# Patient Record
Sex: FEMALE | Race: WHITE | Employment: UNEMPLOYED | URBAN - METROPOLITAN AREA
[De-identification: names, ages, dates, MRNs, and addresses within clinical notes are randomized per-mention and may not be internally consistent; named-entity substitution may affect disease eponyms.]

---

## 2017-10-17 ENCOUNTER — OFFICE VISIT (OUTPATIENT)
Dept: FAMILY MEDICINE CLINIC | Age: 32
End: 2017-10-17
Payer: COMMERCIAL

## 2017-10-17 DIAGNOSIS — Z83.2 FAMILY HISTORY OF FACTOR V DEFICIENCY: ICD-10-CM

## 2017-10-17 DIAGNOSIS — F41.9 ANXIETY: ICD-10-CM

## 2017-10-17 DIAGNOSIS — M51.36 DDD (DEGENERATIVE DISC DISEASE), LUMBAR: ICD-10-CM

## 2017-10-17 DIAGNOSIS — R63.5 WEIGHT GAIN: ICD-10-CM

## 2017-10-17 DIAGNOSIS — Z00.00 PREVENTATIVE HEALTH CARE: ICD-10-CM

## 2017-10-17 DIAGNOSIS — Z01.419 ENCOUNTER FOR CERVICAL PAP SMEAR WITH PELVIC EXAM: ICD-10-CM

## 2017-10-17 DIAGNOSIS — M79.7 FIBROMYALGIA: ICD-10-CM

## 2017-10-17 DIAGNOSIS — N95.1 HOT FLUSHES, PERIMENOPAUSAL: Primary | ICD-10-CM

## 2017-10-17 PROCEDURE — 99204 OFFICE O/P NEW MOD 45 MIN: CPT | Performed by: NURSE PRACTITIONER

## 2017-10-17 RX ORDER — GABAPENTIN 100 MG/1
100 CAPSULE ORAL 2 TIMES DAILY
Qty: 60 CAPSULE | Refills: 0 | Status: SHIPPED | OUTPATIENT
Start: 2017-10-17 | End: 2017-10-31

## 2017-10-17 ASSESSMENT — ENCOUNTER SYMPTOMS
BACK PAIN: 1
WHEEZING: 0
BLOOD IN STOOL: 0
RHINORRHEA: 0
ABDOMINAL PAIN: 0
SINUS PRESSURE: 0
EYE DISCHARGE: 0
COUGH: 0
SHORTNESS OF BREATH: 0

## 2017-10-17 NOTE — PROGRESS NOTES
[id number] [place] [id number]  Dept: [phone]  Dept Fax: [phone]    Sherice Shirley is a 27 y.o. female who presents today for her medical conditions/complaints as noted below. Sherice Shirley is c/o of Establish Care ( body feels hot not sweating) and Weight Loss (gained weight)        HPI:     Patient presents with:  Establish Care:  body feels hot  While sleeping, not sweating x 8 mos   Weight Loss: gained weight, over 1.5 y; 40-50 lbs. Stressful yr - brother   Brother +  factor V , would like tested   DDD dx at young age. Has been in PM in past/injections   Fibromyal; has been on lyrica in past. Caused tiredness. Took neurontin   Anxiety; took ativan .quit f/u   'had bad experience with treatments' just quit going. Wants fresh experience. Sees ob/gyn in [facility]. Past Medical History:   Diagnosis Date    Anxiety     Chronic back pain     DDD (degenerative disc disease), lumbar     L4-5 ; history of injections     Depression     Fibromyalgia     Headache       History reviewed. No pertinent surgical history. Family History   Problem Relation Age of Onset    High Blood Pressure Mother     Other Mother     Breast Cancer Maternal Grandmother     Depression Paternal Grandmother     Stroke Brother      age 29    Other Brother      Leiden factor V        Social History   Substance Use Topics    Smoking status: Former Smoker     Packs/day: 0.50     Years: 15.00    Smokeless tobacco: Never Used      Comment: quit 2015    Alcohol use No      Current Outpatient Prescriptions   Medication Sig Dispense Refill    gabapentin (NEURONTIN) 100 MG capsule Take 1 capsule by mouth 2 times daily 60 capsule 0     No current facility-administered medications for this visit. No Known Allergies      Subjective:      Review of Systems   Constitutional: Negative for activity change, chills, diaphoresis, fatigue and fever.    HENT: to person, place, and time. Skin: Skin is warm and dry. Psychiatric: She has a normal mood and affect. Nursing note and vitals reviewed. /82   Pulse 83   Temp 98.1 °F (36.7 °C) (Oral)   Ht 4' 11\" (1.499 m)   Wt 255 lb 14.4 oz (116.1 kg)   LMP 09/24/2017 (Approximate)   SpO2 99%   Breastfeeding? No   BMI 51.69 kg/m²     CBC: No results found for: WBC, RBC, HGB, HCT, MCV, MCH, MCHC, RDW, PLT, MPV  CMP:  No results found for: NA, K, CL, CO2, BUN, CREATININE, GFRAA, AGRATIO, LABGLOM, GLUCOSE, PROT, LABALBU, CALCIUM, BILITOT, ALKPHOS, AST, ALT  No results found for: LABA1C        Assessment:      1. Hot flushes, perimenopausal     2. Fibromyalgia  gabapentin (NEURONTIN) 100 MG capsule    Valerie Strickland MD, [facility] [place]*   3. Anxiety  Amb External Referral To Psychology   4. Weight gain     5. DDD (degenerative disc disease), lumbar  Gladys Gonzalez MD, [facility] [place]*   6. Preventative health care  Comprehensive Metabolic Panel    CBC    Lipid Panel    TSH with Reflex    Vitamin D 25 Hydroxy   7. Encounter for cervical Pap smear with pelvic exam  [id number] [facility], [address] [place] [address]   8. Family history of factor V deficiency  Prothrombin 20210 Mutation Invader    Factor V Leiden       Plan: Will monitor. Discussed ssri use and decrasesing heat wheils sleeping, incl not limited to fan,clothing, bedsheets, etc  2. Will start gabap. Monitor for improvement. 3 ref placed - no meds at his time  4 Labs ordered   5 ref placed. no meds at his time  [place] received counseling on the following healthy behaviors: medication adherence  Reviewed prior labs and health maintenance  Continue current medications, diet and exercise. Discussed use, benefit, and side effects of prescribed medications. Barriers to medication compliance addressed.    Patient given educational materials - see patient instructions  Was a self-tracking handout given in paper form or via MyChart? Yes    Requested Prescriptions     Signed Prescriptions Disp Refills    gabapentin (NEURONTIN) 100 MG capsule 60 capsule 0     Sig: Take 1 capsule by mouth 2 times daily       All patient questions answered. Patient voiced understanding. Quality Measures    Body mass index is 51.69 kg/m². Elevated. Weight control planned discussed Healthy diet and regular exercise. BP: 124/82 Blood pressure is normal. Treatment plan consists of No treatment change needed. No results found for: LDLCALC, LDLCHOLESTEROL, LDLDIRECT (goal LDL reduction with dx if diabetes is 50% LDL reduction)      No flowsheet data found. Interpretation of Total Score Depression Severity: 1-4 = Minimal depression, 5-9 = Mild depression, 10-14 = Moderate depression, 15-19 = Moderately severe depression, 20-27 = Severe depression    Return in about 3 weeks (around 11/7/2017).     Orders Placed This Encounter   Procedures    Prothrombin 20210 Mutation Invader     Standing Status:   Future     Standing Expiration Date:   10/17/2018    Factor V Leiden     Standing Status:   Future     Standing Expiration Date:   10/17/2018    Comprehensive Metabolic Panel     Standing Status:   Future     Standing Expiration Date:   10/17/2018    CBC     Standing Status:   Future     Standing Expiration Date:   10/17/2018    Lipid Panel     Standing Status:   Future     Standing Expiration Date:   10/17/2018     Order Specific Question:   Is Patient Fasting?/# of Hours     Answer:   12    TSH with Reflex     Standing Status:   Future     Standing Expiration Date:   10/18/2018    Vitamin D 25 Hydroxy     Standing Status:   Future     Standing Expiration Date:   10/17/2018   [address], [address], [address]*     Referral Priority:   Routine     Referral Type:   Consult for Advice and Opinion     Referral Reason:   Specialty Services Required     Requested Specialty:   Obstetrics & Gynecology     Number of Visits Requested:   1    Amb External Referral To Psychology     Referral Priority:   Routine     Referral Reason:   Specialty Services Required     Referred to Provider:   Hermann Regalado     Requested Specialty:   Psychology     Number of Visits Requested:   [facility] Sandra Iverson MD, [facility] [place]*     Referral Priority:   Routine     Referral Type:   Consult for Advice and Opinion     Referral Reason:   Specialty Services Required     Referred to Provider:   Maria Del Rosario Suarez MD     Requested Specialty:   Pain Management     Number of Visits Requested:   1     Orders Placed This Encounter   Medications    gabapentin (NEURONTIN) 100 MG capsule     Sig: Take 1 capsule by mouth 2 times daily     Dispense:  60 capsule     Refill:  0       Patient given verbal and/or written educational instructions. Follow up as directed. I have reviewed and agree with the nursing documentation.     Electronically signed by Pablo Steele NP on 10/17/2017 at 11:49 AM

## 2017-10-18 ENCOUNTER — TELEPHONE (OUTPATIENT)
Dept: FAMILY MEDICINE CLINIC | Age: 32
End: 2017-10-18

## 2017-10-18 VITALS
HEART RATE: 83 BPM | DIASTOLIC BLOOD PRESSURE: 82 MMHG | TEMPERATURE: 98.1 F | BODY MASS INDEX: 41.33 KG/M2 | OXYGEN SATURATION: 99 % | SYSTOLIC BLOOD PRESSURE: 124 MMHG | HEIGHT: 59 IN | WEIGHT: 205 LBS

## 2017-10-18 DIAGNOSIS — Z72.51 UNPROTECTED SEX: Primary | ICD-10-CM

## 2017-10-18 NOTE — TELEPHONE ENCOUNTER
Pt is requesting pregnancy test to be added to her blood work order that you ordered. Can you do this. Please advise.  Thank you

## 2017-10-26 ENCOUNTER — HOSPITAL ENCOUNTER (OUTPATIENT)
Age: 32
Setting detail: SPECIMEN
Discharge: HOME | End: 2017-10-26
Payer: COMMERCIAL

## 2017-10-26 DIAGNOSIS — Z00.00 PREVENTATIVE HEALTH CARE: ICD-10-CM

## 2017-10-26 DIAGNOSIS — Z83.2 FAMILY HISTORY OF FACTOR V DEFICIENCY: ICD-10-CM

## 2017-10-26 DIAGNOSIS — Z72.51 UNPROTECTED SEX: ICD-10-CM

## 2017-10-26 LAB
ALBUMIN SERPL-MCNC: 4 G/DL (ref 3.5–5.2)
ALBUMIN/GLOBULIN RATIO: 1.2 (ref 1–2.5)
ALP BLD-CCNC: 92 U/L (ref 35–104)
ALT SERPL-CCNC: 22 U/L (ref 5–33)
ANION GAP SERPL CALCULATED.3IONS-SCNC: 17 MMOL/L (ref 9–17)
AST SERPL-CCNC: 21 U/L
BILIRUB SERPL-MCNC: 0.26 MG/DL (ref 0.3–1.2)
BUN BLDV-MCNC: 9 MG/DL (ref 6–20)
BUN/CREAT BLD: ABNORMAL (ref 9–20)
CALCIUM SERPL-MCNC: 9.3 MG/DL (ref 8.6–10.4)
CHLORIDE BLD-SCNC: 101 MMOL/L (ref 98–107)
CHOLESTEROL/HDL RATIO: 2
CHOLESTEROL: 108 MG/DL
CO2: 20 MMOL/L (ref 20–31)
CREAT SERPL-MCNC: 0.56 MG/DL (ref 0.5–0.9)
GFR AFRICAN AMERICAN: >60 ML/MIN
GFR NON-AFRICAN AMERICAN: >60 ML/MIN
GFR SERPL CREATININE-BSD FRML MDRD: ABNORMAL ML/MIN/{1.73_M2}
GFR SERPL CREATININE-BSD FRML MDRD: ABNORMAL ML/MIN/{1.73_M2}
GLUCOSE BLD-MCNC: 84 MG/DL (ref 70–99)
HCG QUANTITATIVE: 1764 IU/L
HCT VFR BLD CALC: 41.9 % (ref 36–46)
HDLC SERPL-MCNC: 54 MG/DL
HEMOGLOBIN: 14.1 G/DL (ref 12–16)
LDL CHOLESTEROL: 46 MG/DL (ref 0–130)
MCH RBC QN AUTO: 29.4 PG (ref 26–34)
MCHC RBC AUTO-ENTMCNC: 33.7 G/DL (ref 31–37)
MCV RBC AUTO: 87.2 FL (ref 80–100)
PDW BLD-RTO: 14.6 % (ref 12.5–15.4)
PLATELET # BLD: 387 K/UL (ref 140–450)
PMV BLD AUTO: 8.1 FL (ref 6–12)
POTASSIUM SERPL-SCNC: 3.7 MMOL/L (ref 3.7–5.3)
RBC # BLD: 4.8 M/UL (ref 4–5.2)
SODIUM BLD-SCNC: 138 MMOL/L (ref 135–144)
TOTAL PROTEIN: 7.4 G/DL (ref 6.4–8.3)
TRIGL SERPL-MCNC: 42 MG/DL
TSH SERPL DL<=0.05 MIU/L-ACNC: 2.62 MIU/L (ref 0.3–5)
VITAMIN D 25-HYDROXY: 13.8 NG/ML (ref 30–100)
VLDLC SERPL CALC-MCNC: NORMAL MG/DL (ref 1–30)
WBC # BLD: 12.9 K/UL (ref 3.5–11)

## 2017-10-31 ENCOUNTER — OFFICE VISIT (OUTPATIENT)
Dept: FAMILY MEDICINE CLINIC | Age: 32
End: 2017-10-31
Payer: COMMERCIAL

## 2017-10-31 VITALS
TEMPERATURE: 98 F | HEIGHT: 59 IN | WEIGHT: 209.9 LBS | OXYGEN SATURATION: 98 % | HEART RATE: 89 BPM | SYSTOLIC BLOOD PRESSURE: 122 MMHG | DIASTOLIC BLOOD PRESSURE: 80 MMHG | BODY MASS INDEX: 42.32 KG/M2

## 2017-10-31 DIAGNOSIS — M51.36 DDD (DEGENERATIVE DISC DISEASE), LUMBAR: Primary | ICD-10-CM

## 2017-10-31 DIAGNOSIS — M79.7 FIBROMYALGIA: ICD-10-CM

## 2017-10-31 DIAGNOSIS — F41.9 ANXIETY: ICD-10-CM

## 2017-10-31 DIAGNOSIS — E55.9 VITAMIN D DEFICIENCY: ICD-10-CM

## 2017-10-31 PROCEDURE — 99214 OFFICE O/P EST MOD 30 MIN: CPT | Performed by: NURSE PRACTITIONER

## 2017-10-31 ASSESSMENT — PATIENT HEALTH QUESTIONNAIRE - PHQ9
1. LITTLE INTEREST OR PLEASURE IN DOING THINGS: 0
SUM OF ALL RESPONSES TO PHQ9 QUESTIONS 1 & 2: 0
SUM OF ALL RESPONSES TO PHQ QUESTIONS 1-9: 0
2. FEELING DOWN, DEPRESSED OR HOPELESS: 0

## 2017-10-31 ASSESSMENT — ENCOUNTER SYMPTOMS
WHEEZING: 0
BLOOD IN STOOL: 0
ABDOMINAL PAIN: 0
BACK PAIN: 1
SHORTNESS OF BREATH: 0
EYE DISCHARGE: 0
NAUSEA: 0
COUGH: 0
RHINORRHEA: 0
SINUS PRESSURE: 0

## 2017-10-31 NOTE — PROGRESS NOTES
cold intolerance and heat intolerance. Genitourinary: Negative for dysuria, flank pain and hematuria. Musculoskeletal: Positive for arthralgias and back pain. Negative for joint swelling and myalgias. Skin: Negative for pallor and rash. Neurological: Negative for headaches. Psychiatric/Behavioral: Negative for hallucinations and suicidal ideas. The patient is nervous/anxious (most concerns are now  re: new pregnancy ). Objective:     Physical Exam   Constitutional: She is oriented to person, place, and time. She appears well-developed and well-nourished. HENT:   Head: Normocephalic and atraumatic. Eyes: EOM are normal.   Neck: Carotid bruit is not present. Cardiovascular: Normal rate, regular rhythm and normal heart sounds. Pulmonary/Chest: Effort normal and breath sounds normal.   Abdominal: Soft. Bowel sounds are normal.   Neurological: She is alert and oriented to person, place, and time. Skin: Skin is warm and dry. Psychiatric: She has a normal mood and affect. Nursing note and vitals reviewed.     /80   Pulse 89   Temp 98 °F (36.7 °C) (Oral)   Ht 4' 11\" (1.499 m)   Wt 209 lb 14.4 oz (95.2 kg)   LMP 09/24/2017 (Approximate)   SpO2 98%   BMI 42.39 kg/m²     CBC:   Lab Results   Component Value Date    WBC 12.9 10/26/2017    RBC 4.80 10/26/2017    HGB 14.1 10/26/2017    HCT 41.9 10/26/2017    MCV 87.2 10/26/2017    MCH 29.4 10/26/2017    MCHC 33.7 10/26/2017    RDW 14.6 10/26/2017     10/26/2017    MPV 8.1 10/26/2017     CMP:    Lab Results   Component Value Date     10/26/2017    K 3.7 10/26/2017     10/26/2017    CO2 20 10/26/2017    BUN 9 10/26/2017    CREATININE 0.56 10/26/2017    GFRAA >60 10/26/2017    LABGLOM >60 10/26/2017    GLUCOSE 84 10/26/2017    PROT 7.4 10/26/2017    LABALBU 4.0 10/26/2017    CALCIUM 9.3 10/26/2017    BILITOT 0.26 10/26/2017    ALKPHOS 92 10/26/2017    AST 21 10/26/2017    ALT 22 10/26/2017     No results found for: LABA1C        Assessment:      1. DDD (degenerative disc disease), lumbar     2. Fibromyalgia     3. Vitamin D deficiency     4. Anxiety         Plan:     1. DDD (degenerative disc disease), lumbar     2. Fibromyalgia   pt advised to stop gabapentin. Will discuss with future ob provider. First appt 17. 3. Vitamin D deficiency Pt will be started on pre jesus vit, will wait on vit d supplementation. 4. Anxiety  Has ref to penny mendoza soon. hector re: moving, brother, now pregnance                   [employer] received counseling on the following healthy behaviors: medication adherence  Reviewed prior labs and health maintenance  Continue current medications, diet and exercise. Discussed use, benefit, and side effects of prescribed medications. Barriers to medication compliance addressed. Patient given educational materials - see patient instructions  Was a self-tracking handout given in paper form or via [email or web address]? Yes    Requested Prescriptions      No prescriptions requested or ordered in this encounter       All patient questions answered. Patient voiced understanding. Quality Measures    Body mass index is 42.39 kg/m². Elevated. Weight control planned discussed Healthy diet and regular exercise. BP: 122/80 Blood pressure is normal. Treatment plan consists of No treatment change needed. Lab Results   Component Value Date    LDLCHOLESTEROL 46 10/26/2017    (goal LDL reduction with dx if diabetes is 50% LDL reduction)      PHQ Scores 10/31/2017   PHQ2 Score 0   PHQ9 Score 0     Interpretation of Total Score Depression Severity: 1-4 = Minimal depression, 5-9 = Mild depression, 10-14 = Moderate depression, 15-19 = Moderately severe depression, 20-27 = Severe depression    Return in about 3 months (around 2018). No orders of the defined types were placed in this encounter. No orders of the defined types were placed in this encounter.       Patient given verbal and/or written

## 2017-11-01 LAB
FACTOR V LEIDEN MUTATION: NORMAL
PROTHROMBIN G20210A MUTATION: NORMAL

## 2017-11-08 ENCOUNTER — TELEPHONE (OUTPATIENT)
Dept: OBGYN CLINIC | Age: 32
End: 2017-11-08

## 2017-11-08 ENCOUNTER — TELEPHONE (OUTPATIENT)
Dept: FAMILY MEDICINE CLINIC | Age: 32
End: 2017-11-08

## 2017-11-08 NOTE — TELEPHONE ENCOUNTER
Pt stated that she is having but patient has not seen her gyn yet.  She doesn't see her until next week that is why she is calling

## 2017-11-09 NOTE — TELEPHONE ENCOUNTER
I cannot recommend anti diarrheal b/c it will constipate her more, cannot rx motility drug could instigate diarrhea. She should make appt to discuss the time frame of these complaints. She needs excellent h20 intake at this time. Please update patient.

## 2017-11-14 ENCOUNTER — HOSPITAL ENCOUNTER (OUTPATIENT)
Age: 32
Setting detail: SPECIMEN
Discharge: HOME | End: 2017-11-14
Payer: COMMERCIAL

## 2017-11-14 ENCOUNTER — OFFICE VISIT (OUTPATIENT)
Dept: OBGYN CLINIC | Age: 32
End: 2017-11-14
Payer: COMMERCIAL

## 2017-11-14 VITALS
HEIGHT: 59 IN | SYSTOLIC BLOOD PRESSURE: 92 MMHG | WEIGHT: 206.4 LBS | BODY MASS INDEX: 41.61 KG/M2 | DIASTOLIC BLOOD PRESSURE: 70 MMHG

## 2017-11-14 DIAGNOSIS — Z32.01 POSITIVE PREGNANCY TEST: ICD-10-CM

## 2017-11-14 DIAGNOSIS — K59.00 CONSTIPATION, UNSPECIFIED CONSTIPATION TYPE: ICD-10-CM

## 2017-11-14 DIAGNOSIS — Z32.01 POSITIVE PREGNANCY TEST: Primary | ICD-10-CM

## 2017-11-14 LAB
CHLAMYDIA CULTURE: NEGATIVE
CULTURE, GONORRHOEAE: NEGATIVE

## 2017-11-14 PROCEDURE — 99213 OFFICE O/P EST LOW 20 MIN: CPT | Performed by: NURSE PRACTITIONER

## 2017-11-14 NOTE — PATIENT INSTRUCTIONS
Patient Education   Patient Education        Learning About When to Call Your Doctor During Pregnancy (Up to 20 Weeks)  Your Care Instructions  It's common to have concerns about what might be a problem during pregnancy. Although most pregnant women don't have any serious problems, it's important to know when to call your doctor if you have certain symptoms. These are general suggestions. Your doctor may give you some more information about when to call. When to call your doctor (up to 20 weeks)  Call 911 anytime you think you may need emergency care. For example, call if:  · You passed out (lost consciousness). Call your doctor now or seek immediate medical care if:  · You have a fever. · You have vaginal bleeding. · You are dizzy or lightheaded, or you feel like you may faint. · You have symptoms of a urinary tract infection. These may include:  ¨ Pain or burning when you urinate. ¨ A frequent need to urinate without being able to pass much urine. ¨ Pain in the flank, which is just below the rib cage and above the waist on either side of the back. ¨ Blood in your urine. · You have belly pain. · You think you are having contractions. · You have a sudden release of fluid from your vagina. Watch closely for changes in your health, and be sure to contact your doctor if:  · You have vaginal discharge that smells bad. · You have other concerns about your pregnancy. Follow-up care is a key part of your treatment and safety. Be sure to make and go to all appointments, and call your doctor if you are having problems. It's also a good idea to know your test results and keep a list of the medicines you take. Where can you learn more? Go to https://arline.Showbucks. org and sign in to your LoveSurf account. Enter L542 in the Juxinli box to learn more about \"Learning About When to Call Your Doctor During Pregnancy (Up to 20 Weeks). \"     If you do not have an account, please click on the \"Sign Up Now\" link. Current as of: March 16, 2017  Content Version: 11.3  © 2134-9580 [employer]. Care instructions adapted under license by [facility] ([facility]). If you have questions about a medical condition or this instruction, always ask your healthcare professional. [address] any warranty or liability for your use of this information. Weeks 6 to 10 of Your Pregnancy: Care Instructions  Your Care Instructions    Congratulations on your pregnancy. This is an exciting and important time for you. During the first 6 to 10 weeks of your pregnancy, your body goes through many changes. Your baby grows very fast, even though you cannot feel it yet. You may start to notice that you feel different, both in your body and your emotions. Because each woman's pregnancy is unique, there is no right way to feel. You may feel the healthiest you have ever been, or you may feel tired or sick to your stomach (\"morning sickness\"). These early weeks are a time to make healthy choices and to eat the best foods for you and your baby. This care sheet will give you some ideas. This is also a good time to think about birth defects testing. These are tests done during pregnancy to look for possible problems with the baby. First trimester tests for birth defects can be done between 8 and 17 weeks of pregnancy, depending on the test. Talk with your doctor about what kinds of tests are available. Follow-up care is a key part of your treatment and safety. Be sure to make and go to all appointments, and call your doctor if you are having problems. It's also a good idea to know your test results and keep a list of the medicines you take. How can you care for yourself at home? Eat well  · Eat at least 3 meals and 2 healthy snacks every day.  Eat fresh, whole foods, including:  ¨ 7 or more servings of bread, tortillas, cereal, rice, pasta, or oatmeal.  ¨ 3 or more servings of vegetables, especially leafy green vegetables. ¨ 2 or more servings of fruits. ¨ 3 or more servings of milk, yogurt, or cheese. ¨ 2 or more servings of meat, turkey, chicken, fish, eggs, or dried beans. · Drink plenty of fluids, especially water. Avoid sodas and other sweetened drinks. · Choose foods that have important vitamins for your baby, such as calcium, iron, and folate. ¨ Dairy products, tofu, canned fish with bones, almonds, broccoli, dark leafy greens, corn tortillas, and fortified orange juice are good sources of calcium. ¨ Beef, poultry, liver, spinach, lentils, dried beans, fortified cereals, and dried fruits are rich in iron. ¨ Dark leafy greens, broccoli, asparagus, liver, fortified cereals, orange juice, peanuts, and almonds are good sources of folate. · Avoid foods that could harm your baby. ¨ Do not eat raw or undercooked meat, chicken, or fish (such as sushi or raw oysters). ¨ Do not eat raw eggs or foods that contain raw eggs, such as Caesar dressing. ¨ Do not eat soft cheeses and unpasteurized dairy foods, such as Brie, feta, or blue cheese. ¨ Do not eat fish that contains a lot of mercury, such as shark, swordfish, tilefish, or gregory mackerel. Do not eat more than 6 ounces of tuna each week. ¨ Do not eat raw sprouts, especially alfalfa sprouts. ¨ Cut down on caffeine, such as coffee, tea, and cola. Protect yourself and your baby  · Do not touch chau litter or cat feces. They can cause an infection that could harm your baby. · High body temperature can be harmful to your baby. So if you want to use a sauna or hot tub, be sure to talk to your doctor about how to use it safely. [place] with morning sickness  · Sip small amounts of water, juices, or shakes. Try drinking between meals, not with meals. · Eat 5 or 6 small meals a day. Try dry toast or crackers when you first get up, and eat breakfast a little later. · Avoid spicy, greasy, and fatty foods.   · When you feel sick, open your windows or go for a short walk to get fresh air. · Try nausea wristbands. These help some women. · Tell your doctor if you think your prenatal vitamins make you sick. Where can you learn more? Go to https://chmichelle.healthScrewpulp. org and sign in to your ValveXchange account. Enter G112 in the [facility] box to learn more about \"Weeks 6 to 10 of Your Pregnancy: Care Instructions. \"     If you do not have an account, please click on the \"Sign Up Now\" link. Current as of: March 16, 2017  Content Version: 11.3  © 6488-5167 [email or web address]. Care instructions adapted under license by [facility] ([facility]). If you have questions about a medical condition or this instruction, always ask your healthcare professional. [address] any warranty or liability for your use of this information. Patient Education        High-Fiber Diet: Care Instructions  Your Care Instructions  A high-fiber diet may help you relieve constipation and feel less bloated. Your doctor and dietitian will help you make a high-fiber eating plan based on your personal needs. The plan will include the things you like to eat. It will also make sure that you get 30 grams of fiber a day. Before you make changes to the way you eat, be sure to talk with your doctor or dietitian. Follow-up care is a key part of your treatment and safety. Be sure to make and go to all appointments, and call your doctor if you are having problems. It's also a good idea to know your test results and keep a list of the medicines you take. How can you care for yourself at home? · You can increase how much fiber you get if you eat more of certain foods. These foods include:  ¨ Whole-grain breads and cereals. ¨ Fruits, such as pears, apples, and peaches. Eat the skins and peels if you can. ¨ Vegetables, such as broccoli, cabbage, spinach, carrots, asparagus, and squash. ¨ Starchy vegetables.  These include potatoes with skins, kidney beans, and lima beans. · Take a fiber supplement every day if your doctor recommends it. Examples are Benefiber, Citrucel, FiberCon, and Metamucil. Ask your doctor how much to take. · Drink plenty of fluids, enough so that your urine is light yellow or clear like water. If you have kidney, heart, or liver disease and have to limit fluids, talk with your doctor before you increase the amount of fluids you drink. · Get some exercise every day. Exercise helps stool move through the colon. It also helps prevent constipation. · Keep a food diary. Try to notice and write down what foods cause gas, pain, or other symptoms. Then you can avoid these foods. Where can you learn more? Go to https://NvestpeBlottreb.Impact Radius. org and sign in to your delicious account. Enter Q271 in the [employer] box to learn more about \"High-Fiber Diet: Care Instructions. \"     If you do not have an account, please click on the \"Sign Up Now\" link. Current as of: December 15, 2016  Content Version: 11.3  © 8572-3639 [employer]. Care instructions adapted under license by [facility] ([facility]). If you have questions about a medical condition or this instruction, always ask your healthcare professional. [address] any warranty or liability for your use of this information.

## 2017-11-14 NOTE — PROGRESS NOTES
Valeria Baker is a 27 y.o.  at 7w6d with Estimated Date of Delivery: 18 who presents for prenatal care. This is a planned pregnancy : Yes  Patient's last menstrual period was 2017. Certain LMP : yes      Pregnancy symptoms include fatigue, breast tenderness, nausea, \"morning sickness\", positive home pregnancy test and frequent urination  nausea with vomiting for 10 days  Self employed  Pain Score   0/10  Partner's name- Napolean  Relationship with FOB: spouse, living together. Patient does intend to breast feed. Pregnancy history fully reviewed. Mother's ethnicity:   Father's ethnicity:     Has been having constipation issues for about 2 weeks. POB:   OB History    Para Term  AB Living   2 1 1     1   SAB TAB Ectopic Molar Multiple Live Births             1      # Outcome Date GA Lbr Gallito/2nd Weight Sex Delivery Anes PTL Lv   2 Current            1 Term  40w0d   F Vag-Spont   MORRO        Complications from previous pregnancies/deliveries  Denies complications    PGYN: denies STDs; denies abnormal pap smears                      Menses regular no  Contraception none    PMH:  has a past medical history of Anxiety; Chronic back pain; DDD (degenerative disc disease), lumbar; Depression; Fibromyalgia; and Headache. PSH:  has no past surgical history on file. FH:family history includes Breast Cancer in her maternal grandmother; Depression in her paternal grandmother; High Blood Pressure in her mother; Other in her brother and mother; Stroke in her brother. SH: denies X 3, family supportive  reports that she has quit smoking. She has a 7.50 pack-year smoking history. She has never used smokeless tobacco. She reports that she does not drink alcohol or use drugs.   ROS      Physical exam:BP 92/70 (Site: Right Arm, Position: Sitting, Cuff Size: Large Adult)   Ht 4' 11\" (1.499 m)   Wt 206 lb 6.4 oz (93.6 kg)   LMP 2017 defined types were placed in this encounter.

## 2017-11-15 LAB
C TRACH DNA GENITAL QL NAA+PROBE: NEGATIVE
N. GONORRHOEAE DNA: NEGATIVE

## 2017-11-27 LAB
CYTOLOGY REPORT: NORMAL
HPV SAMPLE: NORMAL
HPV SOURCE: NORMAL
HPV, GENOTYPE 16: NOT DETECTED
HPV, GENOTYPE 18: NOT DETECTED
HPV, HIGH RISK OTHER: NOT DETECTED
HPV, INTERPRETATION: NORMAL

## 2017-12-01 ENCOUNTER — INITIAL PRENATAL (OUTPATIENT)
Dept: OBGYN CLINIC | Age: 32
End: 2017-12-01

## 2017-12-01 ENCOUNTER — PROCEDURE VISIT (OUTPATIENT)
Dept: OBGYN CLINIC | Age: 32
End: 2017-12-01

## 2017-12-01 ENCOUNTER — HOSPITAL ENCOUNTER (OUTPATIENT)
Age: 32
Setting detail: SPECIMEN
Discharge: HOME | End: 2017-12-01
Payer: COMMERCIAL

## 2017-12-01 VITALS
DIASTOLIC BLOOD PRESSURE: 74 MMHG | BODY MASS INDEX: 42.37 KG/M2 | SYSTOLIC BLOOD PRESSURE: 98 MMHG | HEART RATE: 82 BPM | WEIGHT: 209.8 LBS

## 2017-12-01 DIAGNOSIS — Z3A.10 10 WEEKS GESTATION OF PREGNANCY: Primary | ICD-10-CM

## 2017-12-01 DIAGNOSIS — Z32.01 POSITIVE PREGNANCY TEST: ICD-10-CM

## 2017-12-01 LAB
AMPHETAMINE SCREEN URINE: NEGATIVE
BARBITURATE SCREEN URINE: NEGATIVE
BENZODIAZEPINE SCREEN, URINE: NEGATIVE
BUPRENORPHINE URINE: NORMAL
CANNABINOID SCREEN URINE: NEGATIVE
COCAINE METABOLITE, URINE: NEGATIVE
CRL: NORMAL CM
MDMA URINE: NORMAL
METHADONE SCREEN, URINE: NEGATIVE
METHAMPHETAMINE, URINE: NORMAL
OPIATES, URINE: NEGATIVE
OXYCODONE SCREEN URINE: NEGATIVE
PHENCYCLIDINE, URINE: NEGATIVE
PROPOXYPHENE, URINE: NORMAL
SAC DIAMETER: NORMAL CM
TEST INFORMATION: NORMAL
TRICYCLIC ANTIDEPRESSANTS, UR: NORMAL

## 2017-12-01 PROCEDURE — 0500F INITIAL PRENATAL CARE VISIT: CPT | Performed by: NURSE PRACTITIONER

## 2017-12-01 NOTE — PATIENT INSTRUCTIONS
thickness of the area at the back of the baby's neck. An increase in the thickness can be an early sign of Down syndrome. ¨ Chorionic villus sampling (CVS). This is a test that looks for certain genetic problems with your baby. The same genes that are in your baby are in the placenta. A small piece of the placenta is taken out and tested. This test is done when you are 10 to 13 weeks pregnant. Ease discomfort  · Slow down and take naps when you feel tired. · If your emotions swing, talk to someone. Crying, anxiety, and concentration problems are common. · If your gums bleed, try a softer toothbrush. If your gums are puffy and bleed a lot, see your dentist.  · If you feel dizzy:  ¨ Get up slowly after sitting or lying down. ¨ Drink plenty of fluids. ¨ Eat small snacks to keep your blood sugar stable. ¨ Put your head between your legs as though you were tying your shoelaces. ¨ Lie down with your legs higher than your head. Use pillows to prop up your feet. · If you have a headache:  ¨ Lie down. ¨ Ask your partner or a good friend for a neck massage. ¨ Try cool cloths over your forehead or across the back of your neck. ¨ Use acetaminophen (Tylenol) for pain relief. Do not use nonsteroidal anti-inflammatory drugs (NSAIDs), such as ibuprofen (Advil, Motrin) or naproxen (Aleve), unless your doctor says it is okay. · If you have a nosebleed, pinch your nose gently, and hold it for a short while. To prevent nosebleeds, try massaging a small dab of petroleum jelly, such as Vaseline, in your nostrils. · If your nose is stuffed up, try saline (saltwater) nose sprays. Do not use decongestant sprays. Care for your breasts  · Wear a bra that gives you good support. · Know that changes in your breasts are normal.  ¨ Your breasts may get larger and more tender. Tenderness usually gets better by 12 weeks. ¨ Your nipples may get darker and larger, and small bumps around your nipples may show more.   ¨ The veins in

## 2017-12-01 NOTE — PROGRESS NOTES
for detecting gene-specific sequences. Target amplification is followed by the  signal generation (Invader)  phase of the assay. The Invader assay employs a genetically  engineered nuclease, known as a Cleavasee enzyme, to recognize and  cleave specific genomic structures formed by the addition of two  oligonucleotide probes [Wild Type (WT) or Mutant (Mut)] to a nucleic  acid target. Fluorescence signal intensity is enhanced through a  second Cleavasee cleavage reaction and fluorescence resonance energy  transfer (FRET), with detection of fluorescent signal using a  fluorescence plate reader. Invader and Cleavase are registered  [facility].  This test is performed pursuant to an  agreement with Jesscia Coombs.          **Electronically Signed Out**  Rufus Holm M.D. Prothrombin 96176 Mutation Invader    Collection Time: 10/26/17 12:00 AM   Result Value Ref Range    Prothrombin Mutation       (NOTE)  [id number]  [facility]  MOLECULAR PATHOLOGY LABORATORY  [address], [address] [place], [address]  Tel [phone] ([address]) [facility] for Alessandro Julien MD,   Ramy Burch MD  Specimen(s) Received:  Peripheral blood, PTI  Clinical Information:  Family History of Factor V Deficiency    RESULTS:  MOLECULAR GENETIC DIAGNOSIS:          Negative for Prothrombin 01228R  Mutation    INTERPRETATION:  The Factor II (Prothrombin) mutation (07105MD)  [c.*97G>A] was not detected in this study. This patient may, however,  still be at risk for venous thrombosis due to another genetic  predisposition including the Factor V Leiden (1691GA) mutation or  either of the 5, 10-methylenetetrahydrofolate reductase (MTHFR)  mutations (677T and E3827W). Additional molecular testing is  available for these mutations in this laboratory.   In addition, other  etiologies not studied in this assay may pre dispose this patient to  venous thrombosis. Patients and their families undergoing molecular diagnostic testing  should understand that a normal result for such tests  does not  preclude the unlikely possibility of genotyping errors occurring as a  result of rare genetic variants which can interfere with the analysis  or an unusual mutation of the gene(s) being studied. This molecular test has been approved for in vitro diagnostic use by  the U.S. FDA. This test is used for clinical purposes. Pursuant to  the requirements of CLIA '88, this laboratory has established and  verified the test's accuracy and precision. It should not be regarded  as investigational or for research. This laboratory is certified  under the [address] (CLIA  '88) as qualified to perform high complexity clinical laboratory  testing. METHODOLOGY:  Prothrombin is a precursor of the enzyme serine protease  thrombin that has procoagulant, anticoagulant, and antif ibrinolytic  activities. A single point mutation has been identified in exon 14 at  position 72593 of the prothrombin gene (78394TZ). This allele has a  population frequency of 1.2% and is associated with a 2.8 fold  increased risk of venous thrombosis in individuals of [place]   ancestry. Patient DNA is assayed for the presence of wild type or mutant gene  sequences in the Factor II (Prothrombin) gene by the Invader Factor II  test that utilizes Realie chemistry for detecting gene-specific  sequences. Target amplification is followed by the signal generation  (Invader) phase of the assay. The Invader assay employs a genetically  engineered nuclease, known as a Cleavasee enzyme, to recognize and  cleave specific genomic structures formed by the addition of two  oligonucleotide probes [Wild Type (WT) or Mutant (Mut)] to a nucleic  acid target.  Fluorescence signal intensity is enhanced through a  second Cleavasee cleavage Quantitative, Pregnancy    Collection Time: 10/26/17 10:34 AM   Result Value Ref Range    hCG Quant 1,764 (H) <5 IU/L   N. GONORRHOEAE CULTURE    Collection Time: 11/14/17 12:00 AM   Result Value Ref Range    Culture, Gonorrhoeae NEGATIVE    Chlamydia Culture    Collection Time: 11/14/17 12:00 AM   Result Value Ref Range    CHLAMYDIA CULTURE NEGATIVE    Human papillomavirus (HPV) DNA probe thin prep high risk    Collection Time: 11/14/17  8:59 AM   Result Value Ref Range    HPV SOURCE CERVICAL MATERIAL     HPV Sample . THIN PREP     HPV, Genotype 16 Not Detected NOTDET    HPV, Genotype 18 Not Detected NOTDET    HPV, High Risk Other Not Detected NOTDET    HPV, Interpretation         GYN Cytology    Collection Time: 11/14/17  9:54 AM   Result Value Ref Range    Cytology Report       (NOTE)  [id number]  [employer]  CONSULTING PATHOLOGISTS [facility]  ANATOMIC PATHOLOGY  [address] [place] [address]. [place], [address]  [phone]  Fax: [phone]  GYNECOLOGIC CYTOLOGY REPORT    Patient Name: Doreen Huber  MR#: [id number]  Specimen #[id number]  Source:  1: Cervical material, (ThinPrep vial, Imaging-assisted review)    Clinical History  Pregnant: positive test  High risk HPV DNA testing is requested if the diagnosis is abnormal    INTERPRETATION    Cervical material, (ThinPrep vial, Imaging-assisted review):  Specimen Adequacy:      Satisfactory for evaluation.      - Endocervical/transformation zone component present. - Partially obscuring cellular detail, due to thick clumps of  cells. Descriptive Diagnosis:      Low-grade squamous intraepithelial lesion (LSIL). Comments:      High Risk HPV testing was ordered.       Cytotechnologist:   Amie Be M.D.  **Electronically Signed Out**         ajb/11/22/2017          Procedure/Addendum   HPV Procedure Report     Date Ordered:     11/22/2017     Status:  Signed Out      Date Complete:     11/22/2017     By: CARRIE Martínez(ASCP)      Date herpes: No  3) Rash/viral illness since LMP:  No  4) History of STD:    No  5) Other: NA  -------------------------------------------------------------------------

## 2017-12-06 ENCOUNTER — HOSPITAL ENCOUNTER (OUTPATIENT)
Age: 32
Setting detail: SPECIMEN
Discharge: HOME | End: 2017-12-06
Payer: COMMERCIAL

## 2017-12-06 DIAGNOSIS — Z32.01 POSITIVE PREGNANCY TEST: ICD-10-CM

## 2017-12-06 LAB
ABSOLUTE EOS #: 0.11 K/UL (ref 0–0.44)
ABSOLUTE IMMATURE GRANULOCYTE: 0.06 K/UL (ref 0–0.3)
ABSOLUTE LYMPH #: 2.18 K/UL (ref 1.1–3.7)
ABSOLUTE MONO #: 0.75 K/UL (ref 0.1–1.2)
BASOPHILS # BLD: 0 % (ref 0–2)
BASOPHILS ABSOLUTE: 0.05 K/UL (ref 0–0.2)
BILIRUBIN URINE: NEGATIVE
COLOR: ABNORMAL
COMMENT UA: ABNORMAL
DIFFERENTIAL TYPE: ABNORMAL
EOSINOPHILS RELATIVE PERCENT: 1 % (ref 1–4)
GLUCOSE ADMINISTRATION: NORMAL
GLUCOSE TOLERANCE SCREEN 50G: 118 MG/DL (ref 70–135)
GLUCOSE URINE: NEGATIVE
HCT VFR BLD CALC: 39.5 % (ref 36.3–47.1)
HEMOGLOBIN: 12.9 G/DL (ref 11.9–15.1)
HEPATITIS B SURFACE ANTIGEN: NONREACTIVE
HIV AG/AB: NONREACTIVE
IMMATURE GRANULOCYTES: 0 %
KETONES, URINE: NEGATIVE
LEUKOCYTE ESTERASE, URINE: NEGATIVE
LYMPHOCYTES # BLD: 14 % (ref 24–43)
MCH RBC QN AUTO: 29.5 PG (ref 25.2–33.5)
MCHC RBC AUTO-ENTMCNC: 32.7 G/DL (ref 28.4–34.8)
MCV RBC AUTO: 90.2 FL (ref 82.6–102.9)
MONOCYTES # BLD: 5 % (ref 3–12)
NITRITE, URINE: NEGATIVE
PDW BLD-RTO: 14.6 % (ref 11.8–14.4)
PH UA: 5.5 (ref 5–8)
PLATELET # BLD: 338 K/UL (ref 138–453)
PLATELET ESTIMATE: ABNORMAL
PMV BLD AUTO: 9.6 FL (ref 8.1–13.5)
PROTEIN UA: NEGATIVE
RBC # BLD: 4.38 M/UL (ref 3.95–5.11)
RBC # BLD: ABNORMAL 10*6/UL
RUBV IGG SER QL: 11.6 IU/ML
SEG NEUTROPHILS: 80 % (ref 36–65)
SEGMENTED NEUTROPHILS ABSOLUTE COUNT: 12.1 K/UL (ref 1.5–8.1)
SPECIFIC GRAVITY UA: 1.02 (ref 1–1.03)
T. PALLIDUM, IGG: NONREACTIVE
TURBIDITY: CLEAR
URINE HGB: NEGATIVE
UROBILINOGEN, URINE: NORMAL
WBC # BLD: 15.3 K/UL (ref 3.5–11.3)
WBC # BLD: ABNORMAL 10*3/UL

## 2017-12-07 LAB
ABO/RH: NORMAL
ANTIBODY SCREEN: NEGATIVE
CULTURE: NORMAL
CULTURE: NORMAL
HISTORY CHECK: NORMAL
Lab: NORMAL
SPECIMEN DESCRIPTION: NORMAL
STATUS: NORMAL

## 2017-12-13 ENCOUNTER — ROUTINE PRENATAL (OUTPATIENT)
Dept: PERINATAL CARE | Age: 32
End: 2017-12-13
Payer: COMMERCIAL

## 2017-12-13 VITALS
DIASTOLIC BLOOD PRESSURE: 75 MMHG | RESPIRATION RATE: 18 BRPM | HEART RATE: 87 BPM | SYSTOLIC BLOOD PRESSURE: 117 MMHG | TEMPERATURE: 99 F | HEIGHT: 59 IN | BODY MASS INDEX: 42.64 KG/M2 | WEIGHT: 211.5 LBS

## 2017-12-13 DIAGNOSIS — Z36.9 FIRST TRIMESTER SCREENING: Primary | ICD-10-CM

## 2017-12-13 DIAGNOSIS — Z3A.12 12 WEEKS GESTATION OF PREGNANCY: ICD-10-CM

## 2017-12-13 DIAGNOSIS — O99.211 OBESITY AFFECTING PREGNANCY IN FIRST TRIMESTER: ICD-10-CM

## 2017-12-13 DIAGNOSIS — O36.80X0 ENCOUNTER TO DETERMINE FETAL VIABILITY OF PREGNANCY, SINGLE OR UNSPECIFIED FETUS: ICD-10-CM

## 2017-12-13 LAB — CYSTIC FIBROSIS: NORMAL

## 2017-12-13 PROCEDURE — 76801 OB US < 14 WKS SINGLE FETUS: CPT | Performed by: OBSTETRICS & GYNECOLOGY

## 2017-12-13 PROCEDURE — 76813 OB US NUCHAL MEAS 1 GEST: CPT | Performed by: OBSTETRICS & GYNECOLOGY

## 2017-12-13 NOTE — PATIENT INSTRUCTIONS
Patient advised to follow up with referring physician.     Advance Directive  Patient has NOT completed an advance directive   Discussed with: Patient

## 2017-12-29 ENCOUNTER — ROUTINE PRENATAL (OUTPATIENT)
Dept: OBGYN CLINIC | Age: 32
End: 2017-12-29
Payer: COMMERCIAL

## 2017-12-29 VITALS — WEIGHT: 212 LBS | SYSTOLIC BLOOD PRESSURE: 124 MMHG | DIASTOLIC BLOOD PRESSURE: 76 MMHG | BODY MASS INDEX: 42.82 KG/M2

## 2017-12-29 DIAGNOSIS — Z36.89 SCREENING, ANTENATAL, FOR FETAL ANATOMIC SURVEY: ICD-10-CM

## 2017-12-29 DIAGNOSIS — Z34.91 NORMAL PREGNANCY, FIRST TRIMESTER: Primary | ICD-10-CM

## 2017-12-29 DIAGNOSIS — Z3A.14 14 WEEKS GESTATION OF PREGNANCY: ICD-10-CM

## 2017-12-29 PROCEDURE — 90688 IIV4 VACCINE SPLT 0.5 ML IM: CPT | Performed by: OBSTETRICS & GYNECOLOGY

## 2017-12-29 PROCEDURE — 90471 IMMUNIZATION ADMIN: CPT | Performed by: OBSTETRICS & GYNECOLOGY

## 2017-12-29 PROCEDURE — 0502F SUBSEQUENT PRENATAL CARE: CPT | Performed by: OBSTETRICS & GYNECOLOGY

## 2017-12-29 NOTE — PROGRESS NOTES
Chief complaint: Here for Prenatal Visit    +FM, -ctxns, -VB, -LOF    /76   Wt 212 lb (96.2 kg)   LMP 09/20/2017   BMI 42.82 kg/m²       Gen-NAD  CVS-RRR  Resp-nonlabored  Abd-soft, nontender, gravid  Ext-no edema      ICD-10-CM ICD-9-CM    1. Normal pregnancy, first trimester Z34.91 V22.2 Alpha Fetoprotein, Maternal   2. 14 weeks gestation of pregnancy Z3A.14 V22.2 Alpha Fetoprotein, Maternal     NT and hcg/Attila-A normal  Flu shot today  Planning trip to [facility] for 's work; advised she not go and use condoms x 6 months after her  goes due to [place] risk. Pt states she is planning to go, advised mosquito precautions and DEET insect repellant. MSAFP ordered, to be obtained between 16-22 weeks. Anatomy USN scheduled at [facility] 2/7/18, pt may want to do here instead.

## 2018-01-04 ENCOUNTER — TELEPHONE (OUTPATIENT)
Dept: OBGYN CLINIC | Age: 33
End: 2018-01-04

## 2018-02-02 ENCOUNTER — HOSPITAL ENCOUNTER (OUTPATIENT)
Age: 33
Setting detail: SPECIMEN
Discharge: HOME | End: 2018-02-02
Payer: COMMERCIAL

## 2018-02-02 DIAGNOSIS — Z34.91 NORMAL PREGNANCY, FIRST TRIMESTER: ICD-10-CM

## 2018-02-02 DIAGNOSIS — Z3A.14 14 WEEKS GESTATION OF PREGNANCY: ICD-10-CM

## 2018-02-05 LAB
AFP INTERPRETATION: NORMAL
AFP MOM: 0.78
AFP SPECIMEN: NORMAL
AFP: 31 NG/ML
DATE OF BIRTH: NORMAL
DATING METHOD: NORMAL
DETERMINED BY: NORMAL
DIABETIC: NORMAL
DUE DATE: NORMAL
ESTIMATED DUE DATE: NORMAL
FAMILY HISTORY NTD: NORMAL
GESTATIONAL AGE: 19.29 WEEKS
HISTORY OF ANEUPLOIDY?: NORMAL
INSULIN REQ DIABETES: NO
LAST MENSTRUAL PERIOD: NORMAL
MATERNAL AGE AT EDD: 34.9 YR
MATERNAL WEIGHT: 212
NUMBER OF FETUSES: NORMAL
PATIENT WEIGHT: 212 LBS
PHYSICIAN: NORMAL
RACE (MATERNAL): NORMAL
RACE: NORMAL
ZZ NTE CLEAN UP: HISTORY: NO

## 2018-02-07 ENCOUNTER — PROCEDURE VISIT (OUTPATIENT)
Dept: OBGYN CLINIC | Age: 33
End: 2018-02-07
Payer: COMMERCIAL

## 2018-02-07 ENCOUNTER — ROUTINE PRENATAL (OUTPATIENT)
Dept: OBGYN CLINIC | Age: 33
End: 2018-02-07

## 2018-02-07 VITALS — WEIGHT: 220 LBS | BODY MASS INDEX: 44.43 KG/M2 | SYSTOLIC BLOOD PRESSURE: 126 MMHG | DIASTOLIC BLOOD PRESSURE: 70 MMHG

## 2018-02-07 DIAGNOSIS — Z36.89 SCREENING, ANTENATAL, FOR FETAL ANATOMIC SURVEY: ICD-10-CM

## 2018-02-07 DIAGNOSIS — Z3A.20 20 WEEKS GESTATION OF PREGNANCY: Primary | ICD-10-CM

## 2018-02-07 LAB
ABDOMINAL CIRCUMFERENCE: NORMAL CM
BIPARIETAL DIAMETER: NORMAL CM
ESTIMATED FETAL WEIGHT: NORMAL GRAMS
FEMORAL DIAMETER: NORMAL CM
FEMORAL LENGTH: NORMAL CM
HC/AC: NORMAL
HEAD CIRCUMFERENCE: NORMAL CM

## 2018-02-07 PROCEDURE — 76805 OB US >/= 14 WKS SNGL FETUS: CPT | Performed by: OBSTETRICS & GYNECOLOGY

## 2018-02-07 PROCEDURE — 0502F SUBSEQUENT PRENATAL CARE: CPT | Performed by: OBSTETRICS & GYNECOLOGY

## 2018-02-08 ENCOUNTER — TELEPHONE (OUTPATIENT)
Dept: OBGYN CLINIC | Age: 33
End: 2018-02-08

## 2018-02-08 ENCOUNTER — OFFICE VISIT (OUTPATIENT)
Dept: FAMILY MEDICINE CLINIC | Age: 33
End: 2018-02-08
Payer: COMMERCIAL

## 2018-02-08 VITALS
SYSTOLIC BLOOD PRESSURE: 105 MMHG | DIASTOLIC BLOOD PRESSURE: 74 MMHG | TEMPERATURE: 99 F | OXYGEN SATURATION: 98 % | HEIGHT: 59 IN | HEART RATE: 114 BPM | WEIGHT: 223 LBS | BODY MASS INDEX: 44.96 KG/M2

## 2018-02-08 DIAGNOSIS — R50.9 FEVER, UNSPECIFIED FEVER CAUSE: ICD-10-CM

## 2018-02-08 DIAGNOSIS — J11.1 INFLUENZA-LIKE ILLNESS: Primary | ICD-10-CM

## 2018-02-08 DIAGNOSIS — R05.9 COUGH: ICD-10-CM

## 2018-02-08 LAB
INFLUENZA A ANTIBODY: NORMAL
INFLUENZA B ANTIBODY: NORMAL

## 2018-02-08 PROCEDURE — 99213 OFFICE O/P EST LOW 20 MIN: CPT | Performed by: NURSE PRACTITIONER

## 2018-02-08 PROCEDURE — 87804 INFLUENZA ASSAY W/OPTIC: CPT | Performed by: NURSE PRACTITIONER

## 2018-02-08 RX ORDER — OSELTAMIVIR PHOSPHATE 75 MG/1
75 CAPSULE ORAL 2 TIMES DAILY
Qty: 10 CAPSULE | Refills: 0 | Status: SHIPPED | OUTPATIENT
Start: 2018-02-08 | End: 2018-02-13

## 2018-02-08 ASSESSMENT — ENCOUNTER SYMPTOMS
VOMITING: 0
DIARRHEA: 0
RHINORRHEA: 1
COUGH: 1
NAUSEA: 0
WHEEZING: 0
SINUS PAIN: 0
SINUS PRESSURE: 0
SORE THROAT: 1
SHORTNESS OF BREATH: 1
CHEST TIGHTNESS: 1
ABDOMINAL PAIN: 0

## 2018-02-08 NOTE — TELEPHONE ENCOUNTER
OB 6 months with c/o head , chest congestion, dry cough temperature of 100. DR Zachery Harkins recommended robitusin but not helping. Advised her to see her PCP or go to urgent care.  Patient verbalized understanding

## 2018-03-07 ENCOUNTER — ROUTINE PRENATAL (OUTPATIENT)
Dept: OBGYN CLINIC | Age: 33
End: 2018-03-07

## 2018-03-07 VITALS — SYSTOLIC BLOOD PRESSURE: 134 MMHG | WEIGHT: 230 LBS | BODY MASS INDEX: 46.45 KG/M2 | DIASTOLIC BLOOD PRESSURE: 80 MMHG

## 2018-03-07 DIAGNOSIS — Z3A.24 24 WEEKS GESTATION OF PREGNANCY: Primary | ICD-10-CM

## 2018-03-07 PROCEDURE — 0502F SUBSEQUENT PRENATAL CARE: CPT | Performed by: OBSTETRICS & GYNECOLOGY

## 2018-04-02 ENCOUNTER — HOSPITAL ENCOUNTER (OUTPATIENT)
Age: 33
Setting detail: SPECIMEN
Discharge: HOME | End: 2018-04-02
Payer: COMMERCIAL

## 2018-04-02 DIAGNOSIS — Z3A.24 24 WEEKS GESTATION OF PREGNANCY: ICD-10-CM

## 2018-04-02 LAB
GLUCOSE ADMINISTRATION: ABNORMAL
GLUCOSE TOLERANCE SCREEN 50G: 136 MG/DL (ref 70–135)
HCT VFR BLD CALC: 35.4 % (ref 36.3–47.1)
HEMOGLOBIN: 11.3 G/DL (ref 11.9–15.1)
MCH RBC QN AUTO: 29.4 PG (ref 25.2–33.5)
MCHC RBC AUTO-ENTMCNC: 31.9 G/DL (ref 28.4–34.8)
MCV RBC AUTO: 92.2 FL (ref 82.6–102.9)
NRBC AUTOMATED: 0 PER 100 WBC
PDW BLD-RTO: 14.2 % (ref 11.8–14.4)
PLATELET # BLD: 384 K/UL (ref 138–453)
PMV BLD AUTO: 9.5 FL (ref 8.1–13.5)
RBC # BLD: 3.84 M/UL (ref 3.95–5.11)
WBC # BLD: 17.1 K/UL (ref 3.5–11.3)

## 2018-04-04 ENCOUNTER — ROUTINE PRENATAL (OUTPATIENT)
Dept: OBGYN CLINIC | Age: 33
End: 2018-04-04
Payer: COMMERCIAL

## 2018-04-04 VITALS
HEART RATE: 88 BPM | WEIGHT: 240 LBS | BODY MASS INDEX: 48.47 KG/M2 | SYSTOLIC BLOOD PRESSURE: 124 MMHG | DIASTOLIC BLOOD PRESSURE: 72 MMHG

## 2018-04-04 DIAGNOSIS — Z83.2 FAMILY HISTORY OF FACTOR V DEFICIENCY: ICD-10-CM

## 2018-04-04 DIAGNOSIS — Z23 NEED FOR VACCINATION: ICD-10-CM

## 2018-04-04 DIAGNOSIS — R73.09 ELEVATED GLUCOSE: Primary | ICD-10-CM

## 2018-04-04 DIAGNOSIS — Z3A.28 28 WEEKS GESTATION OF PREGNANCY: ICD-10-CM

## 2018-04-04 DIAGNOSIS — O09.812 HIGH RISK PREGNANCY DUE TO ASSISTED REPRODUCTIVE TECHNOLOGY IN SECOND TRIMESTER: ICD-10-CM

## 2018-04-04 PROCEDURE — 90715 TDAP VACCINE 7 YRS/> IM: CPT | Performed by: NURSE PRACTITIONER

## 2018-04-04 PROCEDURE — 0502F SUBSEQUENT PRENATAL CARE: CPT | Performed by: NURSE PRACTITIONER

## 2018-04-04 PROCEDURE — 90471 IMMUNIZATION ADMIN: CPT | Performed by: NURSE PRACTITIONER

## 2018-04-05 ENCOUNTER — HOSPITAL ENCOUNTER (OUTPATIENT)
Age: 33
Setting detail: SPECIMEN
Discharge: HOME | End: 2018-04-05
Payer: COMMERCIAL

## 2018-04-05 DIAGNOSIS — O99.810 ABNORMAL GLUCOSE AFFECTING PREGNANCY: Primary | ICD-10-CM

## 2018-04-05 DIAGNOSIS — R73.09 ELEVATED GLUCOSE: ICD-10-CM

## 2018-04-05 PROBLEM — O24.419 GESTATIONAL DIABETES MELLITUS (GDM) IN THIRD TRIMESTER: Status: ACTIVE | Noted: 2018-04-05

## 2018-04-05 LAB
3 HR GLUCOSE: 86 MG/DL (ref 65–139)
AMOUNT GLUCOSE GIVEN: 100 G
GLUCOSE FASTING: 90 MG/DL (ref 65–94)
GLUCOSE TOLERANCE TEST 1 HOUR: 184 MG/DL (ref 65–179)
GLUCOSE TOLERANCE TEST 2 HOUR: 159 MG/DL (ref 65–154)

## 2018-04-06 ENCOUNTER — TELEPHONE (OUTPATIENT)
Dept: PERINATAL CARE | Age: 33
End: 2018-04-06

## 2018-04-17 ENCOUNTER — HOSPITAL ENCOUNTER (OUTPATIENT)
Dept: DIABETES SERVICES | Age: 33
Setting detail: THERAPIES SERIES
Discharge: HOME | End: 2018-04-17
Payer: COMMERCIAL

## 2018-04-17 DIAGNOSIS — O24.410 DIET CONTROLLED GESTATIONAL DIABETES MELLITUS (GDM) IN THIRD TRIMESTER: Primary | ICD-10-CM

## 2018-04-17 PROCEDURE — G0108 DIAB MANAGE TRN  PER INDIV: HCPCS

## 2018-04-18 ENCOUNTER — ROUTINE PRENATAL (OUTPATIENT)
Dept: OBGYN CLINIC | Age: 33
End: 2018-04-18

## 2018-04-18 VITALS
SYSTOLIC BLOOD PRESSURE: 114 MMHG | HEART RATE: 90 BPM | DIASTOLIC BLOOD PRESSURE: 78 MMHG | BODY MASS INDEX: 49 KG/M2 | WEIGHT: 242.6 LBS

## 2018-04-18 DIAGNOSIS — O09.93 SUPERVISION OF HIGH RISK PREGNANCY IN THIRD TRIMESTER: ICD-10-CM

## 2018-04-18 DIAGNOSIS — Z3A.30 30 WEEKS GESTATION OF PREGNANCY: Primary | ICD-10-CM

## 2018-04-18 DIAGNOSIS — Z83.2 FAMILY HISTORY OF FACTOR V DEFICIENCY: ICD-10-CM

## 2018-04-18 DIAGNOSIS — O99.810 ABNORMAL GLUCOSE AFFECTING PREGNANCY: ICD-10-CM

## 2018-04-18 PROCEDURE — 0502F SUBSEQUENT PRENATAL CARE: CPT | Performed by: NURSE PRACTITIONER

## 2018-04-30 ENCOUNTER — ROUTINE PRENATAL (OUTPATIENT)
Dept: PERINATAL CARE | Age: 33
End: 2018-04-30
Payer: COMMERCIAL

## 2018-04-30 ENCOUNTER — HOSPITAL ENCOUNTER (OUTPATIENT)
Dept: DIABETES SERVICES | Age: 33
Setting detail: THERAPIES SERIES
Discharge: HOME | End: 2018-04-30
Payer: COMMERCIAL

## 2018-04-30 VITALS
HEART RATE: 104 BPM | TEMPERATURE: 98.2 F | SYSTOLIC BLOOD PRESSURE: 124 MMHG | HEIGHT: 59 IN | WEIGHT: 242 LBS | DIASTOLIC BLOOD PRESSURE: 74 MMHG | RESPIRATION RATE: 20 BRPM | BODY MASS INDEX: 48.79 KG/M2

## 2018-04-30 DIAGNOSIS — O24.414 INSULIN CONTROLLED GESTATIONAL DIABETES MELLITUS (GDM) IN THIRD TRIMESTER: Primary | ICD-10-CM

## 2018-04-30 DIAGNOSIS — Z3A.31 31 WEEKS GESTATION OF PREGNANCY: ICD-10-CM

## 2018-04-30 DIAGNOSIS — O24.410 DIET CONTROLLED GESTATIONAL DIABETES MELLITUS (GDM) IN THIRD TRIMESTER: Primary | ICD-10-CM

## 2018-04-30 DIAGNOSIS — O99.213 OBESITY AFFECTING PREGNANCY IN THIRD TRIMESTER: ICD-10-CM

## 2018-04-30 PROCEDURE — 76819 FETAL BIOPHYS PROFIL W/O NST: CPT | Performed by: OBSTETRICS & GYNECOLOGY

## 2018-04-30 PROCEDURE — G0108 DIAB MANAGE TRN  PER INDIV: HCPCS

## 2018-04-30 PROCEDURE — 76811 OB US DETAILED SNGL FETUS: CPT | Performed by: OBSTETRICS & GYNECOLOGY

## 2018-04-30 PROCEDURE — 99242 OFF/OP CONSLTJ NEW/EST SF 20: CPT | Performed by: OBSTETRICS & GYNECOLOGY

## 2018-04-30 RX ORDER — BLOOD-GLUCOSE METER
KIT MISCELLANEOUS
Refills: 2 | Status: ON HOLD | COMMUNITY
Start: 2018-04-06 | End: 2018-06-24 | Stop reason: HOSPADM

## 2018-04-30 RX ORDER — LANCETS 28 GAUGE
EACH MISCELLANEOUS
Refills: 2 | Status: ON HOLD | COMMUNITY
Start: 2018-04-06 | End: 2018-06-24 | Stop reason: HOSPADM

## 2018-04-30 RX ORDER — BLOOD-GLUCOSE METER
KIT MISCELLANEOUS
Refills: 0 | Status: ON HOLD | COMMUNITY
Start: 2018-04-06 | End: 2018-06-24 | Stop reason: HOSPADM

## 2018-05-02 PROBLEM — O99.211 OBESITY AFFECTING PREGNANCY IN FIRST TRIMESTER: Status: RESOLVED | Noted: 2017-12-13 | Resolved: 2018-05-02

## 2018-05-03 ENCOUNTER — APPOINTMENT (OUTPATIENT)
Dept: GENERAL RADIOLOGY | Age: 33
End: 2018-05-03
Payer: COMMERCIAL

## 2018-05-03 ENCOUNTER — HOSPITAL ENCOUNTER (EMERGENCY)
Age: 33
Discharge: HOME | End: 2018-05-03
Attending: EMERGENCY MEDICINE
Payer: COMMERCIAL

## 2018-05-03 ENCOUNTER — ROUTINE PRENATAL (OUTPATIENT)
Dept: OBGYN CLINIC | Age: 33
End: 2018-05-03
Payer: COMMERCIAL

## 2018-05-03 ENCOUNTER — HOSPITAL ENCOUNTER (OUTPATIENT)
Age: 33
Setting detail: OBSERVATION
Discharge: HOME | End: 2018-05-04
Attending: OBSTETRICS & GYNECOLOGY | Admitting: OBSTETRICS & GYNECOLOGY
Payer: COMMERCIAL

## 2018-05-03 VITALS
DIASTOLIC BLOOD PRESSURE: 78 MMHG | OXYGEN SATURATION: 97 % | SYSTOLIC BLOOD PRESSURE: 136 MMHG | HEART RATE: 74 BPM | TEMPERATURE: 97.7 F | RESPIRATION RATE: 16 BRPM

## 2018-05-03 VITALS
SYSTOLIC BLOOD PRESSURE: 126 MMHG | HEART RATE: 86 BPM | DIASTOLIC BLOOD PRESSURE: 60 MMHG | WEIGHT: 242 LBS | BODY MASS INDEX: 48.88 KG/M2

## 2018-05-03 DIAGNOSIS — O24.410 DIET CONTROLLED GESTATIONAL DIABETES MELLITUS (GDM) IN THIRD TRIMESTER: Primary | ICD-10-CM
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
## 2018-05-03 DIAGNOSIS — Z3A.32 32 WEEKS GESTATION OF PREGNANCY: ICD-10-CM
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
## 2018-05-03 PROBLEM — O09.93 HRP (HIGH RISK PREGNANCY), THIRD TRIMESTER: Status: ACTIVE | Noted: 2018-05-03

## 2018-05-03 LAB
% FETAL BLEED: NORMAL %
ABO/RH: NORMAL
ABSOLUTE EOS #: 0.08 K/UL (ref 0–0.44)
ABSOLUTE IMMATURE GRANULOCYTE: 0.08 K/UL (ref 0–0.3)
ABSOLUTE LYMPH #: 1.69 K/UL (ref 1.1–3.7)
ABSOLUTE MONO #: 0.97 K/UL (ref 0.1–1.2)
ANTIBODY SCREEN: NEGATIVE
ARM BAND NUMBER: NORMAL
BASOPHILS # BLD: 0 % (ref 0–2)
BASOPHILS ABSOLUTE: 0.04 K/UL (ref 0–0.2)
DIFFERENTIAL TYPE: ABNORMAL
EOSINOPHILS RELATIVE PERCENT: 1 % (ref 1–4)
EXPIRATION DATE: NORMAL
FETAL BLEED VOLUME: NORMAL ML
GLUCOSE BLD-MCNC: 147 MG/DL (ref 65–105)
HCT VFR BLD CALC: 35.8 % (ref 36.3–47.1)
HEMOGLOBIN: 11.8 G/DL (ref 11.9–15.1)
IMMATURE GRANULOCYTES: 1 %
LYMPHOCYTES # BLD: 11 % (ref 24–43)
MCH RBC QN AUTO: 29.9 PG (ref 25.2–33.5)
MCHC RBC AUTO-ENTMCNC: 33 G/DL (ref 28.4–34.8)
MCV RBC AUTO: 90.6 FL (ref 82.6–102.9)
MONOCYTES # BLD: 7 % (ref 3–12)
NRBC AUTOMATED: 0 PER 100 WBC
PDW BLD-RTO: 14.6 % (ref 11.8–14.4)
PLATELET # BLD: 352 K/UL (ref 138–453)
PLATELET ESTIMATE: ABNORMAL
PMV BLD AUTO: 9.7 FL (ref 8.1–13.5)
RBC # BLD: 3.95 M/UL (ref 3.95–5.11)
RBC # BLD: ABNORMAL 10*6/UL
RHOGAM DOSES REQUIRED: NORMAL
SEG NEUTROPHILS: 80 % (ref 36–65)
SEGMENTED NEUTROPHILS ABSOLUTE COUNT: 12.14 K/UL (ref 1.5–8.1)
T. PALLIDUM, IGG: NONREACTIVE
WBC # BLD: 15 K/UL (ref 3.5–11.3)
WBC # BLD: ABNORMAL 10*3/UL

## 2018-05-03 PROCEDURE — 59025 FETAL NON-STRESS TEST: CPT | Performed by: NURSE PRACTITIONER

## 2018-05-03 PROCEDURE — 86901 BLOOD TYPING SEROLOGIC RH(D): CPT

## 2018-05-03 PROCEDURE — 99283 EMERGENCY DEPT VISIT LOW MDM: CPT

## 2018-05-03 PROCEDURE — 82947 ASSAY GLUCOSE BLOOD QUANT: CPT

## 2018-05-03 PROCEDURE — 0502F SUBSEQUENT PRENATAL CARE: CPT | Performed by: NURSE PRACTITIONER

## 2018-05-03 PROCEDURE — G0378 HOSPITAL OBSERVATION PER HR: HCPCS

## 2018-05-03 PROCEDURE — 71046 X-RAY EXAM CHEST 2 VIEWS: CPT

## 2018-05-03 PROCEDURE — 36415 COLL VENOUS BLD VENIPUNCTURE: CPT

## 2018-05-03 PROCEDURE — 6370000000 HC RX 637 (ALT 250 FOR IP): Performed by: EMERGENCY MEDICINE

## 2018-05-03 PROCEDURE — 6370000000 HC RX 637 (ALT 250 FOR IP): Performed by: STUDENT IN AN ORGANIZED HEALTH CARE EDUCATION/TRAINING PROGRAM

## 2018-05-03 PROCEDURE — 86850 RBC ANTIBODY SCREEN: CPT

## 2018-05-03 PROCEDURE — 86780 TREPONEMA PALLIDUM: CPT

## 2018-05-03 PROCEDURE — 85460 HEMOGLOBIN FETAL: CPT

## 2018-05-03 PROCEDURE — 85025 COMPLETE CBC W/AUTO DIFF WBC: CPT

## 2018-05-03 PROCEDURE — 86900 BLOOD TYPING SEROLOGIC ABO: CPT

## 2018-05-03 PROCEDURE — 6370000000 HC RX 637 (ALT 250 FOR IP): Performed by: OBSTETRICS & GYNECOLOGY

## 2018-05-03 RX ORDER — PRENATAL WITH FERROUS FUM AND FOLIC ACID 3080; 920; 120; 400; 22; 1.84; 3; 20; 10; 1; 12; 200; 27; 25; 2 [IU]/1; [IU]/1; MG/1; [IU]/1; MG/1; MG/1; MG/1; MG/1; MG/1; MG/1; UG/1; MG/1; MG/1; MG/1; MG/1
1 TABLET ORAL DAILY
Status: DISCONTINUED | OUTPATIENT
Start: 2018-05-04 | End: 2018-05-04 | Stop reason: HOSPADM

## 2018-05-03 RX ORDER — GLUCAGON 1 MG/ML
1 KIT INJECTION PRN
Status: DISCONTINUED | OUTPATIENT
Start: 2018-05-03 | End: 2018-05-04 | Stop reason: HOSPADM

## 2018-05-03 RX ORDER — ACETAMINOPHEN 500 MG
1000 TABLET ORAL EVERY 6 HOURS PRN
Status: DISCONTINUED | OUTPATIENT
Start: 2018-05-03 | End: 2018-05-04 | Stop reason: HOSPADM

## 2018-05-03 RX ORDER — ACETAMINOPHEN 500 MG
1000 TABLET ORAL ONCE
Status: COMPLETED | OUTPATIENT
Start: 2018-05-03 | End: 2018-05-03

## 2018-05-03 RX ORDER — DEXTROSE MONOHYDRATE 25 G/50ML
12.5 INJECTION, SOLUTION INTRAVENOUS PRN
Status: DISCONTINUED | OUTPATIENT
Start: 2018-05-03 | End: 2018-05-04 | Stop reason: HOSPADM

## 2018-05-03 RX ORDER — HUMAN INSULIN 100 [IU]/ML
INJECTION, SUSPENSION SUBCUTANEOUS
Refills: 0 | Status: ON HOLD | COMMUNITY
Start: 2018-05-01 | End: 2018-06-24 | Stop reason: HOSPADM

## 2018-05-03 RX ORDER — DIPHENHYDRAMINE HCL 25 MG
50 TABLET ORAL NIGHTLY PRN
Status: DISCONTINUED | OUTPATIENT
Start: 2018-05-03 | End: 2018-05-04 | Stop reason: HOSPADM

## 2018-05-03 RX ORDER — DEXTROSE MONOHYDRATE 50 MG/ML
100 INJECTION, SOLUTION INTRAVENOUS PRN
Status: DISCONTINUED | OUTPATIENT
Start: 2018-05-03 | End: 2018-05-04 | Stop reason: HOSPADM

## 2018-05-03 RX ORDER — NICOTINE POLACRILEX 4 MG
15 LOZENGE BUCCAL PRN
Status: DISCONTINUED | OUTPATIENT
Start: 2018-05-03 | End: 2018-05-04 | Stop reason: HOSPADM

## 2018-05-03 RX ADMIN — DIPHENHYDRAMINE HCL 50 MG: 25 TABLET ORAL at 23:28

## 2018-05-03 RX ADMIN — ACETAMINOPHEN 1000 MG: 500 TABLET ORAL at 11:29

## 2018-05-03 RX ADMIN — INSULIN HUMAN 10 UNITS: 100 INJECTION, SUSPENSION SUBCUTANEOUS at 21:10

## 2018-05-03 RX ADMIN — ACETAMINOPHEN 1000 MG: 500 TABLET ORAL at 21:10

## 2018-05-03 RX ADMIN — ACETAMINOPHEN 1000 MG: 500 TABLET ORAL at 15:19

## 2018-05-03 ASSESSMENT — PAIN SCALES - GENERAL
PAINLEVEL_OUTOF10: 3
PAINLEVEL_OUTOF10: 5
PAINLEVEL_OUTOF10: 6
PAINLEVEL_OUTOF10: 6

## 2018-05-03 ASSESSMENT — ENCOUNTER SYMPTOMS
SHORTNESS OF BREATH: 0
ABDOMINAL PAIN: 1
RHINORRHEA: 0

## 2018-05-04 VITALS
RESPIRATION RATE: 16 BRPM | WEIGHT: 242 LBS | TEMPERATURE: 98.4 F | SYSTOLIC BLOOD PRESSURE: 110 MMHG | DIASTOLIC BLOOD PRESSURE: 65 MMHG | HEART RATE: 100 BPM | HEIGHT: 59 IN | BODY MASS INDEX: 48.79 KG/M2

## 2018-05-04 LAB — GLUCOSE BLD-MCNC: 92 MG/DL (ref 65–105)

## 2018-05-04 PROCEDURE — 99215 OFFICE O/P EST HI 40 MIN: CPT

## 2018-05-04 PROCEDURE — 6370000000 HC RX 637 (ALT 250 FOR IP): Performed by: STUDENT IN AN ORGANIZED HEALTH CARE EDUCATION/TRAINING PROGRAM

## 2018-05-04 PROCEDURE — G0378 HOSPITAL OBSERVATION PER HR: HCPCS

## 2018-05-04 PROCEDURE — 82947 ASSAY GLUCOSE BLOOD QUANT: CPT

## 2018-05-04 RX ADMIN — ACETAMINOPHEN 1000 MG: 500 TABLET ORAL at 05:25

## 2018-05-04 ASSESSMENT — PAIN SCALES - GENERAL: PAINLEVEL_OUTOF10: 6

## 2018-05-08 ENCOUNTER — PROCEDURE VISIT (OUTPATIENT)
Dept: OBGYN CLINIC | Age: 33
End: 2018-05-08
Payer: COMMERCIAL

## 2018-05-08 VITALS — BODY MASS INDEX: 48.8 KG/M2 | WEIGHT: 241.6 LBS

## 2018-05-08 DIAGNOSIS — O24.414 INSULIN CONTROLLED GESTATIONAL DIABETES MELLITUS (GDM) IN THIRD TRIMESTER: ICD-10-CM

## 2018-05-08 DIAGNOSIS — Z3A.32 32 WEEKS GESTATION OF PREGNANCY: Primary | ICD-10-CM

## 2018-05-08 PROCEDURE — 76818 FETAL BIOPHYS PROFILE W/NST: CPT | Performed by: OBSTETRICS & GYNECOLOGY

## 2018-05-11 ENCOUNTER — ROUTINE PRENATAL (OUTPATIENT)
Dept: OBGYN CLINIC | Age: 33
End: 2018-05-11
Payer: COMMERCIAL

## 2018-05-11 VITALS — WEIGHT: 245.6 LBS | SYSTOLIC BLOOD PRESSURE: 118 MMHG | DIASTOLIC BLOOD PRESSURE: 70 MMHG | BODY MASS INDEX: 49.61 KG/M2

## 2018-05-11 DIAGNOSIS — O24.414 INSULIN CONTROLLED GESTATIONAL DIABETES MELLITUS (GDM) IN THIRD TRIMESTER: ICD-10-CM

## 2018-05-11 DIAGNOSIS — O09.93 HRP (HIGH RISK PREGNANCY), THIRD TRIMESTER: Primary | ICD-10-CM

## 2018-05-11 DIAGNOSIS — Z3A.33 33 WEEKS GESTATION OF PREGNANCY: ICD-10-CM

## 2018-05-11 PROCEDURE — 59025 FETAL NON-STRESS TEST: CPT | Performed by: NURSE PRACTITIONER

## 2018-05-15 ENCOUNTER — PROCEDURE VISIT (OUTPATIENT)
Dept: OBGYN CLINIC | Age: 33
End: 2018-05-15
Payer: COMMERCIAL

## 2018-05-15 ENCOUNTER — ROUTINE PRENATAL (OUTPATIENT)
Dept: OBGYN CLINIC | Age: 33
End: 2018-05-15
Payer: COMMERCIAL

## 2018-05-15 VITALS — SYSTOLIC BLOOD PRESSURE: 114 MMHG | DIASTOLIC BLOOD PRESSURE: 76 MMHG | BODY MASS INDEX: 49.69 KG/M2 | WEIGHT: 246 LBS

## 2018-05-15 DIAGNOSIS — O24.414 INSULIN CONTROLLED GESTATIONAL DIABETES MELLITUS (GDM) IN THIRD TRIMESTER: ICD-10-CM

## 2018-05-15 DIAGNOSIS — Z3A.33 33 WEEKS GESTATION OF PREGNANCY: Primary | ICD-10-CM

## 2018-05-15 DIAGNOSIS — Z3A.33 33 WEEKS GESTATION OF PREGNANCY: ICD-10-CM

## 2018-05-15 DIAGNOSIS — O09.93 HRP (HIGH RISK PREGNANCY), THIRD TRIMESTER: Primary | ICD-10-CM

## 2018-05-15 PROCEDURE — 59025 FETAL NON-STRESS TEST: CPT | Performed by: NURSE PRACTITIONER

## 2018-05-15 PROCEDURE — 76818 FETAL BIOPHYS PROFILE W/NST: CPT | Performed by: OBSTETRICS & GYNECOLOGY

## 2018-05-18 ENCOUNTER — ROUTINE PRENATAL (OUTPATIENT)
Dept: OBGYN CLINIC | Age: 33
End: 2018-05-18
Payer: COMMERCIAL

## 2018-05-18 VITALS
DIASTOLIC BLOOD PRESSURE: 66 MMHG | BODY MASS INDEX: 49.36 KG/M2 | SYSTOLIC BLOOD PRESSURE: 110 MMHG | WEIGHT: 244.4 LBS | HEART RATE: 82 BPM

## 2018-05-18 DIAGNOSIS — O09.93 SUPERVISION OF HIGH RISK PREGNANCY IN THIRD TRIMESTER: ICD-10-CM

## 2018-05-18 DIAGNOSIS — Z3A.34 34 WEEKS GESTATION OF PREGNANCY: Primary | ICD-10-CM

## 2018-05-18 DIAGNOSIS — O09.93 HRP (HIGH RISK PREGNANCY), THIRD TRIMESTER: ICD-10-CM

## 2018-05-18 DIAGNOSIS — O24.414 INSULIN CONTROLLED GESTATIONAL DIABETES MELLITUS (GDM) IN THIRD TRIMESTER: ICD-10-CM

## 2018-05-18 PROCEDURE — 59025 FETAL NON-STRESS TEST: CPT | Performed by: NURSE PRACTITIONER

## 2018-05-18 PROCEDURE — 0502F SUBSEQUENT PRENATAL CARE: CPT | Performed by: NURSE PRACTITIONER

## 2018-05-22 ENCOUNTER — ROUTINE PRENATAL (OUTPATIENT)
Dept: PERINATAL CARE | Age: 33
End: 2018-05-22
Payer: COMMERCIAL

## 2018-05-22 VITALS
BODY MASS INDEX: 49.59 KG/M2 | SYSTOLIC BLOOD PRESSURE: 119 MMHG | WEIGHT: 246 LBS | HEIGHT: 59 IN | DIASTOLIC BLOOD PRESSURE: 81 MMHG | HEART RATE: 121 BPM | TEMPERATURE: 97.5 F | RESPIRATION RATE: 20 BRPM

## 2018-05-22 DIAGNOSIS — Z36.4 ANTENATAL SCREENING FOR FETAL GROWTH RETARDATION USING ULTRASONICS: ICD-10-CM

## 2018-05-22 DIAGNOSIS — O24.414 INSULIN CONTROLLED GESTATIONAL DIABETES MELLITUS (GDM) IN THIRD TRIMESTER: Primary | ICD-10-CM

## 2018-05-22 DIAGNOSIS — Z3A.34 34 WEEKS GESTATION OF PREGNANCY: ICD-10-CM

## 2018-05-22 DIAGNOSIS — O99.213 OBESITY AFFECTING PREGNANCY IN THIRD TRIMESTER: ICD-10-CM

## 2018-05-22 PROCEDURE — 76816 OB US FOLLOW-UP PER FETUS: CPT | Performed by: OBSTETRICS & GYNECOLOGY

## 2018-05-22 PROCEDURE — 99211 OFF/OP EST MAY X REQ PHY/QHP: CPT | Performed by: OBSTETRICS & GYNECOLOGY

## 2018-05-22 PROCEDURE — 76819 FETAL BIOPHYS PROFIL W/O NST: CPT | Performed by: OBSTETRICS & GYNECOLOGY

## 2018-05-25 ENCOUNTER — ROUTINE PRENATAL (OUTPATIENT)
Dept: OBGYN CLINIC | Age: 33
End: 2018-05-25
Payer: COMMERCIAL

## 2018-05-25 DIAGNOSIS — M79.7 FIBROMYALGIA: ICD-10-CM

## 2018-05-25 DIAGNOSIS — Z3A.35 35 WEEKS GESTATION OF PREGNANCY: ICD-10-CM

## 2018-05-25 DIAGNOSIS — O24.414 INSULIN CONTROLLED GESTATIONAL DIABETES MELLITUS (GDM) IN THIRD TRIMESTER: ICD-10-CM

## 2018-05-25 DIAGNOSIS — O09.93 HRP (HIGH RISK PREGNANCY), THIRD TRIMESTER: Primary | ICD-10-CM

## 2018-05-25 PROCEDURE — 59025 FETAL NON-STRESS TEST: CPT | Performed by: OBSTETRICS & GYNECOLOGY

## 2018-05-25 PROCEDURE — 0502F SUBSEQUENT PRENATAL CARE: CPT | Performed by: OBSTETRICS & GYNECOLOGY

## 2018-05-29 ENCOUNTER — PROCEDURE VISIT (OUTPATIENT)
Dept: OBGYN CLINIC | Age: 33
End: 2018-05-29
Payer: COMMERCIAL

## 2018-05-29 ENCOUNTER — ROUTINE PRENATAL (OUTPATIENT)
Dept: OBGYN CLINIC | Age: 33
End: 2018-05-29
Payer: COMMERCIAL

## 2018-05-29 DIAGNOSIS — Z3A.35 35 WEEKS GESTATION OF PREGNANCY: Primary | ICD-10-CM

## 2018-05-29 DIAGNOSIS — O99.213 OBESITY AFFECTING PREGNANCY IN THIRD TRIMESTER: ICD-10-CM

## 2018-05-29 DIAGNOSIS — O24.414 INSULIN CONTROLLED GESTATIONAL DIABETES MELLITUS (GDM) IN THIRD TRIMESTER: ICD-10-CM

## 2018-05-29 PROCEDURE — 76818 FETAL BIOPHYS PROFILE W/NST: CPT | Performed by: OBSTETRICS & GYNECOLOGY

## 2018-05-29 PROCEDURE — 59025 FETAL NON-STRESS TEST: CPT | Performed by: NURSE PRACTITIONER

## 2018-06-01 ENCOUNTER — HOSPITAL ENCOUNTER (OUTPATIENT)
Age: 33
Setting detail: SPECIMEN
Discharge: HOME | End: 2018-06-01
Payer: COMMERCIAL

## 2018-06-01 ENCOUNTER — ROUTINE PRENATAL (OUTPATIENT)
Dept: OBGYN CLINIC | Age: 33
End: 2018-06-01
Payer: COMMERCIAL

## 2018-06-01 VITALS — BODY MASS INDEX: 49.44 KG/M2 | WEIGHT: 244.8 LBS | SYSTOLIC BLOOD PRESSURE: 102 MMHG | DIASTOLIC BLOOD PRESSURE: 60 MMHG

## 2018-06-01 DIAGNOSIS — Z3A.36 36 WEEKS GESTATION OF PREGNANCY: Primary | ICD-10-CM

## 2018-06-01 DIAGNOSIS — O24.414 INSULIN CONTROLLED GESTATIONAL DIABETES MELLITUS (GDM) IN THIRD TRIMESTER: ICD-10-CM

## 2018-06-01 DIAGNOSIS — D68.2 FACTOR V DEFICIENCY (HCC): ICD-10-CM

## 2018-06-01 DIAGNOSIS — O99.213 OBESITY AFFECTING PREGNANCY IN THIRD TRIMESTER: ICD-10-CM

## 2018-06-01 DIAGNOSIS — Z3A.36 36 WEEKS GESTATION OF PREGNANCY: ICD-10-CM

## 2018-06-01 DIAGNOSIS — O09.93 HIGH-RISK PREGNANCY IN THIRD TRIMESTER: ICD-10-CM

## 2018-06-01 LAB — GROUP B STREP CULTURE: POSITIVE

## 2018-06-01 PROCEDURE — 59025 FETAL NON-STRESS TEST: CPT | Performed by: NURSE PRACTITIONER

## 2018-06-01 PROCEDURE — 0502F SUBSEQUENT PRENATAL CARE: CPT | Performed by: NURSE PRACTITIONER

## 2018-06-02 LAB
CULTURE: ABNORMAL
CULTURE: ABNORMAL
Lab: ABNORMAL
SPECIMEN DESCRIPTION: ABNORMAL
STATUS: ABNORMAL

## 2018-06-05 ENCOUNTER — PROCEDURE VISIT (OUTPATIENT)
Dept: OBGYN CLINIC | Age: 33
End: 2018-06-05
Payer: COMMERCIAL

## 2018-06-05 ENCOUNTER — ROUTINE PRENATAL (OUTPATIENT)
Dept: OBGYN CLINIC | Age: 33
End: 2018-06-05
Payer: COMMERCIAL

## 2018-06-05 VITALS — DIASTOLIC BLOOD PRESSURE: 74 MMHG | BODY MASS INDEX: 51.42 KG/M2 | SYSTOLIC BLOOD PRESSURE: 108 MMHG | WEIGHT: 254.6 LBS

## 2018-06-05 DIAGNOSIS — Z3A.36 36 WEEKS GESTATION OF PREGNANCY: Primary | ICD-10-CM

## 2018-06-05 DIAGNOSIS — O99.213 OBESITY AFFECTING PREGNANCY IN THIRD TRIMESTER: ICD-10-CM

## 2018-06-05 DIAGNOSIS — O24.414 INSULIN CONTROLLED GESTATIONAL DIABETES MELLITUS (GDM) IN THIRD TRIMESTER: ICD-10-CM

## 2018-06-05 DIAGNOSIS — O09.93 HRP (HIGH RISK PREGNANCY), THIRD TRIMESTER: ICD-10-CM

## 2018-06-05 PROCEDURE — 76818 FETAL BIOPHYS PROFILE W/NST: CPT | Performed by: OBSTETRICS & GYNECOLOGY

## 2018-06-05 PROCEDURE — 59025 FETAL NON-STRESS TEST: CPT | Performed by: OBSTETRICS & GYNECOLOGY

## 2018-06-06 ENCOUNTER — TELEPHONE (OUTPATIENT)
Dept: OBGYN CLINIC | Age: 33
End: 2018-06-06

## 2018-06-08 ENCOUNTER — ROUTINE PRENATAL (OUTPATIENT)
Dept: OBGYN CLINIC | Age: 33
End: 2018-06-08
Payer: COMMERCIAL

## 2018-06-08 VITALS — DIASTOLIC BLOOD PRESSURE: 80 MMHG | BODY MASS INDEX: 51.5 KG/M2 | WEIGHT: 255 LBS | SYSTOLIC BLOOD PRESSURE: 110 MMHG

## 2018-06-08 DIAGNOSIS — O99.213 OBESITY AFFECTING PREGNANCY IN THIRD TRIMESTER: ICD-10-CM

## 2018-06-08 DIAGNOSIS — O09.93 HRP (HIGH RISK PREGNANCY), THIRD TRIMESTER: Primary | ICD-10-CM

## 2018-06-08 DIAGNOSIS — Z3A.37 37 WEEKS GESTATION OF PREGNANCY: ICD-10-CM

## 2018-06-08 DIAGNOSIS — O24.414 INSULIN CONTROLLED GESTATIONAL DIABETES MELLITUS (GDM) IN THIRD TRIMESTER: ICD-10-CM

## 2018-06-08 PROCEDURE — 0502F SUBSEQUENT PRENATAL CARE: CPT | Performed by: OBSTETRICS & GYNECOLOGY

## 2018-06-08 PROCEDURE — 59025 FETAL NON-STRESS TEST: CPT | Performed by: OBSTETRICS & GYNECOLOGY

## 2018-06-12 ENCOUNTER — PROCEDURE VISIT (OUTPATIENT)
Dept: OBGYN CLINIC | Age: 33
End: 2018-06-12
Payer: COMMERCIAL

## 2018-06-12 ENCOUNTER — ROUTINE PRENATAL (OUTPATIENT)
Dept: OBGYN CLINIC | Age: 33
End: 2018-06-12
Payer: COMMERCIAL

## 2018-06-12 VITALS — DIASTOLIC BLOOD PRESSURE: 66 MMHG | SYSTOLIC BLOOD PRESSURE: 114 MMHG | BODY MASS INDEX: 50.7 KG/M2 | WEIGHT: 251 LBS

## 2018-06-12 DIAGNOSIS — Z3A.37 37 WEEKS GESTATION OF PREGNANCY: Primary | ICD-10-CM

## 2018-06-12 DIAGNOSIS — O24.414 INSULIN CONTROLLED GESTATIONAL DIABETES MELLITUS (GDM) IN THIRD TRIMESTER: ICD-10-CM

## 2018-06-12 DIAGNOSIS — O09.93 HRP (HIGH RISK PREGNANCY), THIRD TRIMESTER: ICD-10-CM

## 2018-06-12 PROCEDURE — 59025 FETAL NON-STRESS TEST: CPT | Performed by: NURSE PRACTITIONER

## 2018-06-12 PROCEDURE — 76818 FETAL BIOPHYS PROFILE W/NST: CPT | Performed by: OBSTETRICS & GYNECOLOGY

## 2018-06-15 ENCOUNTER — ROUTINE PRENATAL (OUTPATIENT)
Dept: OBGYN CLINIC | Age: 33
End: 2018-06-15
Payer: COMMERCIAL

## 2018-06-15 VITALS — SYSTOLIC BLOOD PRESSURE: 122 MMHG | DIASTOLIC BLOOD PRESSURE: 72 MMHG | BODY MASS INDEX: 50.49 KG/M2 | WEIGHT: 250 LBS

## 2018-06-15 DIAGNOSIS — O09.93 HRP (HIGH RISK PREGNANCY), THIRD TRIMESTER: Primary | ICD-10-CM

## 2018-06-15 DIAGNOSIS — Z3A.38 38 WEEKS GESTATION OF PREGNANCY: ICD-10-CM

## 2018-06-15 DIAGNOSIS — O24.419 GESTATIONAL DIABETES MELLITUS (GDM) IN THIRD TRIMESTER, GESTATIONAL DIABETES METHOD OF CONTROL UNSPECIFIED: ICD-10-CM

## 2018-06-15 DIAGNOSIS — O99.213 OBESITY AFFECTING PREGNANCY IN THIRD TRIMESTER: ICD-10-CM

## 2018-06-15 PROCEDURE — 59025 FETAL NON-STRESS TEST: CPT | Performed by: NURSE PRACTITIONER

## 2018-06-15 PROCEDURE — 0502F SUBSEQUENT PRENATAL CARE: CPT | Performed by: NURSE PRACTITIONER

## 2018-06-21 ENCOUNTER — APPOINTMENT (OUTPATIENT)
Dept: LABOR AND DELIVERY | Age: 33
End: 2018-06-21
Payer: COMMERCIAL

## 2018-06-21 ENCOUNTER — HOSPITAL ENCOUNTER (INPATIENT)
Age: 33
LOS: 3 days | Discharge: HOME | End: 2018-06-24
Attending: OBSTETRICS & GYNECOLOGY | Admitting: OBSTETRICS & GYNECOLOGY
Payer: COMMERCIAL

## 2018-06-21 DIAGNOSIS — Z98.891 S/P CESAREAN SECTION: Primary | ICD-10-CM

## 2018-06-21 LAB
ABO/RH: NORMAL
ABSOLUTE EOS #: 0.07 K/UL (ref 0–0.44)
ABSOLUTE IMMATURE GRANULOCYTE: 0.08 K/UL (ref 0–0.3)
ABSOLUTE LYMPH #: 2.12 K/UL (ref 1.1–3.7)
ABSOLUTE MONO #: 1 K/UL (ref 0.1–1.2)
AMPHETAMINE SCREEN URINE: NEGATIVE
ANTIBODY SCREEN: NEGATIVE
ARM BAND NUMBER: NORMAL
BARBITURATE SCREEN URINE: NEGATIVE
BASOPHILS # BLD: 0 % (ref 0–2)
BASOPHILS ABSOLUTE: <0.03 K/UL (ref 0–0.2)
BENZODIAZEPINE SCREEN, URINE: NEGATIVE
BILIRUBIN URINE: NEGATIVE
BUPRENORPHINE URINE: NORMAL
CANNABINOID SCREEN URINE: NEGATIVE
COCAINE METABOLITE, URINE: NEGATIVE
COLOR: YELLOW
COMMENT UA: NORMAL
DIFFERENTIAL TYPE: ABNORMAL
EOSINOPHILS RELATIVE PERCENT: 1 % (ref 1–4)
EXPIRATION DATE: NORMAL
GLUCOSE BLD-MCNC: 77 MG/DL
GLUCOSE URINE: NEGATIVE
HCT VFR BLD CALC: 37.1 % (ref 36.3–47.1)
HEMOGLOBIN: 12.4 G/DL (ref 11.9–15.1)
IMMATURE GRANULOCYTES: 1 %
KETONES, URINE: NEGATIVE
LEUKOCYTE ESTERASE, URINE: NEGATIVE
LYMPHOCYTES # BLD: 14 % (ref 24–43)
MCH RBC QN AUTO: 29.7 PG (ref 25.2–33.5)
MCHC RBC AUTO-ENTMCNC: 33.4 G/DL (ref 28.4–34.8)
MCV RBC AUTO: 88.8 FL (ref 82.6–102.9)
MDMA URINE: NORMAL
METHADONE SCREEN, URINE: NEGATIVE
METHAMPHETAMINE, URINE: NORMAL
MONOCYTES # BLD: 7 % (ref 3–12)
NITRITE, URINE: NEGATIVE
NRBC AUTOMATED: 0 PER 100 WBC
OPIATES, URINE: NEGATIVE
OXYCODONE SCREEN URINE: NEGATIVE
PDW BLD-RTO: 14.6 % (ref 11.8–14.4)
PH UA: 7 (ref 5–8)
PHENCYCLIDINE, URINE: NEGATIVE
PLATELET # BLD: 326 K/UL (ref 138–453)
PLATELET ESTIMATE: ABNORMAL
PMV BLD AUTO: 10.4 FL (ref 8.1–13.5)
PROPOXYPHENE, URINE: NORMAL
PROTEIN UA: NEGATIVE
RBC # BLD: 4.18 M/UL (ref 3.95–5.11)
RBC # BLD: ABNORMAL 10*6/UL
SEG NEUTROPHILS: 77 % (ref 36–65)
SEGMENTED NEUTROPHILS ABSOLUTE COUNT: 11.4 K/UL (ref 1.5–8.1)
SPECIFIC GRAVITY UA: 1.02 (ref 1–1.03)
T. PALLIDUM, IGG: NONREACTIVE
TEST INFORMATION: NORMAL
TRICYCLIC ANTIDEPRESSANTS, UR: NORMAL
TURBIDITY: CLEAR
URINE HGB: NEGATIVE
UROBILINOGEN, URINE: NORMAL
WBC # BLD: 14.7 K/UL (ref 3.5–11.3)
WBC # BLD: ABNORMAL 10*3/UL

## 2018-06-21 PROCEDURE — 2580000003 HC RX 258: Performed by: STUDENT IN AN ORGANIZED HEALTH CARE EDUCATION/TRAINING PROGRAM

## 2018-06-21 PROCEDURE — 85025 COMPLETE CBC W/AUTO DIFF WBC: CPT

## 2018-06-21 PROCEDURE — 82570 ASSAY OF URINE CREATININE: CPT

## 2018-06-21 PROCEDURE — 86901 BLOOD TYPING SEROLOGIC RH(D): CPT

## 2018-06-21 PROCEDURE — 81003 URINALYSIS AUTO W/O SCOPE: CPT

## 2018-06-21 PROCEDURE — 86850 RBC ANTIBODY SCREEN: CPT

## 2018-06-21 PROCEDURE — 6360000002 HC RX W HCPCS

## 2018-06-21 PROCEDURE — 6370000000 HC RX 637 (ALT 250 FOR IP): Performed by: STUDENT IN AN ORGANIZED HEALTH CARE EDUCATION/TRAINING PROGRAM

## 2018-06-21 PROCEDURE — 86900 BLOOD TYPING SEROLOGIC ABO: CPT

## 2018-06-21 PROCEDURE — 1220000000 HC SEMI PRIVATE OB R&B

## 2018-06-21 PROCEDURE — 2500000003 HC RX 250 WO HCPCS

## 2018-06-21 PROCEDURE — 3E0P7VZ INTRODUCTION OF HORMONE INTO FEMALE REPRODUCTIVE, VIA NATURAL OR ARTIFICIAL OPENING: ICD-10-PCS | Performed by: OBSTETRICS & GYNECOLOGY

## 2018-06-21 PROCEDURE — 59200 INSERT CERVICAL DILATOR: CPT

## 2018-06-21 PROCEDURE — 86780 TREPONEMA PALLIDUM: CPT

## 2018-06-21 PROCEDURE — 84156 ASSAY OF PROTEIN URINE: CPT

## 2018-06-21 PROCEDURE — 80307 DRUG TEST PRSMV CHEM ANLYZR: CPT

## 2018-06-21 RX ORDER — ACETAMINOPHEN 500 MG
1000 TABLET ORAL EVERY 6 HOURS PRN
Status: DISCONTINUED | OUTPATIENT
Start: 2018-06-21 | End: 2018-06-22

## 2018-06-21 RX ORDER — GLUCAGON 1 MG/ML
1 KIT INJECTION PRN
Status: DISCONTINUED | OUTPATIENT
Start: 2018-06-21 | End: 2018-06-22

## 2018-06-21 RX ORDER — DEXTROSE MONOHYDRATE 50 MG/ML
100 INJECTION, SOLUTION INTRAVENOUS PRN
Status: DISCONTINUED | OUTPATIENT
Start: 2018-06-21 | End: 2018-06-22

## 2018-06-21 RX ORDER — SODIUM CHLORIDE 0.9 % (FLUSH) 0.9 %
10 SYRINGE (ML) INJECTION EVERY 12 HOURS SCHEDULED
Status: DISCONTINUED | OUTPATIENT
Start: 2018-06-21 | End: 2018-06-22

## 2018-06-21 RX ORDER — SODIUM CHLORIDE 9 MG/ML
INJECTION, SOLUTION INTRAVENOUS CONTINUOUS
Status: DISCONTINUED | OUTPATIENT
Start: 2018-06-21 | End: 2018-06-22

## 2018-06-21 RX ORDER — ONDANSETRON 2 MG/ML
4 INJECTION INTRAMUSCULAR; INTRAVENOUS EVERY 6 HOURS PRN
Status: DISCONTINUED | OUTPATIENT
Start: 2018-06-21 | End: 2018-06-22

## 2018-06-21 RX ORDER — SODIUM CHLORIDE 0.9 % (FLUSH) 0.9 %
10 SYRINGE (ML) INJECTION PRN
Status: DISCONTINUED | OUTPATIENT
Start: 2018-06-21 | End: 2018-06-22

## 2018-06-21 RX ORDER — DIPHENHYDRAMINE HCL 25 MG
50 TABLET ORAL ONCE
Status: COMPLETED | OUTPATIENT
Start: 2018-06-22 | End: 2018-06-21

## 2018-06-21 RX ORDER — NICOTINE POLACRILEX 4 MG
15 LOZENGE BUCCAL PRN
Status: DISCONTINUED | OUTPATIENT
Start: 2018-06-21 | End: 2018-06-22

## 2018-06-21 RX ORDER — DEXTROSE MONOHYDRATE 25 G/50ML
12.5 INJECTION, SOLUTION INTRAVENOUS PRN
Status: DISCONTINUED | OUTPATIENT
Start: 2018-06-21 | End: 2018-06-22

## 2018-06-21 RX ADMIN — ACETAMINOPHEN 1000 MG: 500 TABLET ORAL at 23:53

## 2018-06-21 RX ADMIN — DIPHENHYDRAMINE HCL 50 MG: 25 TABLET ORAL at 23:53

## 2018-06-21 RX ADMIN — DINOPROSTONE 10 MG: 10 INSERT VAGINAL at 21:29

## 2018-06-21 RX ADMIN — SODIUM CHLORIDE: 9 INJECTION, SOLUTION INTRAVENOUS at 21:29

## 2018-06-21 ASSESSMENT — PAIN SCALES - GENERAL
PAINLEVEL_OUTOF10: 3
PAINLEVEL_OUTOF10: 0

## 2018-06-22 ENCOUNTER — ANESTHESIA (OUTPATIENT)
Dept: LABOR AND DELIVERY | Age: 33
End: 2018-06-22
Payer: COMMERCIAL

## 2018-06-22 ENCOUNTER — ANESTHESIA EVENT (OUTPATIENT)
Dept: LABOR AND DELIVERY | Age: 33
End: 2018-06-22
Payer: COMMERCIAL

## 2018-06-22 VITALS — DIASTOLIC BLOOD PRESSURE: 91 MMHG | SYSTOLIC BLOOD PRESSURE: 156 MMHG | OXYGEN SATURATION: 97 %

## 2018-06-22 PROBLEM — Z98.891 S/P CESAREAN SECTION: Status: ACTIVE | Noted: 2018-06-22

## 2018-06-22 PROBLEM — D64.9 ANEMIA: Status: ACTIVE | Noted: 2018-06-22

## 2018-06-22 LAB
ABSOLUTE EOS #: 0 K/UL (ref 0–0.4)
ABSOLUTE EOS #: 0.07 K/UL (ref 0–0.44)
ABSOLUTE IMMATURE GRANULOCYTE: 0 K/UL (ref 0–0.3)
ABSOLUTE IMMATURE GRANULOCYTE: 0.08 K/UL (ref 0–0.3)
ABSOLUTE LYMPH #: 1.79 K/UL (ref 1–4.8)
ABSOLUTE LYMPH #: 1.86 K/UL (ref 1.1–3.7)
ABSOLUTE MONO #: 0.97 K/UL (ref 0.1–1.2)
ABSOLUTE MONO #: 1.02 K/UL (ref 0.1–0.8)
ALBUMIN SERPL-MCNC: 2.7 G/DL (ref 3.5–5.2)
ALBUMIN/GLOBULIN RATIO: 0.8 (ref 1–2.5)
ALP BLD-CCNC: 159 U/L (ref 35–104)
ALT SERPL-CCNC: 7 U/L (ref 5–33)
ANION GAP SERPL CALCULATED.3IONS-SCNC: 14 MMOL/L (ref 9–17)
AST SERPL-CCNC: 15 U/L
BASOPHILS # BLD: 0 % (ref 0–2)
BASOPHILS # BLD: 0 % (ref 0–2)
BASOPHILS ABSOLUTE: 0 K/UL (ref 0–0.2)
BASOPHILS ABSOLUTE: <0.03 K/UL (ref 0–0.2)
BILIRUB SERPL-MCNC: 0.19 MG/DL (ref 0.3–1.2)
BUN BLDV-MCNC: 6 MG/DL (ref 6–20)
BUN/CREAT BLD: ABNORMAL (ref 9–20)
CALCIUM SERPL-MCNC: 8.1 MG/DL (ref 8.6–10.4)
CHLORIDE BLD-SCNC: 103 MMOL/L (ref 98–107)
CO2: 18 MMOL/L (ref 20–31)
CREAT SERPL-MCNC: 0.51 MG/DL (ref 0.5–0.9)
CREATININE URINE: 84.2 MG/DL (ref 28–217)
DIFFERENTIAL TYPE: ABNORMAL
DIFFERENTIAL TYPE: ABNORMAL
EOSINOPHILS RELATIVE PERCENT: 0 % (ref 1–4)
EOSINOPHILS RELATIVE PERCENT: 0 % (ref 1–4)
GFR AFRICAN AMERICAN: >60 ML/MIN
GFR NON-AFRICAN AMERICAN: >60 ML/MIN
GFR SERPL CREATININE-BSD FRML MDRD: ABNORMAL ML/MIN/{1.73_M2}
GFR SERPL CREATININE-BSD FRML MDRD: ABNORMAL ML/MIN/{1.73_M2}
GLUCOSE BLD-MCNC: 114 MG/DL (ref 65–105)
GLUCOSE BLD-MCNC: 117 MG/DL (ref 70–99)
GLUCOSE BLD-MCNC: 72 MG/DL
GLUCOSE BLD-MCNC: 80 MG/DL (ref 65–105)
GLUCOSE BLD-MCNC: 98 MG/DL (ref 65–105)
HCT VFR BLD CALC: 29.8 % (ref 36.3–47.1)
HCT VFR BLD CALC: 38.2 % (ref 36.3–47.1)
HEMOGLOBIN: 12.2 G/DL (ref 11.9–15.1)
HEMOGLOBIN: 9.7 G/DL (ref 11.9–15.1)
IMMATURE GRANULOCYTES: 0 %
IMMATURE GRANULOCYTES: 1 %
LYMPHOCYTES # BLD: 11 % (ref 24–43)
LYMPHOCYTES # BLD: 7 % (ref 24–44)
MCH RBC QN AUTO: 29.2 PG (ref 25.2–33.5)
MCH RBC QN AUTO: 29.5 PG (ref 25.2–33.5)
MCHC RBC AUTO-ENTMCNC: 31.9 G/DL (ref 28.4–34.8)
MCHC RBC AUTO-ENTMCNC: 32.6 G/DL (ref 28.4–34.8)
MCV RBC AUTO: 89.8 FL (ref 82.6–102.9)
MCV RBC AUTO: 92.5 FL (ref 82.6–102.9)
MONOCYTES # BLD: 4 % (ref 1–7)
MONOCYTES # BLD: 6 % (ref 3–12)
MORPHOLOGY: ABNORMAL
NRBC AUTOMATED: 0 PER 100 WBC
NRBC AUTOMATED: 0 PER 100 WBC
PDW BLD-RTO: 14.6 % (ref 11.8–14.4)
PDW BLD-RTO: 14.9 % (ref 11.8–14.4)
PLATELET # BLD: 280 K/UL (ref 138–453)
PLATELET # BLD: 303 K/UL (ref 138–453)
PLATELET ESTIMATE: ABNORMAL
PLATELET ESTIMATE: ABNORMAL
PMV BLD AUTO: 10.2 FL (ref 8.1–13.5)
PMV BLD AUTO: 10.3 FL (ref 8.1–13.5)
POTASSIUM SERPL-SCNC: 3.9 MMOL/L (ref 3.7–5.3)
RBC # BLD: 3.32 M/UL (ref 3.95–5.11)
RBC # BLD: 4.13 M/UL (ref 3.95–5.11)
RBC # BLD: ABNORMAL 10*6/UL
RBC # BLD: ABNORMAL 10*6/UL
SEG NEUTROPHILS: 82 % (ref 36–65)
SEG NEUTROPHILS: 89 % (ref 36–66)
SEGMENTED NEUTROPHILS ABSOLUTE COUNT: 13.47 K/UL (ref 1.5–8.1)
SEGMENTED NEUTROPHILS ABSOLUTE COUNT: 22.79 K/UL (ref 1.8–7.7)
SODIUM BLD-SCNC: 135 MMOL/L (ref 135–144)
TOTAL PROTEIN, URINE: 16 MG/DL
TOTAL PROTEIN: 6 G/DL (ref 6.4–8.3)
URINE TOTAL PROTEIN CREATININE RATIO: 0.19 (ref 0–0.2)
WBC # BLD: 16.5 K/UL (ref 3.5–11.3)
WBC # BLD: 25.6 K/UL (ref 3.5–11.3)
WBC # BLD: ABNORMAL 10*3/UL
WBC # BLD: ABNORMAL 10*3/UL

## 2018-06-22 PROCEDURE — 82947 ASSAY GLUCOSE BLOOD QUANT: CPT

## 2018-06-22 PROCEDURE — 2500000003 HC RX 250 WO HCPCS: Performed by: NURSE ANESTHETIST, CERTIFIED REGISTERED

## 2018-06-22 PROCEDURE — 51701 INSERT BLADDER CATHETER: CPT

## 2018-06-22 PROCEDURE — 3609079900 HC CESAREAN SECTION: Performed by: OBSTETRICS & GYNECOLOGY

## 2018-06-22 PROCEDURE — 3700000025 ANESTHESIA EPIDURAL BLOCK: Performed by: ANESTHESIOLOGY

## 2018-06-22 PROCEDURE — 2580000003 HC RX 258: Performed by: STUDENT IN AN ORGANIZED HEALTH CARE EDUCATION/TRAINING PROGRAM

## 2018-06-22 PROCEDURE — 10H07YZ INSERTION OF OTHER DEVICE INTO PRODUCTS OF CONCEPTION, VIA NATURAL OR ARTIFICIAL OPENING: ICD-10-PCS | Performed by: OBSTETRICS & GYNECOLOGY

## 2018-06-22 PROCEDURE — 36415 COLL VENOUS BLD VENIPUNCTURE: CPT

## 2018-06-22 PROCEDURE — 6360000002 HC RX W HCPCS: Performed by: NURSE ANESTHETIST, CERTIFIED REGISTERED

## 2018-06-22 PROCEDURE — 1220000000 HC SEMI PRIVATE OB R&B

## 2018-06-22 PROCEDURE — 6360000002 HC RX W HCPCS: Performed by: STUDENT IN AN ORGANIZED HEALTH CARE EDUCATION/TRAINING PROGRAM

## 2018-06-22 PROCEDURE — 3700000001 HC ADD 15 MINUTES (ANESTHESIA): Performed by: OBSTETRICS & GYNECOLOGY

## 2018-06-22 PROCEDURE — 3700000000 HC ANESTHESIA ATTENDED CARE: Performed by: OBSTETRICS & GYNECOLOGY

## 2018-06-22 PROCEDURE — 6360000002 HC RX W HCPCS: Performed by: OBSTETRICS & GYNECOLOGY

## 2018-06-22 PROCEDURE — 7100000000 HC PACU RECOVERY - FIRST 15 MIN: Performed by: OBSTETRICS & GYNECOLOGY

## 2018-06-22 PROCEDURE — 7100000001 HC PACU RECOVERY - ADDTL 15 MIN: Performed by: OBSTETRICS & GYNECOLOGY

## 2018-06-22 PROCEDURE — 88307 TISSUE EXAM BY PATHOLOGIST: CPT

## 2018-06-22 PROCEDURE — 6370000000 HC RX 637 (ALT 250 FOR IP)

## 2018-06-22 PROCEDURE — 80053 COMPREHEN METABOLIC PANEL: CPT

## 2018-06-22 PROCEDURE — 2580000003 HC RX 258: Performed by: NURSE ANESTHETIST, CERTIFIED REGISTERED

## 2018-06-22 PROCEDURE — 10907ZC DRAINAGE OF AMNIOTIC FLUID, THERAPEUTIC FROM PRODUCTS OF CONCEPTION, VIA NATURAL OR ARTIFICIAL OPENING: ICD-10-PCS | Performed by: OBSTETRICS & GYNECOLOGY

## 2018-06-22 PROCEDURE — 85025 COMPLETE CBC W/AUTO DIFF WBC: CPT

## 2018-06-22 PROCEDURE — 59510 CESAREAN DELIVERY: CPT | Performed by: OBSTETRICS & GYNECOLOGY

## 2018-06-22 RX ORDER — OXYCODONE HYDROCHLORIDE AND ACETAMINOPHEN 5; 325 MG/1; MG/1
2 TABLET ORAL EVERY 4 HOURS PRN
Status: DISCONTINUED | OUTPATIENT
Start: 2018-06-22 | End: 2018-06-24 | Stop reason: HOSPADM

## 2018-06-22 RX ORDER — SODIUM CHLORIDE 0.9 % (FLUSH) 0.9 %
10 SYRINGE (ML) INJECTION PRN
Status: DISCONTINUED | OUTPATIENT
Start: 2018-06-22 | End: 2018-06-24 | Stop reason: HOSPADM

## 2018-06-22 RX ORDER — MORPHINE SULFATE 1 MG/ML
INJECTION, SOLUTION EPIDURAL; INTRATHECAL; INTRAVENOUS PRN
Status: DISCONTINUED | OUTPATIENT
Start: 2018-06-22 | End: 2018-06-22 | Stop reason: SDUPTHER

## 2018-06-22 RX ORDER — LIDOCAINE HYDROCHLORIDE AND EPINEPHRINE 15; 5 MG/ML; UG/ML
INJECTION, SOLUTION EPIDURAL PRN
Status: DISCONTINUED | OUTPATIENT
Start: 2018-06-22 | End: 2018-06-22 | Stop reason: SDUPTHER

## 2018-06-22 RX ORDER — SODIUM CHLORIDE, SODIUM LACTATE, POTASSIUM CHLORIDE, CALCIUM CHLORIDE 600; 310; 30; 20 MG/100ML; MG/100ML; MG/100ML; MG/100ML
INJECTION, SOLUTION INTRAVENOUS CONTINUOUS
Status: DISCONTINUED | OUTPATIENT
Start: 2018-06-22 | End: 2018-06-22

## 2018-06-22 RX ORDER — SODIUM CHLORIDE 0.9 % (FLUSH) 0.9 %
10 SYRINGE (ML) INJECTION EVERY 12 HOURS SCHEDULED
Status: DISCONTINUED | OUTPATIENT
Start: 2018-06-22 | End: 2018-06-22

## 2018-06-22 RX ORDER — LANOLIN 100 %
OINTMENT (GRAM) TOPICAL
Status: DISCONTINUED | OUTPATIENT
Start: 2018-06-22 | End: 2018-06-24 | Stop reason: HOSPADM

## 2018-06-22 RX ORDER — KETAMINE HYDROCHLORIDE 100 MG/ML
INJECTION, SOLUTION INTRAMUSCULAR; INTRAVENOUS PRN
Status: DISCONTINUED | OUTPATIENT
Start: 2018-06-22 | End: 2018-06-22 | Stop reason: SDUPTHER

## 2018-06-22 RX ORDER — FENTANYL CITRATE 50 UG/ML
INJECTION, SOLUTION INTRAMUSCULAR; INTRAVENOUS PRN
Status: DISCONTINUED | OUTPATIENT
Start: 2018-06-22 | End: 2018-06-22 | Stop reason: SDUPTHER

## 2018-06-22 RX ORDER — SODIUM CHLORIDE 0.9 % (FLUSH) 0.9 %
10 SYRINGE (ML) INJECTION EVERY 12 HOURS SCHEDULED
Status: DISCONTINUED | OUTPATIENT
Start: 2018-06-22 | End: 2018-06-24 | Stop reason: HOSPADM

## 2018-06-22 RX ORDER — DIPHENHYDRAMINE HYDROCHLORIDE 50 MG/ML
25 INJECTION INTRAMUSCULAR; INTRAVENOUS EVERY 6 HOURS PRN
Status: DISCONTINUED | OUTPATIENT
Start: 2018-06-22 | End: 2018-06-24 | Stop reason: HOSPADM

## 2018-06-22 RX ORDER — SODIUM CHLORIDE, SODIUM LACTATE, POTASSIUM CHLORIDE, CALCIUM CHLORIDE 600; 310; 30; 20 MG/100ML; MG/100ML; MG/100ML; MG/100ML
INJECTION, SOLUTION INTRAVENOUS CONTINUOUS PRN
Status: DISCONTINUED | OUTPATIENT
Start: 2018-06-22 | End: 2018-06-22 | Stop reason: SDUPTHER

## 2018-06-22 RX ORDER — MORPHINE SULFATE 4 MG/ML
INJECTION, SOLUTION INTRAMUSCULAR; INTRAVENOUS PRN
Status: DISCONTINUED | OUTPATIENT
Start: 2018-06-22 | End: 2018-06-22 | Stop reason: SDUPTHER

## 2018-06-22 RX ORDER — PROMETHAZINE HYDROCHLORIDE 25 MG/ML
25 INJECTION, SOLUTION INTRAMUSCULAR; INTRAVENOUS ONCE
Status: COMPLETED | OUTPATIENT
Start: 2018-06-22 | End: 2018-06-22

## 2018-06-22 RX ORDER — FENTANYL CITRATE 50 UG/ML
25 INJECTION, SOLUTION INTRAMUSCULAR; INTRAVENOUS EVERY 10 MIN PRN
Status: DISCONTINUED | OUTPATIENT
Start: 2018-06-22 | End: 2018-06-23

## 2018-06-22 RX ORDER — SODIUM CHLORIDE, SODIUM LACTATE, POTASSIUM CHLORIDE, CALCIUM CHLORIDE 600; 310; 30; 20 MG/100ML; MG/100ML; MG/100ML; MG/100ML
INJECTION, SOLUTION INTRAVENOUS CONTINUOUS
Status: ACTIVE | OUTPATIENT
Start: 2018-06-22 | End: 2018-06-23

## 2018-06-22 RX ORDER — ROPIVACAINE HYDROCHLORIDE 2 MG/ML
INJECTION, SOLUTION EPIDURAL; INFILTRATION; PERINEURAL
Status: COMPLETED
Start: 2018-06-22 | End: 2018-06-22

## 2018-06-22 RX ORDER — NALOXONE HYDROCHLORIDE 0.4 MG/ML
0.4 INJECTION, SOLUTION INTRAMUSCULAR; INTRAVENOUS; SUBCUTANEOUS PRN
Status: CANCELLED | OUTPATIENT
Start: 2018-06-22

## 2018-06-22 RX ORDER — SODIUM CHLORIDE 0.9 % (FLUSH) 0.9 %
10 SYRINGE (ML) INJECTION PRN
Status: DISCONTINUED | OUTPATIENT
Start: 2018-06-22 | End: 2018-06-22

## 2018-06-22 RX ORDER — MIDAZOLAM HYDROCHLORIDE 1 MG/ML
INJECTION INTRAMUSCULAR; INTRAVENOUS PRN
Status: DISCONTINUED | OUTPATIENT
Start: 2018-06-22 | End: 2018-06-22 | Stop reason: SDUPTHER

## 2018-06-22 RX ORDER — OXYCODONE HYDROCHLORIDE AND ACETAMINOPHEN 5; 325 MG/1; MG/1
1 TABLET ORAL EVERY 4 HOURS PRN
Status: DISCONTINUED | OUTPATIENT
Start: 2018-06-22 | End: 2018-06-24 | Stop reason: HOSPADM

## 2018-06-22 RX ORDER — KETOROLAC TROMETHAMINE 30 MG/ML
INJECTION, SOLUTION INTRAMUSCULAR; INTRAVENOUS PRN
Status: DISCONTINUED | OUTPATIENT
Start: 2018-06-22 | End: 2018-06-22 | Stop reason: SDUPTHER

## 2018-06-22 RX ORDER — ONDANSETRON 2 MG/ML
4 INJECTION INTRAMUSCULAR; INTRAVENOUS EVERY 6 HOURS PRN
Status: DISCONTINUED | OUTPATIENT
Start: 2018-06-22 | End: 2018-06-22 | Stop reason: SDUPTHER

## 2018-06-22 RX ORDER — GLYCOPYRROLATE 1 MG/5 ML
SYRINGE (ML) INTRAVENOUS PRN
Status: DISCONTINUED | OUTPATIENT
Start: 2018-06-22 | End: 2018-06-22 | Stop reason: SDUPTHER

## 2018-06-22 RX ORDER — KETOROLAC TROMETHAMINE 30 MG/ML
30 INJECTION, SOLUTION INTRAMUSCULAR; INTRAVENOUS EVERY 6 HOURS
Status: DISPENSED | OUTPATIENT
Start: 2018-06-22 | End: 2018-06-23

## 2018-06-22 RX ORDER — BISACODYL 10 MG
10 SUPPOSITORY, RECTAL RECTAL DAILY PRN
Status: DISCONTINUED | OUTPATIENT
Start: 2018-06-22 | End: 2018-06-24 | Stop reason: HOSPADM

## 2018-06-22 RX ORDER — TRISODIUM CITRATE DIHYDRATE AND CITRIC ACID MONOHYDRATE 500; 334 MG/5ML; MG/5ML
SOLUTION ORAL
Status: COMPLETED
Start: 2018-06-22 | End: 2018-06-22

## 2018-06-22 RX ORDER — DOCUSATE SODIUM 100 MG/1
100 CAPSULE, LIQUID FILLED ORAL 2 TIMES DAILY
Status: DISCONTINUED | OUTPATIENT
Start: 2018-06-22 | End: 2018-06-24 | Stop reason: HOSPADM

## 2018-06-22 RX ORDER — TRISODIUM CITRATE DIHYDRATE AND CITRIC ACID MONOHYDRATE 500; 334 MG/5ML; MG/5ML
30 SOLUTION ORAL ONCE
Status: COMPLETED | OUTPATIENT
Start: 2018-06-22 | End: 2018-06-22

## 2018-06-22 RX ORDER — MORPHINE SULFATE 10 MG/ML
10 INJECTION, SOLUTION INTRAMUSCULAR; INTRAVENOUS ONCE
Status: COMPLETED | OUTPATIENT
Start: 2018-06-22 | End: 2018-06-22

## 2018-06-22 RX ORDER — METHYLERGONOVINE MALEATE 0.2 MG/ML
INJECTION INTRAVENOUS PRN
Status: DISCONTINUED | OUTPATIENT
Start: 2018-06-22 | End: 2018-06-22 | Stop reason: SDUPTHER

## 2018-06-22 RX ORDER — ROPIVACAINE HYDROCHLORIDE 2 MG/ML
INJECTION, SOLUTION EPIDURAL; INFILTRATION; PERINEURAL CONTINUOUS PRN
Status: DISCONTINUED | OUTPATIENT
Start: 2018-06-22 | End: 2018-06-22 | Stop reason: SDUPTHER

## 2018-06-22 RX ORDER — LIDOCAINE HYDROCHLORIDE 20 MG/ML
INJECTION, SOLUTION EPIDURAL; INFILTRATION; INTRACAUDAL; PERINEURAL PRN
Status: DISCONTINUED | OUTPATIENT
Start: 2018-06-22 | End: 2018-06-22 | Stop reason: SDUPTHER

## 2018-06-22 RX ORDER — ACETAMINOPHEN 325 MG/1
650 TABLET ORAL EVERY 4 HOURS PRN
Status: DISCONTINUED | OUTPATIENT
Start: 2018-06-22 | End: 2018-06-24 | Stop reason: HOSPADM

## 2018-06-22 RX ORDER — IBUPROFEN 800 MG/1
800 TABLET ORAL EVERY 8 HOURS PRN
Status: DISCONTINUED | OUTPATIENT
Start: 2018-06-23 | End: 2018-06-23

## 2018-06-22 RX ORDER — ROPIVACAINE HYDROCHLORIDE 2 MG/ML
INJECTION, SOLUTION EPIDURAL; INFILTRATION; PERINEURAL PRN
Status: DISCONTINUED | OUTPATIENT
Start: 2018-06-22 | End: 2018-06-22 | Stop reason: SDUPTHER

## 2018-06-22 RX ORDER — SIMETHICONE 80 MG
80 TABLET,CHEWABLE ORAL EVERY 6 HOURS PRN
Status: DISCONTINUED | OUTPATIENT
Start: 2018-06-22 | End: 2018-06-24 | Stop reason: HOSPADM

## 2018-06-22 RX ORDER — NALOXONE HYDROCHLORIDE 0.4 MG/ML
0.4 INJECTION, SOLUTION INTRAMUSCULAR; INTRAVENOUS; SUBCUTANEOUS PRN
Status: DISCONTINUED | OUTPATIENT
Start: 2018-06-22 | End: 2018-06-24 | Stop reason: HOSPADM

## 2018-06-22 RX ORDER — POLYETHYLENE GLYCOL 3350 17 G/17G
17 POWDER, FOR SOLUTION ORAL DAILY PRN
Status: DISCONTINUED | OUTPATIENT
Start: 2018-06-22 | End: 2018-06-24 | Stop reason: HOSPADM

## 2018-06-22 RX ORDER — CEFAZOLIN SODIUM 1 G/3ML
INJECTION, POWDER, FOR SOLUTION INTRAMUSCULAR; INTRAVENOUS PRN
Status: DISCONTINUED | OUTPATIENT
Start: 2018-06-22 | End: 2018-06-22 | Stop reason: SDUPTHER

## 2018-06-22 RX ADMIN — FENTANYL CITRATE 50 MCG: 50 INJECTION INTRAMUSCULAR; INTRAVENOUS at 18:16

## 2018-06-22 RX ADMIN — SODIUM CHLORIDE: 9 INJECTION, SOLUTION INTRAVENOUS at 12:05

## 2018-06-22 RX ADMIN — MORPHINE SULFATE 3 MG: 1 INJECTION, SOLUTION EPIDURAL; INTRATHECAL; INTRAVENOUS at 18:03

## 2018-06-22 RX ADMIN — LIDOCAINE HYDROCHLORIDE 5 ML: 20 INJECTION, SOLUTION EPIDURAL; INFILTRATION; INTRACAUDAL; PERINEURAL at 16:54

## 2018-06-22 RX ADMIN — Medication 5 MILLION UNITS: at 09:11

## 2018-06-22 RX ADMIN — SODIUM CHLORIDE, POTASSIUM CHLORIDE, SODIUM LACTATE AND CALCIUM CHLORIDE: 600; 310; 30; 20 INJECTION, SOLUTION INTRAVENOUS at 16:36

## 2018-06-22 RX ADMIN — LIDOCAINE HYDROCHLORIDE 5 ML: 20 INJECTION, SOLUTION EPIDURAL; INFILTRATION; INTRACAUDAL; PERINEURAL at 16:41

## 2018-06-22 RX ADMIN — MORPHINE SULFATE 4 MG: 4 INJECTION, SOLUTION INTRAMUSCULAR; INTRAVENOUS at 17:40

## 2018-06-22 RX ADMIN — FENTANYL CITRATE 25 MCG: 50 INJECTION, SOLUTION INTRAMUSCULAR; INTRAVENOUS at 20:04

## 2018-06-22 RX ADMIN — SODIUM CITRATE AND CITRIC ACID MONOHYDRATE 30 ML: 500; 334 SOLUTION ORAL at 16:29

## 2018-06-22 RX ADMIN — Medication 3 G: at 16:40

## 2018-06-22 RX ADMIN — LIDOCAINE HYDROCHLORIDE 5 ML: 20 INJECTION, SOLUTION EPIDURAL; INFILTRATION; INTRACAUDAL; PERINEURAL at 17:35

## 2018-06-22 RX ADMIN — Medication 100 ML/HR: at 17:35

## 2018-06-22 RX ADMIN — KETAMINE HYDROCHLORIDE 30 MG: 100 INJECTION, SOLUTION, CONCENTRATE INTRAMUSCULAR; INTRAVENOUS at 18:02

## 2018-06-22 RX ADMIN — METHYLERGONOVINE MALEATE 200 MCG: 0.2 INJECTION, SOLUTION INTRAMUSCULAR; INTRAVENOUS at 17:38

## 2018-06-22 RX ADMIN — FENTANYL CITRATE 100 MCG: 50 INJECTION INTRAMUSCULAR; INTRAVENOUS at 16:50

## 2018-06-22 RX ADMIN — ROPIVACAINE HYDROCHLORIDE 10 ML/HR: 2 INJECTION, SOLUTION EPIDURAL; INFILTRATION at 10:50

## 2018-06-22 RX ADMIN — SODIUM CHLORIDE: 9 INJECTION, SOLUTION INTRAVENOUS at 10:00

## 2018-06-22 RX ADMIN — CEFAZOLIN 3000 MG: 330 INJECTION, POWDER, FOR SOLUTION INTRAMUSCULAR; INTRAVENOUS at 16:43

## 2018-06-22 RX ADMIN — Medication 0.1 MG: at 18:02

## 2018-06-22 RX ADMIN — FENTANYL CITRATE 50 MCG: 50 INJECTION INTRAMUSCULAR; INTRAVENOUS at 18:20

## 2018-06-22 RX ADMIN — LIDOCAINE HYDROCHLORIDE 5 ML: 20 INJECTION, SOLUTION EPIDURAL; INFILTRATION; INTRACAUDAL; PERINEURAL at 16:36

## 2018-06-22 RX ADMIN — MIDAZOLAM HYDROCHLORIDE 1 MG: 1 INJECTION, SOLUTION INTRAMUSCULAR; INTRAVENOUS at 17:46

## 2018-06-22 RX ADMIN — LIDOCAINE HYDROCHLORIDE,EPINEPHRINE BITARTRATE 3 ML: 15; .005 INJECTION, SOLUTION EPIDURAL; INFILTRATION; INTRACAUDAL; PERINEURAL at 10:42

## 2018-06-22 RX ADMIN — MIDAZOLAM HYDROCHLORIDE 1 MG: 1 INJECTION, SOLUTION INTRAMUSCULAR; INTRAVENOUS at 17:55

## 2018-06-22 RX ADMIN — Medication 2.5 MILLION UNITS: at 13:03

## 2018-06-22 RX ADMIN — TRISODIUM CITRATE DIHYDRATE AND CITRIC ACID MONOHYDRATE 30 ML: 500; 334 SOLUTION ORAL at 16:29

## 2018-06-22 RX ADMIN — ROPIVACAINE HYDROCHLORIDE 5 ML: 2 INJECTION, SOLUTION EPIDURAL; INFILTRATION at 10:50

## 2018-06-22 RX ADMIN — ROPIVACAINE HYDROCHLORIDE 5 ML: 2 INJECTION, SOLUTION EPIDURAL; INFILTRATION at 10:47

## 2018-06-22 RX ADMIN — LIDOCAINE HYDROCHLORIDE 5 ML: 20 INJECTION, SOLUTION EPIDURAL; INFILTRATION; INTRACAUDAL; PERINEURAL at 16:47

## 2018-06-22 RX ADMIN — MORPHINE SULFATE 10 MG: 10 INJECTION INTRAVENOUS at 01:17

## 2018-06-22 RX ADMIN — MORPHINE SULFATE 3 MG: 4 INJECTION, SOLUTION INTRAMUSCULAR; INTRAVENOUS at 17:43

## 2018-06-22 RX ADMIN — KETOROLAC TROMETHAMINE 30 MG: 30 INJECTION, SOLUTION INTRAMUSCULAR at 18:09

## 2018-06-22 RX ADMIN — Medication 1000 ML/HR: at 17:06

## 2018-06-22 RX ADMIN — PROMETHAZINE HYDROCHLORIDE 25 MG: 25 INJECTION INTRAMUSCULAR; INTRAVENOUS at 01:17

## 2018-06-22 ASSESSMENT — PULMONARY FUNCTION TESTS
PIF_VALUE: 1
PIF_VALUE: 0
PIF_VALUE: 1
PIF_VALUE: 0
PIF_VALUE: 1
PIF_VALUE: 0
PIF_VALUE: 1
PIF_VALUE: 0
PIF_VALUE: 1
PIF_VALUE: 0
PIF_VALUE: 1
PIF_VALUE: 1
PIF_VALUE: 0
PIF_VALUE: 1
PIF_VALUE: 0
PIF_VALUE: 1

## 2018-06-22 ASSESSMENT — PAIN SCALES - GENERAL
PAINLEVEL_OUTOF10: 3
PAINLEVEL_OUTOF10: 8

## 2018-06-23 LAB
ABSOLUTE EOS #: 0 K/UL (ref 0–0.44)
ABSOLUTE IMMATURE GRANULOCYTE: 0.19 K/UL (ref 0–0.3)
ABSOLUTE LYMPH #: 2.26 K/UL (ref 1.1–3.7)
ABSOLUTE MONO #: 1.69 K/UL (ref 0.1–1.2)
BASOPHILS # BLD: 0 % (ref 0–2)
BASOPHILS ABSOLUTE: 0 K/UL (ref 0–0.2)
DIFFERENTIAL TYPE: ABNORMAL
EOSINOPHILS RELATIVE PERCENT: 0 % (ref 1–4)
GLUCOSE BLD-MCNC: 75 MG/DL (ref 65–105)
HCT VFR BLD CALC: 27.6 % (ref 36.3–47.1)
HEMOGLOBIN: 8.7 G/DL (ref 11.9–15.1)
IMMATURE GRANULOCYTES: 1 %
LYMPHOCYTES # BLD: 12 % (ref 24–43)
MCH RBC QN AUTO: 29.2 PG (ref 25.2–33.5)
MCHC RBC AUTO-ENTMCNC: 31.5 G/DL (ref 28.4–34.8)
MCV RBC AUTO: 92.6 FL (ref 82.6–102.9)
MONOCYTES # BLD: 9 % (ref 3–12)
MORPHOLOGY: ABNORMAL
NRBC AUTOMATED: 0 PER 100 WBC
PDW BLD-RTO: 14.9 % (ref 11.8–14.4)
PLATELET # BLD: 267 K/UL (ref 138–453)
PLATELET ESTIMATE: ABNORMAL
PMV BLD AUTO: 10.3 FL (ref 8.1–13.5)
RBC # BLD: 2.98 M/UL (ref 3.95–5.11)
RBC # BLD: ABNORMAL 10*6/UL
SEG NEUTROPHILS: 78 % (ref 36–65)
SEGMENTED NEUTROPHILS ABSOLUTE COUNT: 14.66 K/UL (ref 1.5–8.1)
WBC # BLD: 18.8 K/UL (ref 3.5–11.3)
WBC # BLD: ABNORMAL 10*3/UL

## 2018-06-23 PROCEDURE — 6370000000 HC RX 637 (ALT 250 FOR IP): Performed by: STUDENT IN AN ORGANIZED HEALTH CARE EDUCATION/TRAINING PROGRAM

## 2018-06-23 PROCEDURE — 6360000002 HC RX W HCPCS: Performed by: STUDENT IN AN ORGANIZED HEALTH CARE EDUCATION/TRAINING PROGRAM

## 2018-06-23 PROCEDURE — 36415 COLL VENOUS BLD VENIPUNCTURE: CPT

## 2018-06-23 PROCEDURE — S9443 LACTATION CLASS: HCPCS

## 2018-06-23 PROCEDURE — 1220000000 HC SEMI PRIVATE OB R&B

## 2018-06-23 PROCEDURE — 99024 POSTOP FOLLOW-UP VISIT: CPT | Performed by: OBSTETRICS & GYNECOLOGY

## 2018-06-23 PROCEDURE — 94762 N-INVAS EAR/PLS OXIMTRY CONT: CPT

## 2018-06-23 PROCEDURE — 82947 ASSAY GLUCOSE BLOOD QUANT: CPT

## 2018-06-23 PROCEDURE — 2580000003 HC RX 258: Performed by: STUDENT IN AN ORGANIZED HEALTH CARE EDUCATION/TRAINING PROGRAM

## 2018-06-23 PROCEDURE — 85025 COMPLETE CBC W/AUTO DIFF WBC: CPT

## 2018-06-23 RX ORDER — IBUPROFEN 800 MG/1
800 TABLET ORAL EVERY 8 HOURS PRN
Status: DISCONTINUED | OUTPATIENT
Start: 2018-06-23 | End: 2018-06-24 | Stop reason: HOSPADM

## 2018-06-23 RX ADMIN — DOCUSATE SODIUM 100 MG: 100 CAPSULE ORAL at 21:31

## 2018-06-23 RX ADMIN — SIMETHICONE CHEW TAB 80 MG 80 MG: 80 TABLET ORAL at 21:31

## 2018-06-23 RX ADMIN — SODIUM CHLORIDE, POTASSIUM CHLORIDE, SODIUM LACTATE AND CALCIUM CHLORIDE: 600; 310; 30; 20 INJECTION, SOLUTION INTRAVENOUS at 03:09

## 2018-06-23 RX ADMIN — OXYCODONE HYDROCHLORIDE AND ACETAMINOPHEN 2 TABLET: 5; 325 TABLET ORAL at 21:31

## 2018-06-23 RX ADMIN — KETOROLAC TROMETHAMINE 30 MG: 30 INJECTION, SOLUTION INTRAMUSCULAR at 01:09

## 2018-06-23 RX ADMIN — IBUPROFEN 800 MG: 800 TABLET ORAL at 15:32

## 2018-06-23 RX ADMIN — DOCUSATE SODIUM 100 MG: 100 CAPSULE ORAL at 13:20

## 2018-06-23 RX ADMIN — OXYCODONE HYDROCHLORIDE AND ACETAMINOPHEN 2 TABLET: 5; 325 TABLET ORAL at 17:20

## 2018-06-23 RX ADMIN — SIMETHICONE CHEW TAB 80 MG 80 MG: 80 TABLET ORAL at 13:19

## 2018-06-23 RX ADMIN — OXYCODONE HYDROCHLORIDE AND ACETAMINOPHEN 2 TABLET: 5; 325 TABLET ORAL at 13:19

## 2018-06-23 RX ADMIN — KETOROLAC TROMETHAMINE 30 MG: 30 INJECTION, SOLUTION INTRAMUSCULAR at 07:19

## 2018-06-23 ASSESSMENT — PAIN SCALES - GENERAL
PAINLEVEL_OUTOF10: 5
PAINLEVEL_OUTOF10: 8
PAINLEVEL_OUTOF10: 7
PAINLEVEL_OUTOF10: 5
PAINLEVEL_OUTOF10: 5

## 2018-06-23 ASSESSMENT — PAIN DESCRIPTION - DESCRIPTORS
DESCRIPTORS: SHARP

## 2018-06-23 ASSESSMENT — PAIN DESCRIPTION - PAIN TYPE
TYPE: SURGICAL PAIN

## 2018-06-23 ASSESSMENT — PAIN DESCRIPTION - LOCATION
LOCATION: ABDOMEN

## 2018-06-24 VITALS
TEMPERATURE: 97.9 F | BODY MASS INDEX: 51.41 KG/M2 | RESPIRATION RATE: 18 BRPM | WEIGHT: 255 LBS | HEART RATE: 100 BPM | HEIGHT: 59 IN | SYSTOLIC BLOOD PRESSURE: 118 MMHG | OXYGEN SATURATION: 96 % | DIASTOLIC BLOOD PRESSURE: 74 MMHG

## 2018-06-24 LAB
ABSOLUTE EOS #: 0.27 K/UL (ref 0–0.44)
ABSOLUTE IMMATURE GRANULOCYTE: 0.09 K/UL (ref 0–0.3)
ABSOLUTE LYMPH #: 2.32 K/UL (ref 1.1–3.7)
ABSOLUTE MONO #: 1.25 K/UL (ref 0.1–1.2)
BASOPHILS # BLD: 0 % (ref 0–2)
BASOPHILS ABSOLUTE: 0.04 K/UL (ref 0–0.2)
DIFFERENTIAL TYPE: ABNORMAL
EOSINOPHILS RELATIVE PERCENT: 2 % (ref 1–4)
HCT VFR BLD CALC: 24.5 % (ref 36.3–47.1)
HEMOGLOBIN: 7.9 G/DL (ref 11.9–15.1)
IMMATURE GRANULOCYTES: 1 %
LYMPHOCYTES # BLD: 15 % (ref 24–43)
MCH RBC QN AUTO: 29.8 PG (ref 25.2–33.5)
MCHC RBC AUTO-ENTMCNC: 32.2 G/DL (ref 28.4–34.8)
MCV RBC AUTO: 92.5 FL (ref 82.6–102.9)
MONOCYTES # BLD: 8 % (ref 3–12)
NRBC AUTOMATED: 0 PER 100 WBC
PDW BLD-RTO: 15.5 % (ref 11.8–14.4)
PLATELET # BLD: 247 K/UL (ref 138–453)
PLATELET ESTIMATE: ABNORMAL
PMV BLD AUTO: 10.1 FL (ref 8.1–13.5)
RBC # BLD: 2.65 M/UL (ref 3.95–5.11)
RBC # BLD: ABNORMAL 10*6/UL
SEG NEUTROPHILS: 74 % (ref 36–65)
SEGMENTED NEUTROPHILS ABSOLUTE COUNT: 11.14 K/UL (ref 1.5–8.1)
WBC # BLD: 15.1 K/UL (ref 3.5–11.3)
WBC # BLD: ABNORMAL 10*3/UL

## 2018-06-24 PROCEDURE — 99024 POSTOP FOLLOW-UP VISIT: CPT | Performed by: OBSTETRICS & GYNECOLOGY

## 2018-06-24 PROCEDURE — 36415 COLL VENOUS BLD VENIPUNCTURE: CPT

## 2018-06-24 PROCEDURE — 6370000000 HC RX 637 (ALT 250 FOR IP): Performed by: STUDENT IN AN ORGANIZED HEALTH CARE EDUCATION/TRAINING PROGRAM

## 2018-06-24 PROCEDURE — 94762 N-INVAS EAR/PLS OXIMTRY CONT: CPT

## 2018-06-24 PROCEDURE — 85025 COMPLETE CBC W/AUTO DIFF WBC: CPT

## 2018-06-24 RX ORDER — LANOLIN ALCOHOL/MO/W.PET/CERES
325 CREAM (GRAM) TOPICAL
Qty: 90 TABLET | Refills: 3 | Status: SHIPPED | OUTPATIENT
Start: 2018-06-24 | End: 2018-08-15

## 2018-06-24 RX ORDER — PSEUDOEPHEDRINE HCL 30 MG
100 TABLET ORAL 2 TIMES DAILY
Qty: 60 CAPSULE | Refills: 0 | Status: SHIPPED | OUTPATIENT
Start: 2018-06-24 | End: 2018-08-15 | Stop reason: CLARIF

## 2018-06-24 RX ORDER — OXYCODONE HYDROCHLORIDE AND ACETAMINOPHEN 5; 325 MG/1; MG/1
1 TABLET ORAL EVERY 4 HOURS PRN
Qty: 20 TABLET | Refills: 0 | Status: SHIPPED | OUTPATIENT
Start: 2018-06-24 | End: 2018-07-01

## 2018-06-24 RX ORDER — IBUPROFEN 800 MG/1
800 TABLET ORAL EVERY 8 HOURS PRN
Qty: 60 TABLET | Refills: 0 | Status: SHIPPED | OUTPATIENT
Start: 2018-06-24

## 2018-06-24 RX ADMIN — OXYCODONE HYDROCHLORIDE AND ACETAMINOPHEN 2 TABLET: 5; 325 TABLET ORAL at 11:05

## 2018-06-24 RX ADMIN — IBUPROFEN 800 MG: 800 TABLET ORAL at 01:50

## 2018-06-24 RX ADMIN — OXYCODONE HYDROCHLORIDE AND ACETAMINOPHEN 2 TABLET: 5; 325 TABLET ORAL at 01:50

## 2018-06-24 RX ADMIN — DOCUSATE SODIUM 100 MG: 100 CAPSULE ORAL at 10:07

## 2018-06-24 RX ADMIN — OXYCODONE HYDROCHLORIDE AND ACETAMINOPHEN 2 TABLET: 5; 325 TABLET ORAL at 06:59

## 2018-06-24 RX ADMIN — IBUPROFEN 800 MG: 800 TABLET ORAL at 10:07

## 2018-06-24 ASSESSMENT — PAIN DESCRIPTION - PAIN TYPE: TYPE: SURGICAL PAIN

## 2018-06-24 ASSESSMENT — PAIN SCALES - GENERAL
PAINLEVEL_OUTOF10: 7
PAINLEVEL_OUTOF10: 6
PAINLEVEL_OUTOF10: 7
PAINLEVEL_OUTOF10: 7

## 2018-06-26 ENCOUNTER — POSTPARTUM VISIT (OUTPATIENT)
Dept: OBGYN CLINIC | Age: 33
End: 2018-06-26

## 2018-06-26 VITALS
SYSTOLIC BLOOD PRESSURE: 126 MMHG | WEIGHT: 245 LBS | BODY MASS INDEX: 49.39 KG/M2 | DIASTOLIC BLOOD PRESSURE: 72 MMHG | HEIGHT: 59 IN

## 2018-06-26 DIAGNOSIS — Z98.891 S/P CESAREAN SECTION: ICD-10-CM

## 2018-06-26 DIAGNOSIS — Z98.890 POST-OPERATIVE STATE: ICD-10-CM

## 2018-06-26 PROCEDURE — 99024 POSTOP FOLLOW-UP VISIT: CPT | Performed by: OBSTETRICS & GYNECOLOGY

## 2018-06-27 LAB — SURGICAL PATHOLOGY REPORT: NORMAL

## 2018-06-29 ENCOUNTER — POSTPARTUM VISIT (OUTPATIENT)
Dept: OBGYN CLINIC | Age: 33
End: 2018-06-29

## 2018-06-29 VITALS
WEIGHT: 243 LBS | DIASTOLIC BLOOD PRESSURE: 80 MMHG | HEIGHT: 59 IN | SYSTOLIC BLOOD PRESSURE: 130 MMHG | BODY MASS INDEX: 48.99 KG/M2

## 2018-06-29 DIAGNOSIS — Z98.891 S/P CESAREAN SECTION: ICD-10-CM

## 2018-06-29 DIAGNOSIS — G89.18 POST-OP PAIN: ICD-10-CM

## 2018-06-29 PROCEDURE — 99024 POSTOP FOLLOW-UP VISIT: CPT | Performed by: OBSTETRICS & GYNECOLOGY

## 2018-06-29 RX ORDER — CEPHALEXIN 500 MG/1
500 CAPSULE ORAL 4 TIMES DAILY
Qty: 40 CAPSULE | Refills: 0 | Status: SHIPPED | OUTPATIENT
Start: 2018-06-29 | End: 2018-07-12 | Stop reason: SDUPTHER

## 2018-06-29 RX ORDER — OXYCODONE HYDROCHLORIDE AND ACETAMINOPHEN 5; 325 MG/1; MG/1
1 TABLET ORAL EVERY 6 HOURS PRN
Qty: 12 TABLET | Refills: 0 | Status: SHIPPED | OUTPATIENT
Start: 2018-06-29 | End: 2018-07-02

## 2018-07-12 ENCOUNTER — OFFICE VISIT (OUTPATIENT)
Dept: OBGYN CLINIC | Age: 33
End: 2018-07-12

## 2018-07-12 VITALS
DIASTOLIC BLOOD PRESSURE: 82 MMHG | SYSTOLIC BLOOD PRESSURE: 104 MMHG | HEIGHT: 59 IN | BODY MASS INDEX: 46.04 KG/M2 | WEIGHT: 228.4 LBS

## 2018-07-12 PROCEDURE — 99024 POSTOP FOLLOW-UP VISIT: CPT | Performed by: NURSE PRACTITIONER

## 2018-07-12 RX ORDER — CEPHALEXIN 500 MG/1
500 CAPSULE ORAL 4 TIMES DAILY
Qty: 40 CAPSULE | Refills: 0 | Status: SHIPPED | OUTPATIENT
Start: 2018-07-12 | End: 2018-07-22

## 2018-08-09 ENCOUNTER — TELEPHONE (OUTPATIENT)
Dept: OBGYN CLINIC | Age: 33
End: 2018-08-09

## 2018-08-15 ENCOUNTER — POSTPARTUM VISIT (OUTPATIENT)
Dept: OBGYN CLINIC | Age: 33
End: 2018-08-15

## 2018-08-15 VITALS
DIASTOLIC BLOOD PRESSURE: 70 MMHG | SYSTOLIC BLOOD PRESSURE: 116 MMHG | BODY MASS INDEX: 45.28 KG/M2 | WEIGHT: 224.6 LBS | HEIGHT: 59 IN

## 2018-08-15 DIAGNOSIS — Z01.818 PREOP TESTING: Primary | ICD-10-CM

## 2018-08-15 PROCEDURE — 0503F POSTPARTUM CARE VISIT: CPT | Performed by: OBSTETRICS & GYNECOLOGY

## 2018-08-15 NOTE — PROGRESS NOTES
The patient was seen for her exam six weeks after  section. [id number]. She has no complaints today. She delivered a girl Tanner Fu) on  by , done because of FTP. She is  breast feeding and there are not any signs or symptoms of mastitis. Today her lochia is light she denies any dizziness or shortness of breath. Her pregnancy was complicated by GDMA2 and fibromyalgia, factor V deficiency. She does not have any signs or symptoms of post partum depression. She does admit to having good home support. Her bowels are regular and shedenies any urinary tract symptomology. Her choice for birth control is Nexplanon. She had one before and liked it. She has had sex once (protected).  is to get a vasectomy.   I agree to proceed with Nexplanon it we get a negative pregnancy test.     1. Preop testing       Patient Active Problem List   Diagnosis    FH of factor V deficiency    Fibromyalgia    Anxiety    Weight gain    DDD (degenerative disc disease), lumbar    Vitamin D deficiency    GDMA2    Obesity     RH+/RI/GBS pos    PLTCS 18 F Apg 8/9 Wt 7#12    Acute blood loss anemia       Past Medical History:   Diagnosis Date    Acute blood loss anemia 2018    Anxiety     Chronic back pain     DDD (degenerative disc disease), lumbar     L4-5 ; history of injections     Depression     Fibromyalgia     Gestational diabetes mellitus (GDM) in third trimester 2018    Headache     Obesity affecting pregnancy in first trimester 2017       Past Surgical History:   Procedure Laterality Date    UT  DELIVERY ONLY N/A 2018     SECTION performed by Yandy Woods MD at [facility] L[facility]D OR       History   Smoking Status    Former Smoker    Packs/day: 0.50    Years: 15.00   Smokeless Tobacco    Never Used     History   Alcohol Use No       MEDICATIONS:  Current Outpatient Prescriptions   Medication Sig Dispense Refill    ibuprofen (ADVIL;MOTRIN) 800 MG tablet Take reviewed. Increased risks of respiratory problems, Sudden     infant death syndrome, and potential malignancies. 4. We discussed  sections and future pregnancy outcomes  5.  Birth control will be Nexplanon insertion after negative pregnancy test, until the  completes a vasectomy

## 2018-09-04 ENCOUNTER — HOSPITAL ENCOUNTER (OUTPATIENT)
Age: 33
Setting detail: SPECIMEN
Discharge: HOME | End: 2018-09-04
Payer: COMMERCIAL

## 2018-09-04 DIAGNOSIS — Z01.818 PREOP TESTING: ICD-10-CM

## 2018-09-04 LAB — HCG QUALITATIVE: NEGATIVE

## 2018-09-11 ENCOUNTER — PROCEDURE VISIT (OUTPATIENT)
Dept: OBGYN CLINIC | Age: 33
End: 2018-09-11
Payer: COMMERCIAL

## 2018-09-11 VITALS
WEIGHT: 228.6 LBS | SYSTOLIC BLOOD PRESSURE: 106 MMHG | BODY MASS INDEX: 46.08 KG/M2 | HEIGHT: 59 IN | DIASTOLIC BLOOD PRESSURE: 84 MMHG

## 2018-09-11 DIAGNOSIS — Z30.017 NEXPLANON INSERTION: ICD-10-CM

## 2018-09-11 DIAGNOSIS — Z30.019 ENCOUNTER FOR FEMALE BIRTH CONTROL: Primary | ICD-10-CM

## 2018-09-11 PROCEDURE — 11981 INSERTION DRUG DLVR IMPLANT: CPT | Performed by: NURSE PRACTITIONER

## 2018-10-29 PROBLEM — O24.419 GESTATIONAL DIABETES MELLITUS (GDM) IN THIRD TRIMESTER: Status: RESOLVED | Noted: 2018-04-05 | Resolved: 2018-10-29

## 2018-10-29 PROBLEM — Z83.2 FAMILY HISTORY OF FACTOR V DEFICIENCY: Status: RESOLVED | Noted: 2017-10-17 | Resolved: 2018-10-29

## 2018-10-29 PROBLEM — Z98.891 S/P CESAREAN SECTION: Status: RESOLVED | Noted: 2018-06-22 | Resolved: 2018-10-29

## 2018-10-29 PROBLEM — R63.5 WEIGHT GAIN: Status: RESOLVED | Noted: 2017-10-17 | Resolved: 2018-10-29

## 2018-10-29 PROBLEM — O09.93 HRP (HIGH RISK PREGNANCY), THIRD TRIMESTER: Status: RESOLVED | Noted: 2018-05-03 | Resolved: 2018-10-29

## 2018-11-01 ENCOUNTER — OFFICE VISIT (OUTPATIENT)
Dept: FAMILY MEDICINE CLINIC | Age: 33
End: 2018-11-01
Payer: COMMERCIAL

## 2018-11-01 VITALS
RESPIRATION RATE: 16 BRPM | HEART RATE: 91 BPM | WEIGHT: 228 LBS | HEIGHT: 59 IN | DIASTOLIC BLOOD PRESSURE: 82 MMHG | BODY MASS INDEX: 45.96 KG/M2 | OXYGEN SATURATION: 95 % | SYSTOLIC BLOOD PRESSURE: 128 MMHG

## 2018-11-01 DIAGNOSIS — E55.9 VITAMIN D DEFICIENCY: ICD-10-CM

## 2018-11-01 DIAGNOSIS — Z76.89 ESTABLISHING CARE WITH NEW DOCTOR, ENCOUNTER FOR: ICD-10-CM

## 2018-11-01 DIAGNOSIS — M79.641 RIGHT HAND PAIN: ICD-10-CM

## 2018-11-01 DIAGNOSIS — G89.29 CHRONIC MIDLINE LOW BACK PAIN WITH RIGHT-SIDED SCIATICA: ICD-10-CM

## 2018-11-01 DIAGNOSIS — M79.7 FIBROMYALGIA: ICD-10-CM

## 2018-11-01 DIAGNOSIS — O99.213 OBESITY AFFECTING PREGNANCY IN THIRD TRIMESTER: ICD-10-CM

## 2018-11-01 DIAGNOSIS — D64.9 ANEMIA, UNSPECIFIED TYPE: ICD-10-CM

## 2018-11-01 DIAGNOSIS — F41.9 ANXIETY: ICD-10-CM

## 2018-11-01 DIAGNOSIS — Z01.419 WELL WOMAN EXAM: Primary | ICD-10-CM

## 2018-11-01 DIAGNOSIS — Z23 NEED FOR 23-POLYVALENT PNEUMOCOCCAL POLYSACCHARIDE VACCINE: ICD-10-CM

## 2018-11-01 DIAGNOSIS — M54.41 CHRONIC MIDLINE LOW BACK PAIN WITH RIGHT-SIDED SCIATICA: ICD-10-CM

## 2018-11-01 PROBLEM — G56.00 CARPAL TUNNEL SYNDROME: Status: ACTIVE | Noted: 2018-11-01

## 2018-11-01 PROBLEM — G56.00 CARPAL TUNNEL SYNDROME: Status: RESOLVED | Noted: 2018-11-01 | Resolved: 2018-11-01

## 2018-11-01 PROCEDURE — 99213 OFFICE O/P EST LOW 20 MIN: CPT | Performed by: FAMILY MEDICINE

## 2018-11-01 PROCEDURE — 99395 PREV VISIT EST AGE 18-39: CPT | Performed by: FAMILY MEDICINE

## 2018-11-01 PROCEDURE — 90732 PPSV23 VACC 2 YRS+ SUBQ/IM: CPT | Performed by: FAMILY MEDICINE

## 2018-11-01 PROCEDURE — 90471 IMMUNIZATION ADMIN: CPT | Performed by: FAMILY MEDICINE

## 2018-11-01 RX ORDER — DULOXETIN HYDROCHLORIDE 60 MG/1
60 CAPSULE, DELAYED RELEASE ORAL DAILY
Qty: 30 CAPSULE | Refills: 1 | Status: SHIPPED | OUTPATIENT
Start: 2018-11-01 | End: 2019-02-28 | Stop reason: SDUPTHER

## 2018-11-01 RX ORDER — NAPROXEN 500 MG/1
500 TABLET ORAL 2 TIMES DAILY WITH MEALS
Qty: 60 TABLET | Refills: 3 | Status: SHIPPED | OUTPATIENT
Start: 2018-11-01

## 2018-11-01 ASSESSMENT — ENCOUNTER SYMPTOMS
SHORTNESS OF BREATH: 0
EYE PAIN: 0
BLOOD IN STOOL: 0
BACK PAIN: 1

## 2018-11-01 ASSESSMENT — PATIENT HEALTH QUESTIONNAIRE - PHQ9
SUM OF ALL RESPONSES TO PHQ QUESTIONS 1-9: 2
1. LITTLE INTEREST OR PLEASURE IN DOING THINGS: 1
2. FEELING DOWN, DEPRESSED OR HOPELESS: 1
SUM OF ALL RESPONSES TO PHQ QUESTIONS 1-9: 2
SUM OF ALL RESPONSES TO PHQ9 QUESTIONS 1 & 2: 2

## 2018-11-01 NOTE — PROGRESS NOTES
Bel Chappell MD  [facility]  COMPREHENSIVE CARE  [address] [address]  Dept: [phone]    Carley Fernandez is a 26 y.o. female who presents today for hermedical conditions/complaints as noted below.   Carley Fernandez is here today c/o Establish Care and Hand Pain (gestational carpal tunnel, hasnt gone away)       HPI:     HPI    Here to establish care and for well check  Stay at home mom, 2 kids     Family in [place], moved here due to 's work    Delivered 4 months ago, bottle feeding  Blood loss anemia after  section, last hemoglobin 7.9, needs repeat along with screening labs  Fibromyalgia, tried Neurontin and Lyrica in the past - stopped as they caused tiredness, degenerative changes at L4L5 on MRI 10 years ago wants repeat MRI to check for changes, back pain is in low back with intermittent radiation down R leg, no fevers, night sweats, or weight loss; no bowel or bladder incontinence; no urinary retention; no numbness/weakness in the legs  Anxiety, was seeing psychiatrist Dr. Marco Crabtree, feels overwhelmed with her , no family support as they live in [place], her  tries to be helpful but his hours are long, denies feeling depressed, no SI, feels competent caring for her baby  Carpal tunnel syndrome on R, diagnosed during pregnancy, swelling is better following delivery but the pain persists, pain is all over the hand, worse after doing any activity, she is right-handed, tried wrist brace without benefit    Last Pap: 2017, LSIL, needs follow-up Pap in November  Contraception: Nexplanon    Patient Active Problem List   Diagnosis    Fibromyalgia    Anxiety    DDD (degenerative disc disease), lumbar    Vitamin D deficiency    Obesity     Acute blood loss anemia    Carpal tunnel syndrome    Chronic midline low back pain with right-sided sciatica       Past Medical History:   Diagnosis Date    Acute blood loss anemia

## 2018-11-27 ENCOUNTER — OFFICE VISIT (OUTPATIENT)
Dept: OBGYN CLINIC | Age: 33
End: 2018-11-27
Payer: COMMERCIAL

## 2018-11-27 ENCOUNTER — HOSPITAL ENCOUNTER (OUTPATIENT)
Age: 33
Setting detail: SPECIMEN
Discharge: HOME | End: 2018-11-27
Payer: COMMERCIAL

## 2018-11-27 VITALS
DIASTOLIC BLOOD PRESSURE: 78 MMHG | WEIGHT: 225.8 LBS | SYSTOLIC BLOOD PRESSURE: 108 MMHG | BODY MASS INDEX: 44.33 KG/M2 | HEIGHT: 60 IN

## 2018-11-27 DIAGNOSIS — Z01.419 ENCOUNTER FOR GYNECOLOGICAL EXAMINATION: Primary | ICD-10-CM

## 2018-11-27 PROCEDURE — 99395 PREV VISIT EST AGE 18-39: CPT | Performed by: NURSE PRACTITIONER

## 2018-11-27 ASSESSMENT — ENCOUNTER SYMPTOMS
CONSTIPATION: 0
ABDOMINAL PAIN: 0
SHORTNESS OF BREATH: 0
DIARRHEA: 0
ABDOMINAL DISTENTION: 0
COUGH: 0
BACK PAIN: 0

## 2018-11-27 NOTE — PROGRESS NOTES
distension and no mass. There is no tenderness. There is no CVA tenderness. Hernia confirmed negative in the right inguinal area and confirmed negative in the left inguinal area. Genitourinary: No breast tenderness, discharge or bleeding. There is no rash or lesion on the right labia. There is no rash or lesion on the left labia. Uterus is not deviated, not enlarged and not fixed. Cervix exhibits no motion tenderness, no discharge and no friability. Right adnexum displays no mass, no tenderness and no fullness. Left adnexum displays no mass, no tenderness and no fullness. No tenderness in the vagina. No vaginal discharge found. Musculoskeletal: She exhibits no edema or tenderness. Lymphadenopathy:     She has no cervical adenopathy. Right: No inguinal adenopathy present. Left: No inguinal adenopathy present. Neurological: She is alert and oriented to person, place, and time. Skin: Skin is warm and dry. Psychiatric: She has a normal mood and affect. Her behavior is normal. Judgment and thought content normal.         Assessment:     1. Encounter for gynecological examination          Plan:   1. Pap collected. Discussed new pap smear guidelines. Desires re-pap in 1 year. 2. Breast self exam reviewed  3. Calcium and Vitamin D dosing reviewed. 4. Seat belt use reviewed  5. Family planning reviewed.   Birth control nexplanon    Electronicallysigned by Maximilian Chacon on 11/27/2018

## 2018-12-10 LAB — CYTOLOGY REPORT: NORMAL

## 2019-02-05 ENCOUNTER — HOSPITAL ENCOUNTER (OUTPATIENT)
Dept: NEUROLOGY | Age: 34
Discharge: HOME | End: 2019-02-05
Payer: COMMERCIAL

## 2019-02-05 DIAGNOSIS — M79.641 RIGHT HAND PAIN: ICD-10-CM

## 2019-02-05 PROCEDURE — 95886 MUSC TEST DONE W/N TEST COMP: CPT | Performed by: PHYSICAL MEDICINE & REHABILITATION

## 2019-02-05 PROCEDURE — 95908 NRV CNDJ TST 3-4 STUDIES: CPT | Performed by: PHYSICAL MEDICINE & REHABILITATION

## 2019-02-28 ENCOUNTER — HOSPITAL ENCOUNTER (OUTPATIENT)
Age: 34
Setting detail: SPECIMEN
Discharge: HOME | End: 2019-02-28
Payer: COMMERCIAL

## 2019-02-28 ENCOUNTER — OFFICE VISIT (OUTPATIENT)
Dept: FAMILY MEDICINE CLINIC | Age: 34
End: 2019-02-28
Payer: COMMERCIAL

## 2019-02-28 VITALS
BODY MASS INDEX: 44.92 KG/M2 | RESPIRATION RATE: 16 BRPM | HEART RATE: 105 BPM | DIASTOLIC BLOOD PRESSURE: 85 MMHG | SYSTOLIC BLOOD PRESSURE: 124 MMHG | HEIGHT: 59 IN | OXYGEN SATURATION: 95 % | WEIGHT: 222.8 LBS

## 2019-02-28 DIAGNOSIS — D64.9 ANEMIA, UNSPECIFIED TYPE: ICD-10-CM

## 2019-02-28 DIAGNOSIS — O99.213 OBESITY AFFECTING PREGNANCY IN THIRD TRIMESTER: ICD-10-CM

## 2019-02-28 DIAGNOSIS — F41.9 ANXIETY: ICD-10-CM

## 2019-02-28 DIAGNOSIS — G56.01 RIGHT CARPAL TUNNEL SYNDROME: Primary | ICD-10-CM

## 2019-02-28 DIAGNOSIS — E55.9 VITAMIN D DEFICIENCY: ICD-10-CM

## 2019-02-28 DIAGNOSIS — M79.7 FIBROMYALGIA: ICD-10-CM

## 2019-02-28 DIAGNOSIS — Z01.419 WELL WOMAN EXAM: ICD-10-CM

## 2019-02-28 LAB
ABSOLUTE EOS #: 0.21 K/UL (ref 0–0.44)
ABSOLUTE IMMATURE GRANULOCYTE: 0.03 K/UL (ref 0–0.3)
ABSOLUTE LYMPH #: 2.05 K/UL (ref 1.1–3.7)
ABSOLUTE MONO #: 0.66 K/UL (ref 0.1–1.2)
BASOPHILS # BLD: 1 % (ref 0–2)
BASOPHILS ABSOLUTE: 0.06 K/UL (ref 0–0.2)
CHOLESTEROL/HDL RATIO: 2.8
CHOLESTEROL: 108 MG/DL
DIFFERENTIAL TYPE: ABNORMAL
EOSINOPHILS RELATIVE PERCENT: 2 % (ref 1–4)
ESTIMATED AVERAGE GLUCOSE: 117 MG/DL
FERRITIN: 34 UG/L (ref 13–150)
HBA1C MFR BLD: 5.7 % (ref 4–6)
HCT VFR BLD CALC: 45.2 % (ref 36.3–47.1)
HDLC SERPL-MCNC: 39 MG/DL
HEMOGLOBIN: 14.8 G/DL (ref 11.9–15.1)
IMMATURE GRANULOCYTES: 0 %
LDL CHOLESTEROL: 51 MG/DL (ref 0–130)
LYMPHOCYTES # BLD: 18 % (ref 24–43)
MCH RBC QN AUTO: 28.8 PG (ref 25.2–33.5)
MCHC RBC AUTO-ENTMCNC: 32.7 G/DL (ref 28.4–34.8)
MCV RBC AUTO: 88.1 FL (ref 82.6–102.9)
MONOCYTES # BLD: 6 % (ref 3–12)
NRBC AUTOMATED: 0 PER 100 WBC
PDW BLD-RTO: 14.6 % (ref 11.8–14.4)
PLATELET # BLD: 453 K/UL (ref 138–453)
PLATELET ESTIMATE: ABNORMAL
PMV BLD AUTO: 9.8 FL (ref 8.1–13.5)
RBC # BLD: 5.13 M/UL (ref 3.95–5.11)
RBC # BLD: ABNORMAL 10*6/UL
SEG NEUTROPHILS: 73 % (ref 36–65)
SEGMENTED NEUTROPHILS ABSOLUTE COUNT: 8.72 K/UL (ref 1.5–8.1)
TRIGL SERPL-MCNC: 91 MG/DL
TSH SERPL DL<=0.05 MIU/L-ACNC: 4 MIU/L (ref 0.3–5)
VITAMIN B-12: 508 PG/ML (ref 232–1245)
VITAMIN D 25-HYDROXY: 11.8 NG/ML (ref 30–100)
VLDLC SERPL CALC-MCNC: ABNORMAL MG/DL (ref 1–30)
WBC # BLD: 11.7 K/UL (ref 3.5–11.3)
WBC # BLD: ABNORMAL 10*3/UL

## 2019-02-28 PROCEDURE — 99214 OFFICE O/P EST MOD 30 MIN: CPT | Performed by: FAMILY MEDICINE

## 2019-02-28 RX ORDER — DULOXETIN HYDROCHLORIDE 30 MG/1
30 CAPSULE, DELAYED RELEASE ORAL DAILY
Qty: 30 CAPSULE | Refills: 1 | Status: SHIPPED | OUTPATIENT
Start: 2019-02-28

## 2019-02-28 ASSESSMENT — ENCOUNTER SYMPTOMS
EYE PAIN: 0
SHORTNESS OF BREATH: 0
BLOOD IN STOOL: 0

## 2019-02-28 ASSESSMENT — PATIENT HEALTH QUESTIONNAIRE - PHQ9
1. LITTLE INTEREST OR PLEASURE IN DOING THINGS: 0
SUM OF ALL RESPONSES TO PHQ QUESTIONS 1-9: 0
2. FEELING DOWN, DEPRESSED OR HOPELESS: 0
SUM OF ALL RESPONSES TO PHQ QUESTIONS 1-9: 0
SUM OF ALL RESPONSES TO PHQ9 QUESTIONS 1 & 2: 0

## 2019-03-01 DIAGNOSIS — E55.9 VITAMIN D DEFICIENCY: Primary | ICD-10-CM

## 2019-03-01 RX ORDER — CHOLECALCIFEROL (VITAMIN D3) 50 MCG
2000 TABLET ORAL DAILY
Qty: 90 TABLET | Refills: 3 | Status: SHIPPED | OUTPATIENT
Start: 2019-03-01

## 2019-03-01 RX ORDER — ERGOCALCIFEROL (VITAMIN D2) 1250 MCG
50000 CAPSULE ORAL WEEKLY
Qty: 4 CAPSULE | Refills: 2 | Status: SHIPPED | OUTPATIENT
Start: 2019-03-01 | End: 2019-04-29

## 2019-04-22 ENCOUNTER — OFFICE VISIT (OUTPATIENT)
Dept: ORTHOPEDIC SURGERY | Age: 34
End: 2019-04-22
Payer: COMMERCIAL

## 2019-04-22 ENCOUNTER — TELEPHONE (OUTPATIENT)
Dept: ORTHOPEDIC SURGERY | Age: 34
End: 2019-04-22

## 2019-04-22 VITALS — WEIGHT: 222.88 LBS | HEIGHT: 59 IN | BODY MASS INDEX: 44.93 KG/M2

## 2019-04-22 DIAGNOSIS — G56.01 RIGHT CARPAL TUNNEL SYNDROME: Primary | ICD-10-CM

## 2019-04-22 PROCEDURE — 99203 OFFICE O/P NEW LOW 30 MIN: CPT | Performed by: ORTHOPAEDIC SURGERY

## 2019-04-22 NOTE — LETTER
4/22/2019    Alden Horne MD  [address] [place] [address], [address] [place] [address]    RE: Ethan Zheng    Dear Dr. Orlean Carrel,    Thank you for allowing me to participate in the care of Ms. Tana Rashid. I had the opportunity to evaluate the patient on 4/22/2019. Attached you will find my evaluation and recommendations. Thanks again for the confidence you have expressed in me by allowing my participation in the care of your patient. I will keep you apprised of further developments in the patients treatment course as it progresses. If I can be of further assistance in any fashion, please feel free to contact me at your convenience.     Sincerely,        Blake Palma  Shoulder and Elbow Surgery

## 2019-04-22 NOTE — TELEPHONE ENCOUNTER
Nothing I routinely do, but certainly if she's interested in an injection she can be set up for one.

## 2019-04-23 ENCOUNTER — HOSPITAL ENCOUNTER (OUTPATIENT)
Dept: PHYSICAL THERAPY | Age: 34
Setting detail: THERAPIES SERIES
Discharge: HOME | End: 2019-04-23
Payer: COMMERCIAL

## 2019-04-27 NOTE — PROGRESS NOTES
ORTHOPEDIC PATIENT EVALUATION      HPI / Chief Complaint  Leopoldo Admire is a 26 y.o. female who presents for evaluation of her right hand. She indicates that over the course of the past several months she has experienced progressively worsening numbness and tingling in her middle 3 fingers when she does anything lasting more than a minute. She has some throbbing pain over the dorsum of the hand into the middle finger. Her symptoms are worse with activities like driving vacuuming etc.  She also reports that she is losing  strength. She states that when she was pregnant use to bother her a lot nighttime and was diagnosed 7 months into her pregnancy with gestational carpal tunnel syndrome. Her baby is now 5 months old and she has persistent symptoms. Past Medical History  [facility]  has a past medical history of Acute blood loss anemia, Anxiety, Chronic back pain, DDD (degenerative disc disease), lumbar, Depression, Fibromyalgia, Gestational diabetes mellitus (GDM) in third trimester, Headache, LGSIL of cervix of undetermined significance, and Obesity affecting pregnancy in first trimester. Past Surgical History  [facility]  has a past surgical history that includes pr  delivery only (N/A, 2018) and Insertion of contraceptive capsule (Left, 2018).     Current Medications  Current Outpatient Medications   Medication Sig Dispense Refill    ergocalciferol (ERGOCALCIFEROL) 50023 units capsule Take 1 capsule by mouth once a week Start with Vitamin D2 50,000 units weekly for 12 weeks then take Vitamin D3 2000 units daily thereafter 4 capsule 2    Cholecalciferol (VITAMIN D) 2000 units TABS tablet Take 1 tablet by mouth daily 90 tablet 3    DULoxetine (CYMBALTA) 30 MG extended release capsule Take 1 capsule by mouth daily 30 capsule 1    naproxen (NAPROSYN) 500 MG tablet Take 1 tablet by mouth 2 times daily (with meals) 60 tablet 3    ibuprofen (ADVIL;MOTRIN) 800 MG tablet Take 1 tablet by mouth every 8 hours as needed for Pain or Fever 60 tablet 0     Current Facility-Administered Medications   Medication Dose Route Frequency Provider Last Rate Last Dose    etonogestrel (NEXPLANON) implant 68 mg  68 mg Subdermal Continuous COURTNEY Jimenez CNP   68 mg at 09/11/18 1153       Allergies  Allergies have been reviewed. Rupinder Lo has No Known Allergies. Social History  Rupinder Lo  reports that she has quit smoking. She has a 7.50 pack-year smoking history. She has never used smokeless tobacco. She reports that she does not drink alcohol or use drugs. Family History  Benigno's family history includes Breast Cancer in her maternal grandmother; Depression in her paternal grandmother; High Blood Pressure in her mother; Other in her brother and mother; Stroke in her brother. Review of Systems   History obtained from the patient. REVIEW OF SYSTEMS:   Constitution: negative for fever, chills, weight loss or malaise   Musculoskeletal: As noted in the HPI   Neurologic: As noted in the HPI    Physical Exam  Ht 4' 11.49\" (1.511 m)   Wt 222 lb 14.2 oz (101.1 kg)   BMI 44.28 kg/m²    General Appearance: alert, well appearing, and in no distress  Mental Status: alert, oriented to person, place, and time  Evaluation of the patient's right hand and upper extremity demonstrates intact skin without any warmth, erythema, or notable swelling. She also has no notable intrinsic and wasting. Sensation is altered to light touch in the middle and ring fingers. She has a 2+ radial pulse with brisk capillary refill in her fingers. She has positive Tinel's at the carpal tunnel. She has weakened  strength grossly compared to the contralateral side.     Imaging Studies  None today    Diagnostics and Labs  EMG/nerve conduction study from 2/5/19 was reviewed demonstrating findings consistent with moderate carpal tunnel syndrome    Assessment and Plan  Carlie Armas is a 26 y.o. old female with right hand

## 2019-04-29 ENCOUNTER — HOSPITAL ENCOUNTER (OUTPATIENT)
Dept: PREADMISSION TESTING | Age: 34
Discharge: HOME | End: 2019-05-03
Payer: COMMERCIAL

## 2019-04-29 VITALS
SYSTOLIC BLOOD PRESSURE: 119 MMHG | DIASTOLIC BLOOD PRESSURE: 84 MMHG | HEIGHT: 59 IN | BODY MASS INDEX: 44.76 KG/M2 | TEMPERATURE: 98.6 F | WEIGHT: 222 LBS | RESPIRATION RATE: 16 BRPM | HEART RATE: 91 BPM | OXYGEN SATURATION: 98 %

## 2019-04-29 LAB
ABSOLUTE EOS #: 0.2 K/UL (ref 0–0.4)
ABSOLUTE IMMATURE GRANULOCYTE: ABNORMAL K/UL (ref 0–0.3)
ABSOLUTE LYMPH #: 2.6 K/UL (ref 1–4.8)
ABSOLUTE MONO #: 0.8 K/UL (ref 0.1–1.3)
BASOPHILS # BLD: 1 % (ref 0–2)
BASOPHILS ABSOLUTE: 0.1 K/UL (ref 0–0.2)
DIFFERENTIAL TYPE: ABNORMAL
EOSINOPHILS RELATIVE PERCENT: 2 % (ref 0–4)
HCT VFR BLD CALC: 40.5 % (ref 36–46)
HEMOGLOBIN: 13.3 G/DL (ref 12–16)
IMMATURE GRANULOCYTES: ABNORMAL %
LYMPHOCYTES # BLD: 22 % (ref 24–44)
MCH RBC QN AUTO: 28 PG (ref 26–34)
MCHC RBC AUTO-ENTMCNC: 32.8 G/DL (ref 31–37)
MCV RBC AUTO: 85.3 FL (ref 80–100)
MONOCYTES # BLD: 7 % (ref 1–7)
NRBC AUTOMATED: ABNORMAL PER 100 WBC
PDW BLD-RTO: 14.8 % (ref 11.5–14.9)
PLATELET # BLD: 395 K/UL (ref 150–450)
PLATELET ESTIMATE: ABNORMAL
PMV BLD AUTO: 8 FL (ref 6–12)
RBC # BLD: 4.75 M/UL (ref 4–5.2)
RBC # BLD: ABNORMAL 10*6/UL
SEG NEUTROPHILS: 68 % (ref 36–66)
SEGMENTED NEUTROPHILS ABSOLUTE COUNT: 8.2 K/UL (ref 1.3–9.1)
WBC # BLD: 11.9 K/UL (ref 3.5–11)
WBC # BLD: ABNORMAL 10*3/UL

## 2019-04-29 PROCEDURE — 85025 COMPLETE CBC W/AUTO DIFF WBC: CPT

## 2019-04-29 PROCEDURE — 36415 COLL VENOUS BLD VENIPUNCTURE: CPT

## 2019-04-29 ASSESSMENT — PAIN DESCRIPTION - ORIENTATION: ORIENTATION: RIGHT

## 2019-04-29 ASSESSMENT — PAIN DESCRIPTION - LOCATION: LOCATION: HAND

## 2019-04-29 ASSESSMENT — PAIN DESCRIPTION - PAIN TYPE: TYPE: CHRONIC PAIN

## 2019-04-29 ASSESSMENT — PAIN SCALES - GENERAL: PAINLEVEL_OUTOF10: 3

## 2019-04-29 NOTE — H&P
500 MG tablet Take 1 tablet by mouth 2 times daily (with meals) (Patient taking differently: Take 500 mg by mouth 2 times daily (with meals) Indications: as needed ) 60 tablet 3     Current Facility-Administered Medications on File Prior to Encounter   Medication Dose Route Frequency Provider Last Rate Last Dose    etonogestrel (NEXPLANON) implant 68 mg  68 mg Subdermal Continuous Ruthellen Moritz, APRN - CNP   68 mg at 09/11/18 1153        General health:  Fairly good. No fever or chills. Skin:  No itching, redness or rash. HEENT:  No headache, epistaxis or sore throat. Neck:  No pain, stiffness or masses. Cardiovascular/Respiratory system:  No chest pain, palpitation or shortness of breath. Gastrointestinal tract: No abdominal pain, nausea, vomiting, diarrhea or constipation. Genitourinary:  No burning on micturition. No hesitancy, urgency, frequency or discoloration of urine. Locomotor:  See HPI. Neuropsychiatric:  No referable complaints. GENERAL PHYSICAL EXAM:     Vitals: /84   Pulse 91   Temp 98.6 °F (37 °C) (Oral)   Resp 16   Ht 4' 11.49\" (1.511 m)   Wt 222 lb (100.7 kg)   SpO2 98%   BMI 44.10 kg/m²  Body mass index is 44.1 kg/m². GENERAL APPEARANCE:   Nino Hudson is 26 y.o., ,  female, moderately obese, nourished, conscious, alert. Does not appear to be distress or pain at this time. SKIN:  Warm, dry, no cyanosis or jaundice. HEAD:  Normocephalic, atraumatic, no swelling or tenderness. EYES:  Pupils equal, reactive to light. EARS:  No discharge, no marked hearing loss. NOSE:  No rhinorrhea, epistaxis or septal deformity. THROAT:  Not congested. No ulceration bleeding or discharge.                   NECK:  No stiffness, trachea central.  No palpable masses or L.N.                 CHEST:  Symmetrical and equal on expansion. HEART:  RRR S1 > S2. No audible murmurs or gallops. LUNGS:  Equal on expansion, normal breath sounds. No adventitious sounds. ABDOMEN:  Obese. Soft on palpation. No localized tenderness. No guarding or rigidity. No palpable hepatosplenomegaly. LYMPHATICS:  No palpable cervical lymphadenopathy. LOCOMOTOR, BACK AND SPINE:  No tenderness or deformities. EXTREMITIES:  Symmetrical, no pretibial edema. Fifis sign negative. No discoloration or ulcerations. Positive Phalens Test and Tinels Sign on the right wrist.    NEUROLOGIC:  The patient is conscious, alert, oriented,Cranial nerve II-XII intact, taste was not examined. No apparent focal sensory or motor deficits.                                                                                      PROVISIONAL DIAGNOSES / SURGERY:       CARPAL TUNNEL SYNDROME    CARPAL TUNNEL RELEASE Right    Patient Active Problem List    Diagnosis Date Noted    Right carpal tunnel syndrome 11/01/2018    Chronic midline low back pain with right-sided sciatica 11/01/2018    Acute blood loss anemia 06/22/2018    Obesity  04/30/2018    Vitamin D deficiency 10/31/2017    Fibromyalgia     Anxiety     DDD (degenerative disc disease), lumbar            FEROZ OSPINA, APRN - CNP on 4/29/2019 at 2:58 PM

## 2019-04-30 ENCOUNTER — ANESTHESIA EVENT (OUTPATIENT)
Dept: OPERATING ROOM | Age: 34
End: 2019-04-30
Payer: COMMERCIAL

## 2019-04-30 RX ORDER — SODIUM CHLORIDE 0.9 % (FLUSH) 0.9 %
10 SYRINGE (ML) INJECTION EVERY 12 HOURS SCHEDULED
Status: CANCELLED | OUTPATIENT
Start: 2019-04-30

## 2019-04-30 RX ORDER — LIDOCAINE HYDROCHLORIDE 10 MG/ML
1 INJECTION, SOLUTION EPIDURAL; INFILTRATION; INTRACAUDAL; PERINEURAL
Status: CANCELLED | OUTPATIENT
Start: 2019-04-30 | End: 2019-04-30

## 2019-04-30 RX ORDER — SODIUM CHLORIDE, SODIUM LACTATE, POTASSIUM CHLORIDE, CALCIUM CHLORIDE 600; 310; 30; 20 MG/100ML; MG/100ML; MG/100ML; MG/100ML
INJECTION, SOLUTION INTRAVENOUS CONTINUOUS
Status: CANCELLED | OUTPATIENT
Start: 2019-04-30

## 2019-04-30 RX ORDER — SODIUM CHLORIDE 0.9 % (FLUSH) 0.9 %
10 SYRINGE (ML) INJECTION PRN
Status: CANCELLED | OUTPATIENT
Start: 2019-04-30

## 2019-05-03 ENCOUNTER — OFFICE VISIT (OUTPATIENT)
Dept: ORTHOPEDIC SURGERY | Age: 34
End: 2019-05-03

## 2019-05-03 DIAGNOSIS — G56.01 RIGHT CARPAL TUNNEL SYNDROME: Primary | ICD-10-CM

## 2019-05-03 PROCEDURE — 99024 POSTOP FOLLOW-UP VISIT: CPT | Performed by: ORTHOPAEDIC SURGERY

## 2019-05-03 RX ORDER — HYDROCODONE BITARTRATE AND ACETAMINOPHEN 5; 325 MG/1; MG/1
1 TABLET ORAL EVERY 4 HOURS
Qty: 42 TABLET | Refills: 0 | Status: SHIPPED | OUTPATIENT
Start: 2019-05-03 | End: 2019-05-10

## 2019-05-03 RX ORDER — ONDANSETRON 4 MG/1
4 TABLET, FILM COATED ORAL DAILY PRN
Qty: 20 TABLET | Refills: 0 | Status: SHIPPED | OUTPATIENT
Start: 2019-05-03

## 2019-05-07 ENCOUNTER — PREP FOR PROCEDURE (OUTPATIENT)
Dept: ORTHOPEDIC SURGERY | Age: 34
End: 2019-05-07

## 2019-05-07 RX ORDER — SODIUM CHLORIDE 0.9 % (FLUSH) 0.9 %
10 SYRINGE (ML) INJECTION PRN
Status: CANCELLED | OUTPATIENT
Start: 2019-05-07

## 2019-05-07 RX ORDER — SODIUM CHLORIDE 0.9 % (FLUSH) 0.9 %
10 SYRINGE (ML) INJECTION EVERY 12 HOURS SCHEDULED
Status: CANCELLED | OUTPATIENT
Start: 2019-05-07

## 2019-05-07 RX ORDER — ACETAMINOPHEN 325 MG/1
1000 TABLET ORAL ONCE
Status: CANCELLED | OUTPATIENT
Start: 2019-05-07 | End: 2019-05-07

## 2019-05-08 ENCOUNTER — ANESTHESIA (OUTPATIENT)
Dept: OPERATING ROOM | Age: 34
End: 2019-05-08
Payer: COMMERCIAL

## 2019-05-08 ENCOUNTER — HOSPITAL ENCOUNTER (OUTPATIENT)
Age: 34
Setting detail: OUTPATIENT SURGERY
Discharge: HOME | End: 2019-05-08
Attending: ORTHOPAEDIC SURGERY | Admitting: ORTHOPAEDIC SURGERY
Payer: COMMERCIAL

## 2019-05-08 VITALS
HEART RATE: 72 BPM | WEIGHT: 222 LBS | RESPIRATION RATE: 16 BRPM | OXYGEN SATURATION: 98 % | BODY MASS INDEX: 44.76 KG/M2 | DIASTOLIC BLOOD PRESSURE: 72 MMHG | TEMPERATURE: 97.2 F | HEIGHT: 59 IN | SYSTOLIC BLOOD PRESSURE: 118 MMHG

## 2019-05-08 VITALS — OXYGEN SATURATION: 97 % | SYSTOLIC BLOOD PRESSURE: 123 MMHG | TEMPERATURE: 97 F | DIASTOLIC BLOOD PRESSURE: 73 MMHG

## 2019-05-08 LAB
-: NORMAL
HCG, PREGNANCY URINE (POC): NEGATIVE

## 2019-05-08 PROCEDURE — 3700000000 HC ANESTHESIA ATTENDED CARE: Performed by: ORTHOPAEDIC SURGERY

## 2019-05-08 PROCEDURE — 2580000003 HC RX 258: Performed by: ANESTHESIOLOGY

## 2019-05-08 PROCEDURE — 7100000031 HC ASPR PHASE II RECOVERY - ADDTL 15 MIN: Performed by: ORTHOPAEDIC SURGERY

## 2019-05-08 PROCEDURE — 3600000012 HC SURGERY LEVEL 2 ADDTL 15MIN: Performed by: ORTHOPAEDIC SURGERY

## 2019-05-08 PROCEDURE — 6370000000 HC RX 637 (ALT 250 FOR IP): Performed by: ORTHOPAEDIC SURGERY

## 2019-05-08 PROCEDURE — 7100000001 HC PACU RECOVERY - ADDTL 15 MIN: Performed by: ORTHOPAEDIC SURGERY

## 2019-05-08 PROCEDURE — 7100000030 HC ASPR PHASE II RECOVERY - FIRST 15 MIN: Performed by: ORTHOPAEDIC SURGERY

## 2019-05-08 PROCEDURE — 6360000002 HC RX W HCPCS: Performed by: ORTHOPAEDIC SURGERY

## 2019-05-08 PROCEDURE — 6370000000 HC RX 637 (ALT 250 FOR IP): Performed by: ANESTHESIOLOGY

## 2019-05-08 PROCEDURE — 3600000002 HC SURGERY LEVEL 2 BASE: Performed by: ORTHOPAEDIC SURGERY

## 2019-05-08 PROCEDURE — 64721 CARPAL TUNNEL SURGERY: CPT | Performed by: ORTHOPAEDIC SURGERY

## 2019-05-08 PROCEDURE — 2709999900 HC NON-CHARGEABLE SUPPLY: Performed by: ORTHOPAEDIC SURGERY

## 2019-05-08 PROCEDURE — 7100000000 HC PACU RECOVERY - FIRST 15 MIN: Performed by: ORTHOPAEDIC SURGERY

## 2019-05-08 PROCEDURE — 2500000003 HC RX 250 WO HCPCS: Performed by: NURSE ANESTHETIST, CERTIFIED REGISTERED

## 2019-05-08 PROCEDURE — 3700000001 HC ADD 15 MINUTES (ANESTHESIA): Performed by: ORTHOPAEDIC SURGERY

## 2019-05-08 PROCEDURE — 6360000002 HC RX W HCPCS: Performed by: NURSE ANESTHETIST, CERTIFIED REGISTERED

## 2019-05-08 PROCEDURE — 2500000003 HC RX 250 WO HCPCS: Performed by: ORTHOPAEDIC SURGERY

## 2019-05-08 PROCEDURE — 84703 CHORIONIC GONADOTROPIN ASSAY: CPT

## 2019-05-08 RX ORDER — BUPIVACAINE HYDROCHLORIDE 5 MG/ML
INJECTION, SOLUTION EPIDURAL; INTRACAUDAL PRN
Status: DISCONTINUED | OUTPATIENT
Start: 2019-05-08 | End: 2019-05-08 | Stop reason: ALTCHOICE

## 2019-05-08 RX ORDER — ACETAMINOPHEN 500 MG
1000 TABLET ORAL ONCE
Status: COMPLETED | OUTPATIENT
Start: 2019-05-08 | End: 2019-05-08

## 2019-05-08 RX ORDER — METOCLOPRAMIDE HYDROCHLORIDE 5 MG/ML
10 INJECTION INTRAMUSCULAR; INTRAVENOUS
Status: DISCONTINUED | OUTPATIENT
Start: 2019-05-08 | End: 2019-05-08 | Stop reason: HOSPADM

## 2019-05-08 RX ORDER — ONDANSETRON 2 MG/ML
4 INJECTION INTRAMUSCULAR; INTRAVENOUS
Status: DISCONTINUED | OUTPATIENT
Start: 2019-05-08 | End: 2019-05-08 | Stop reason: HOSPADM

## 2019-05-08 RX ORDER — HYDRALAZINE HYDROCHLORIDE 20 MG/ML
5 INJECTION INTRAMUSCULAR; INTRAVENOUS EVERY 10 MIN PRN
Status: DISCONTINUED | OUTPATIENT
Start: 2019-05-08 | End: 2019-05-08 | Stop reason: HOSPADM

## 2019-05-08 RX ORDER — MEPERIDINE HYDROCHLORIDE 50 MG/ML
12.5 INJECTION INTRAMUSCULAR; INTRAVENOUS; SUBCUTANEOUS EVERY 5 MIN PRN
Status: DISCONTINUED | OUTPATIENT
Start: 2019-05-08 | End: 2019-05-08 | Stop reason: HOSPADM

## 2019-05-08 RX ORDER — LABETALOL 20 MG/4 ML (5 MG/ML) INTRAVENOUS SYRINGE
5 EVERY 10 MIN PRN
Status: DISCONTINUED | OUTPATIENT
Start: 2019-05-08 | End: 2019-05-08 | Stop reason: HOSPADM

## 2019-05-08 RX ORDER — ONDANSETRON 2 MG/ML
INJECTION INTRAMUSCULAR; INTRAVENOUS PRN
Status: DISCONTINUED | OUTPATIENT
Start: 2019-05-08 | End: 2019-05-08 | Stop reason: SDUPTHER

## 2019-05-08 RX ORDER — SODIUM CHLORIDE 0.9 % (FLUSH) 0.9 %
10 SYRINGE (ML) INJECTION EVERY 12 HOURS SCHEDULED
Status: DISCONTINUED | OUTPATIENT
Start: 2019-05-08 | End: 2019-05-08 | Stop reason: HOSPADM

## 2019-05-08 RX ORDER — DIPHENHYDRAMINE HYDROCHLORIDE 50 MG/ML
12.5 INJECTION INTRAMUSCULAR; INTRAVENOUS
Status: DISCONTINUED | OUTPATIENT
Start: 2019-05-08 | End: 2019-05-08 | Stop reason: HOSPADM

## 2019-05-08 RX ORDER — PROPOFOL 10 MG/ML
INJECTION, EMULSION INTRAVENOUS PRN
Status: DISCONTINUED | OUTPATIENT
Start: 2019-05-08 | End: 2019-05-08 | Stop reason: SDUPTHER

## 2019-05-08 RX ORDER — HYDROCODONE BITARTRATE AND ACETAMINOPHEN 5; 325 MG/1; MG/1
2 TABLET ORAL PRN
Status: COMPLETED | OUTPATIENT
Start: 2019-05-08 | End: 2019-05-08

## 2019-05-08 RX ORDER — SODIUM CHLORIDE, SODIUM LACTATE, POTASSIUM CHLORIDE, CALCIUM CHLORIDE 600; 310; 30; 20 MG/100ML; MG/100ML; MG/100ML; MG/100ML
INJECTION, SOLUTION INTRAVENOUS CONTINUOUS
Status: DISCONTINUED | OUTPATIENT
Start: 2019-05-08 | End: 2019-05-08 | Stop reason: HOSPADM

## 2019-05-08 RX ORDER — LIDOCAINE HYDROCHLORIDE 20 MG/ML
INJECTION, SOLUTION EPIDURAL; INFILTRATION; INTRACAUDAL; PERINEURAL PRN
Status: DISCONTINUED | OUTPATIENT
Start: 2019-05-08 | End: 2019-05-08 | Stop reason: SDUPTHER

## 2019-05-08 RX ORDER — HYDROCODONE BITARTRATE AND ACETAMINOPHEN 5; 325 MG/1; MG/1
1 TABLET ORAL PRN
Status: COMPLETED | OUTPATIENT
Start: 2019-05-08 | End: 2019-05-08

## 2019-05-08 RX ORDER — SODIUM CHLORIDE 0.9 % (FLUSH) 0.9 %
10 SYRINGE (ML) INJECTION PRN
Status: DISCONTINUED | OUTPATIENT
Start: 2019-05-08 | End: 2019-05-08 | Stop reason: HOSPADM

## 2019-05-08 RX ORDER — MORPHINE SULFATE 2 MG/ML
2 INJECTION, SOLUTION INTRAMUSCULAR; INTRAVENOUS EVERY 5 MIN PRN
Status: DISCONTINUED | OUTPATIENT
Start: 2019-05-08 | End: 2019-05-08 | Stop reason: HOSPADM

## 2019-05-08 RX ORDER — MIDAZOLAM HYDROCHLORIDE 1 MG/ML
INJECTION INTRAMUSCULAR; INTRAVENOUS PRN
Status: DISCONTINUED | OUTPATIENT
Start: 2019-05-08 | End: 2019-05-08 | Stop reason: SDUPTHER

## 2019-05-08 RX ORDER — FENTANYL CITRATE 50 UG/ML
25 INJECTION, SOLUTION INTRAMUSCULAR; INTRAVENOUS EVERY 5 MIN PRN
Status: DISCONTINUED | OUTPATIENT
Start: 2019-05-08 | End: 2019-05-08 | Stop reason: HOSPADM

## 2019-05-08 RX ORDER — DEXAMETHASONE SODIUM PHOSPHATE 10 MG/ML
10 INJECTION, SOLUTION INTRAMUSCULAR; INTRAVENOUS ONCE
Status: COMPLETED | OUTPATIENT
Start: 2019-05-08 | End: 2019-05-08

## 2019-05-08 RX ORDER — FENTANYL CITRATE 50 UG/ML
50 INJECTION, SOLUTION INTRAMUSCULAR; INTRAVENOUS EVERY 5 MIN PRN
Status: DISCONTINUED | OUTPATIENT
Start: 2019-05-08 | End: 2019-05-08 | Stop reason: HOSPADM

## 2019-05-08 RX ORDER — LIDOCAINE HYDROCHLORIDE 10 MG/ML
1 INJECTION, SOLUTION EPIDURAL; INFILTRATION; INTRACAUDAL; PERINEURAL
Status: DISCONTINUED | OUTPATIENT
Start: 2019-05-08 | End: 2019-05-08 | Stop reason: HOSPADM

## 2019-05-08 RX ORDER — FENTANYL CITRATE 50 UG/ML
INJECTION, SOLUTION INTRAMUSCULAR; INTRAVENOUS PRN
Status: DISCONTINUED | OUTPATIENT
Start: 2019-05-08 | End: 2019-05-08 | Stop reason: SDUPTHER

## 2019-05-08 RX ADMIN — FENTANYL CITRATE 25 MCG: 50 INJECTION, SOLUTION INTRAMUSCULAR; INTRAVENOUS at 09:10

## 2019-05-08 RX ADMIN — HYDROCODONE BITARTRATE AND ACETAMINOPHEN 1 TABLET: 5; 325 TABLET ORAL at 10:11

## 2019-05-08 RX ADMIN — Medication 2 G: at 08:51

## 2019-05-08 RX ADMIN — FENTANYL CITRATE 50 MCG: 50 INJECTION, SOLUTION INTRAMUSCULAR; INTRAVENOUS at 09:08

## 2019-05-08 RX ADMIN — FENTANYL CITRATE 50 MCG: 50 INJECTION, SOLUTION INTRAMUSCULAR; INTRAVENOUS at 08:55

## 2019-05-08 RX ADMIN — MIDAZOLAM 2 MG: 1 INJECTION INTRAMUSCULAR; INTRAVENOUS at 08:51

## 2019-05-08 RX ADMIN — SODIUM CHLORIDE, POTASSIUM CHLORIDE, SODIUM LACTATE AND CALCIUM CHLORIDE: 600; 310; 30; 20 INJECTION, SOLUTION INTRAVENOUS at 07:13

## 2019-05-08 RX ADMIN — ACETAMINOPHEN 1000 MG: 500 TABLET, FILM COATED ORAL at 07:17

## 2019-05-08 RX ADMIN — PROPOFOL 200 MG: 10 INJECTION, EMULSION INTRAVENOUS at 08:53

## 2019-05-08 RX ADMIN — DEXAMETHASONE SODIUM PHOSPHATE 10 MG: 10 INJECTION, SOLUTION INTRAMUSCULAR; INTRAVENOUS at 08:56

## 2019-05-08 RX ADMIN — LIDOCAINE HYDROCHLORIDE 50 MG: 20 INJECTION, SOLUTION EPIDURAL; INFILTRATION; INTRACAUDAL; PERINEURAL at 08:53

## 2019-05-08 RX ADMIN — ONDANSETRON 4 MG: 2 INJECTION INTRAMUSCULAR; INTRAVENOUS at 08:58

## 2019-05-08 RX ADMIN — SODIUM CHLORIDE, POTASSIUM CHLORIDE, SODIUM LACTATE AND CALCIUM CHLORIDE: 600; 310; 30; 20 INJECTION, SOLUTION INTRAVENOUS at 08:51

## 2019-05-08 ASSESSMENT — PULMONARY FUNCTION TESTS
PIF_VALUE: 2
PIF_VALUE: 1
PIF_VALUE: 2
PIF_VALUE: 3
PIF_VALUE: 9
PIF_VALUE: 2
PIF_VALUE: 3
PIF_VALUE: 2
PIF_VALUE: 3
PIF_VALUE: 2
PIF_VALUE: 3
PIF_VALUE: 1
PIF_VALUE: 3
PIF_VALUE: 3
PIF_VALUE: 4
PIF_VALUE: 12
PIF_VALUE: 3
PIF_VALUE: 2
PIF_VALUE: 0
PIF_VALUE: 2
PIF_VALUE: 2
PIF_VALUE: 3
PIF_VALUE: 2
PIF_VALUE: 3
PIF_VALUE: 2
PIF_VALUE: 4
PIF_VALUE: 1
PIF_VALUE: 3
PIF_VALUE: 3

## 2019-05-08 ASSESSMENT — PAIN DESCRIPTION - LOCATION
LOCATION: HAND

## 2019-05-08 ASSESSMENT — PAIN SCALES - GENERAL
PAINLEVEL_OUTOF10: 5
PAINLEVEL_OUTOF10: 4
PAINLEVEL_OUTOF10: 0
PAINLEVEL_OUTOF10: 7
PAINLEVEL_OUTOF10: 5
PAINLEVEL_OUTOF10: 7
PAINLEVEL_OUTOF10: 7

## 2019-05-08 ASSESSMENT — PAIN DESCRIPTION - DESCRIPTORS
DESCRIPTORS: ACHING;NUMBNESS
DESCRIPTORS: ACHING

## 2019-05-08 ASSESSMENT — PAIN DESCRIPTION - ORIENTATION
ORIENTATION: RIGHT

## 2019-05-08 ASSESSMENT — PAIN DESCRIPTION - PAIN TYPE
TYPE: SURGICAL PAIN

## 2019-05-08 ASSESSMENT — PAIN - FUNCTIONAL ASSESSMENT: PAIN_FUNCTIONAL_ASSESSMENT: 0-10

## 2019-05-08 NOTE — ANESTHESIA PRE PROCEDURE
Department of Anesthesiology  Preprocedure Note       Name:  Lauro Berrioscel   Age:  26 y.o.  :  1985                                          MRN:  [id number]         Date:  2019      Surgeon: Patrica Gutierrez):  Melba Sears MD    Procedure: CARPAL TUNNEL RELEASE (Right Hand)    Medications prior to admission:   Prior to Admission medications    Medication Sig Start Date End Date Taking? Authorizing Provider   HYDROcodone-acetaminophen (NORCO) 5-325 MG per tablet Take 1 tablet by mouth every 4 hours for 42 doses.  5/3/19 5/10/19 Yes Melba Sears MD   Cholecalciferol (VITAMIN D) 2000 units TABS tablet Take 1 tablet by mouth daily 3/1/19  Yes Melva Cifuentes MD   ondansetron (ZOFRAN) 4 MG tablet Take 1 tablet by mouth daily as needed for Nausea or Vomiting 5/3/19   Melba Sears MD   DULoxetine (CYMBALTA) 30 MG extended release capsule Take 1 capsule by mouth daily  Patient taking differently: Take 30 mg by mouth every evening  19   Melva Cifuentes MD   naproxen (NAPROSYN) 500 MG tablet Take 1 tablet by mouth 2 times daily (with meals)  Patient taking differently: Take 500 mg by mouth 2 times daily (with meals) Indications: as needed  18   Melva Cifuentes MD   ibuprofen (ADVIL;MOTRIN) 800 MG tablet Take 1 tablet by mouth every 8 hours as needed for Pain or Fever 18   Rosetta Hancock DO       Current medications:    Current Facility-Administered Medications   Medication Dose Route Frequency Provider Last Rate Last Dose    lactated ringers infusion   Intravenous Continuous Nolberto Nagel  mL/hr at 19 0713      lidocaine PF 1 % injection 1 mL  1 mL Intradermal Once PRN Nolberto Nagel MD        sodium chloride flush 0.9 % injection 10 mL  10 mL Intravenous 2 times per day Nolberto Nagel MD        sodium chloride flush 0.9 % injection 10 mL  10 mL Intravenous PRN Nolberto Nagel MD        ceFAZolin (ANCEF) 2 g in dextrose 5 % 50 mL IVPB  2 g Intravenous On Call to Riana Cerna MD  dexamethasone (PF) (DECADRON) injection 10 mg  10 mg Intravenous Once Madelin Mahmood MD        sodium chloride flush 0.9 % injection 10 mL  10 mL Intravenous 2 times per day Madelin Mahmood MD        sodium chloride flush 0.9 % injection 10 mL  10 mL Intravenous PRN Madelin Mahmood MD           Allergies:  No Known Allergies    Problem List:    Patient Active Problem List   Diagnosis Code    Fibromyalgia M79.7    Anxiety F41.9    DDD (degenerative disc disease), lumbar M51.36    Vitamin D deficiency E55.9    Obesity  O99.213    Acute blood loss anemia D64.9    Right carpal tunnel syndrome G56.01    Chronic midline low back pain with right-sided sciatica M54.41, G89.29       Past Medical History:        Diagnosis Date    Acute blood loss anemia 2018    after c section    Anxiety     Chronic back pain     degenerative discs    DDD (degenerative disc disease), lumbar     L4-5 ; history of injections     Depression     Fibromyalgia     Headache     LGSIL of cervix of undetermined significance 2017    HPV-       Past Surgical History:        Procedure Laterality Date    INSERTION OF CONTRACEPTIVE CAPSULE Left 2018    Nexplanon    [place]  DELIVERY ONLY N/A 2018     [facility]    WISDOM TOOTH EXTRACTION         Social History:    Social History     Tobacco Use    Smoking status: Former Smoker     Packs/day: 0.50     Years: 15.00     Pack years: 7.50     Last attempt to quit: 2015     Years since quitting: 3.6    Smokeless tobacco: Never Used   Substance Use Topics    Alcohol use:  No                                Counseling given: Not Answered      Vital Signs (Current):   Vitals:    19 0654   BP: 117/81   Pulse: 100   Resp: 18   Temp: 97.5 °F (36.4 °C)   TempSrc: Oral   SpO2: 98%   Weight: 222 lb (100.7 kg)   Height: 4' 11\" (1.499 m)                                              BP Readings from Last 3 Encounters:   05/08/19 117/81   04/29/19 119/84   02/28/19 124/85       NPO Status: Time of last liquid consumption: 2230                        Time of last solid consumption: 2130                        Date of last liquid consumption: 05/07/19                        Date of last solid food consumption: 05/07/19    BMI:   Wt Readings from Last 3 Encounters:   05/08/19 222 lb (100.7 kg)   04/29/19 222 lb (100.7 kg)   04/22/19 222 lb 14.2 oz (101.1 kg)     Body mass index is 44.84 kg/m². CBC:   Lab Results   Component Value Date    WBC 11.9 04/29/2019    RBC 4.75 04/29/2019    HGB 13.3 04/29/2019    HCT 40.5 04/29/2019    MCV 85.3 04/29/2019    RDW 14.8 04/29/2019     04/29/2019       CMP:   Lab Results   Component Value Date     06/22/2018    K 3.9 06/22/2018     06/22/2018    CO2 18 06/22/2018    BUN 6 06/22/2018    CREATININE 0.51 06/22/2018    GFRAA >60 06/22/2018    LABGLOM >60 06/22/2018    GLUCOSE 117 06/22/2018    PROT 6.0 06/22/2018    CALCIUM 8.1 06/22/2018    BILITOT 0.19 06/22/2018    ALKPHOS 159 06/22/2018    AST 15 06/22/2018    ALT 7 06/22/2018       POC Tests: No results for input(s): POCGLU, POCNA, POCK, POCCL, POCBUN, POCHEMO, POCHCT in the last 72 hours.     Coags: No results found for: PROTIME, INR, APTT    HCG (If Applicable):   Lab Results   Component Value Date    HCG NEGATIVE 05/08/2019    HCGQUANT 1,764 (H) 10/26/2017        ABGs: No results found for: PHART, PO2ART, QZP7GEY, YOF9SOL, BEART, K7AHCQAH     Type & Screen (If Applicable):  No results found for: LABABO, [address]    Anesthesia Evaluation  Patient summary reviewed and Nursing notes reviewed no history of anesthetic complications:   Airway: Mallampati: II  TM distance: >3 FB   Neck ROM: full  Mouth opening: > = 3 FB Dental: normal exam         Pulmonary:Negative Pulmonary ROS and normal exam                               Cardiovascular:Negative CV ROS          ECG reviewed  Rhythm: regular  Rate: normal

## 2019-05-08 NOTE — H&P
HISTORY and [address]       NAME:  Jazmine Tarango  MRN: [id number]   YOB: 1985   Date: 5/8/2019   Age: 26 y.o. Gender: female     H&P Update Note    H&P from 04/29/2019 reviewed and updated. Patient examined. INTERVAL HISTORY:     Patient is feeling well today, denies any fever/chills, chest pain, shortness of breath. No interval changes. No interval changes to past medical history, social history, family history. Review of systems as stated above and otherwise negative. PHYSICAL EXAM:     Vitals: /81   Pulse 100   Temp 97.5 °F (36.4 °C) (Oral)   Resp 18   Ht 4' 11\" (1.499 m)   Wt 222 lb (100.7 kg)   SpO2 98%   BMI 44.84 kg/m²  Body mass index is 44.84 kg/m². Patient is alert and oriented, in no distress. Normotensive. Heart rate and rhythm are regular. Lungs clear to auscultation bilaterally. Abdomen is soft, non tender. No pedal edema. No interval changes. I concur with the findings. COURTNEY VIDALES CNP on 5/8/2019 at 7:10 AM    COURTNEY Tellez CNP   Advanced Practice Nurse   Internal Medicine   H&P   Signed   Date of Service:  4/29/2019 12:33 PM          Related encounter: Pre-Admission Testing Visit from 4/29/2019 in [address] [place] [address] All Collapse All            Show:Clear all  [x]Manual[x]Template[x]Copied    Added by:  [x]COURTNEY Ghotra CNP      []Harrison for details            [facility]         NAME:  Jazmine Tarango  MRN: [id number]   YOB: 1985   Date: 4/29/2019   Age: 26 y.o. Gender: female         COMPLAINT AND PRESENT HISTORY:      Jazmine Tarango is 26 y.o.,,  female, undergoing preadmission testing for Carpal Tunnel Syndrome in right wrist. Patient had EMG/Nerve conduction test that showed moderate results. Patient will be having Right Carpal Tunnel Release.    Pt C/O of wrist pain that radiates to the hand and distal forearm. Patient also has weakness, numbness and tingling in the thumb index and middle fingers. Patient has more nocturnal discomfort. Patient describes her pain as aching and burning. Patient present with greater than **hx of numbness and tingling.    Pt has been using a brace with mild  improvement of the symptoms.     PAST MEDICAL HISTORY      Past Medical History        Past Medical History:   Diagnosis Date    Acute blood loss anemia 2018     after c section    Anxiety      Chronic back pain       degenerative discs    DDD (degenerative disc disease), lumbar       L4-5 ; history of injections     Depression      Fibromyalgia      Headache      LGSIL of cervix of undetermined significance 2017     HPV-         Pt denies any history of Diabetes mellitus type 2, hypertension, stroke, heart disease, COPD, Asthma, GERD, HLD, Cancer, Seizures,Thyroid disease, Kidney Disease, Hepatitis, TB or Substance abuse.     SURGICAL HISTORY        Past Surgical History   Past Surgical History:   Procedure Laterality Date    INSERTION OF CONTRACEPTIVE CAPSULE Left 2018     Nexplanon    [place]  DELIVERY ONLY N/A 2018      [facility]    WISDOM TOOTH EXTRACTION                FAMILY HISTORY        Family History   Family History   Problem Relation Age of Onset    High Blood Pressure Mother      Other Mother      Breast Cancer Maternal Grandmother      Depression Paternal Grandmother      Stroke Brother           age 29    Other Brother           Leiden factor V             SOCIAL HISTORY        Social History   Social History            Socioeconomic History    Marital status:        Spouse name: None    Number of children: None    Years of education: None    Highest education level: None   Occupational History    None   Social Needs    Financial resource strain: None    Food insecurity:       Worry: None       Inability: None    Transportation needs:       Medical: None       Non-medical: None   Tobacco Use    Smoking status: Former Smoker       Packs/day: 0.50       Years: 15.00       Pack years: 7.50       Last attempt to quit: 9/29/2015       Years since quitting: 3.5    Smokeless tobacco: Never Used   Substance and Sexual Activity    Alcohol use: No    Drug use: No    Sexual activity: Yes       Partners: Male       Birth control/protection: Implant   Lifestyle    Physical activity:       Days per week: None       Minutes per session: None    Stress: None   Relationships    Social connections:       Talks on phone: None       Gets together: None       Attends Yarsani service: None       Active member of club or organization: None       Attends meetings of clubs or organizations: None       Relationship status: None    Intimate partner violence:       Fear of current or ex partner: None       Emotionally abused: None       Physically abused: None       Forced sexual activity: None   Other Topics Concern    None   Social History Narrative    None               REVIEW OF SYSTEMS       No Known Allergies            Current Outpatient Medications on File Prior to Encounter   Medication Sig Dispense Refill    Cholecalciferol (VITAMIN D) 2000 units TABS tablet Take 1 tablet by mouth daily 90 tablet 3    DULoxetine (CYMBALTA) 30 MG extended release capsule Take 1 capsule by mouth daily (Patient taking differently: Take 30 mg by mouth every evening ) 30 capsule 1    ibuprofen (ADVIL;MOTRIN) 800 MG tablet Take 1 tablet by mouth every 8 hours as needed for Pain or Fever 60 tablet 0    naproxen (NAPROSYN) 500 MG tablet Take 1 tablet by mouth 2 times daily (with meals) (Patient taking differently: Take 500 mg by mouth 2 times daily (with meals) Indications: as needed ) 60 tablet 3                Current Facility-Administered Medications on File Prior to Encounter   Medication Dose Route Frequency Provider Last No localized tenderness. No guarding or rigidity. No palpable hepatosplenomegaly. LYMPHATICS:  No palpable cervical lymphadenopathy.      LOCOMOTOR, BACK AND SPINE:  No tenderness or deformities. EXTREMITIES:  Symmetrical, no pretibial edema. Fifis sign negative. No discoloration or ulcerations. Positive Phalens Test and Tinels Sign on the right wrist.     NEUROLOGIC:  The patient is conscious, alert, oriented,Cranial nerve II-XII intact, taste was not examined. No apparent focal sensory or motor deficits.                                                                                      PROVISIONAL DIAGNOSES / SURGERY:       CARPAL TUNNEL SYNDROME     CARPAL TUNNEL RELEASE             Right          Patient Active Problem List     Diagnosis Date Noted    Right carpal tunnel syndrome 11/01/2018    Chronic midline low back pain with right-sided sciatica 11/01/2018    Acute blood loss anemia 06/22/2018    Obesity  04/30/2018    Vitamin D deficiency 10/31/2017    Fibromyalgia      Anxiety      DDD (degenerative disc disease), lumbar              FEROZ BRIAN OSPINA, APRN - CNP on 4/29/2019 at 2:58 PM                   Cosigned by: Jaycee Goodell, MD at 5/1/2019  2:16 PM

## 2019-05-08 NOTE — H&P
HISTORY and [facility] [address]       NAME:  Barney Goodrich  MRN: [id number]   YOB: 1985   Date: 10/23/2019   Age: 37 y.o. Gender: female     H&P Update Note    H&P from 04/29/2019 reviewed and updated. Patient examined. INTERVAL HISTORY:     Patient is feeling well today, denies any fever/chills, chest pain, shortness of breath. No interval changes. No interval changes to past medical history, social history, family history. Review of systems as stated above and otherwise negative. PHYSICAL EXAM:     Vitals: /72   Pulse 72   Temp 97.2 °F (36.2 °C) (Oral)   Resp 16   Ht 4' 11\" (1.499 m)   Wt 222 lb (100.7 kg)   SpO2 98%   BMI 44.84 kg/m²  Body mass index is 44.84 kg/m². Patient is alert and oriented, in no distress. Normotensive. Heart rate and rhythm are regular. Lungs clear to auscultation bilaterally. Abdomen is soft, non tender. No pedal edema. No interval changes. I concur with the findings. COURTNEY VIDALES CNP on 10/23/2019 at 9:06 AM    COURTNEY Marquez CNP   Advanced Practice Nurse   Internal Medicine   H&P   Signed   Date of Service:  4/29/2019 12:33 PM          Related encounter: Pre-Admission Testing Visit from 4/29/2019 in [address] [place] [address]                 Show:Clear all  [x]Manual[x]Template[x]Copied    Added by:  [x]COURTNEY De La Rosa CNP      []Harrison for details            HISTORY and [address]         NAME:  Barney Goodrich  MRN: [id number]   YOB: 1985   Date: 4/29/2019   Age: 26 y.o. Gender: female         COMPLAINT AND PRESENT HISTORY:      Barney Goodrich is 26 y.o.,,  female, undergoing preadmission testing for Carpal Tunnel Syndrome in right wrist. Patient had EMG/Nerve conduction test that showed moderate results. Patient will be having Right Carpal Tunnel Release. Pt C/O of wrist pain that radiates to the hand and distal forearm. - CNP   68 mg at 09/11/18 1153         General health:  Fairly good. No fever or chills. Skin:  No itching, redness or rash. HEENT:  No headache, epistaxis or sore throat. Neck:  No pain, stiffness or masses. Cardiovascular/Respiratory system:  No chest pain, palpitation or shortness of breath. Gastrointestinal tract: No abdominal pain, nausea, vomiting, diarrhea or constipation. Genitourinary:  No burning on micturition. No hesitancy, urgency, frequency or discoloration of urine. Locomotor:  See HPI. Neuropsychiatric:  No referable complaints.                     GENERAL PHYSICAL EXAM:      Vitals: /84   Pulse 91   Temp 98.6 °F (37 °C) (Oral)   Resp 16   Ht 4' 11.49\" (1.511 m)   Wt 222 lb (100.7 kg)   SpO2 98%   BMI 44.10 kg/m²  Body mass index is 44.1 kg/m².         GENERAL APPEARANCE:   Gabbi Sánchez is 26 y.o., ,  female, moderately obese, nourished, conscious, alert. Does not appear to be distress or pain at this time. SKIN:  Warm, dry, no cyanosis or jaundice. HEAD:  Normocephalic, atraumatic, no swelling or tenderness. EYES:  Pupils equal, reactive to light. EARS:  No discharge, no marked hearing loss. NOSE:  No rhinorrhea, epistaxis or septal deformity. THROAT:  Not congested. No ulceration bleeding or discharge. NECK:  No stiffness, trachea central.  No palpable masses or L.N.                 CHEST:  Symmetrical and equal on expansion. HEART:  RRR S1 > S2. No audible murmurs or gallops. LUNGS:  Equal on expansion, normal breath sounds. No adventitious sounds. ABDOMEN:  Obese. Soft on palpation. No localized tenderness. No guarding or rigidity. No palpable hepatosplenomegaly.               LYMPHATICS: No palpable cervical lymphadenopathy.      LOCOMOTOR, BACK AND SPINE:  No tenderness or deformities. EXTREMITIES:  Symmetrical, no pretibial edema. Fifis sign negative. No discoloration or ulcerations. Positive Phalens Test and Tinels Sign on the right wrist.     NEUROLOGIC:  The patient is conscious, alert, oriented,Cranial nerve II-XII intact, taste was not examined. No apparent focal sensory or motor deficits.                                                                                      PROVISIONAL DIAGNOSES / SURGERY:       CARPAL TUNNEL SYNDROME     CARPAL TUNNEL RELEASE             Right          Patient Active Problem List     Diagnosis Date Noted    Right carpal tunnel syndrome 11/01/2018    Chronic midline low back pain with right-sided sciatica 11/01/2018    Acute blood loss anemia 06/22/2018    Obesity  04/30/2018    Vitamin D deficiency 10/31/2017    Fibromyalgia      Anxiety      DDD (degenerative disc disease), lumbar              FEROZ OSPINA, COURTNEY - CNP on 4/29/2019 at 2:58 PM                   Cosigned by: Heidi Cook MD at 5/1/2019  2:16 PM   Revision History                                    Routing History

## 2019-05-08 NOTE — BRIEF OP NOTE
Brief Postoperative Note  ______________________________________________________________    Patient: Sharif Ng  YOB: 1985  MRN: [id number]  Date of Procedure: 5/8/2019    Pre-Op Diagnosis: RIGHT CARPAL TUNNEL SYNDROME    Post-Op Diagnosis: Same       Procedure(s):  CARPAL TUNNEL RELEASE    Anesthesia: General    Surgeon(s):  Rea Brown MD    Assistant: Mary Richardson    Estimated Blood Loss (mL): Minimal    Complications: None    Specimens:   * No specimens in log *    Implants:  * No implants in log *      Drains: * No LDAs found *    Findings: See operative report    Rea Brown MD  Date: 5/8/2019  Time: 9:26 AM

## 2019-05-08 NOTE — OP NOTE
OPERATIVE REPORT     Date of Surgery: 5/8/2019     Pre-operative Diagnosis: Right carpal tunnel syndrome (G56.01)    Post-operative Diagnosis: Right carpal tunnel syndrome (G56.01)    Procedure: Open Right carpal tunnel release (16123)    Surgeon(s): Dulce Romberg MD     Assistant(s): Fátima Raygoza DO (PGY 4)     Anesthesia: General with right interscalene nerve block     Fluids: See anesthesia record     Urine output: See anesthesia record     Estimated blood loss: Minimal     Findings: See below     Specimen: None     Tourniquet time: 15 minutes       Surgical Indications: Hank Trevizo is a 26 y.o. old female who presented with right carpal tunnel syndrome who failed conservative management. Following a discussion with the patient regarding both non-operative and operative treatment options, she elected and consented to proceed with an open right carpal tunnel release. she came to this decision after demonstrating an understanding of our discussion regarding details of the procedure, risks and benefits, expected outcome, and postoperative course. Operative technique: Following appropriate identification of the patient and her operative extremity, consent was reviewed with the patient and her operative extremity was signed. she was wheeled to the operating room where she finished a course of pre-operative antibiotic prophylaxis by way of 2 G IV ancef. The anesthesia administered a general anesthetic and secured her airway. All bony prominences were appropriately padded and the patient was secured to the operative table in a supine position. A well padded pneumatic tourniquet was applied to her right upper arm. The patient's operative extremity was prepped and draped in a standard sterile fashion and a time out was performed during which the correct patient, operative extremity, and procedure were verified.  A longitudinal incision ulnar to her thenar crease was made through skin and blunt dissection was carried down to the level of the palmar fascia which was incised sharply to expose the transverse carpal ligament which in turn was incised in longitudinal fashion in line with the skin incision first to its distal extent stopping once the fat pad was reached indicating the distal extent of the tunnel. The proximal extent of the transverse carpal ligament and distal forearm fascia was bluntly dissected and under direct visualization released along its length taking care to avoid injury to any traversing nerve branches. The carpal tunnel was bluntly probed to ensure complete release of the transverse carpal ligament and distal forearm fascia. Satisfied with the release the patient's incision was thoroughly irrigated and incision site infiltrated with approximately 10 cc of 0.5% marcaine without epi. The patient's incision was then closed using 3-0 nylon sutures. Sterile dressings were then applied using Xeroform, 4 x 4 gauze and Tegaderm. A compressive Ace wrap was also applied. The tourniquet was deflated after total time of 15 minutes. The patient was awoken transferred to her bed and wheeled to recovery in stable condition.      Complications: None     Post-operative Condition: Stable

## 2019-05-08 NOTE — ANESTHESIA POSTPROCEDURE EVALUATION
POST- ANESTHESIA EVALUATION       Pt Name: Miles Chen  MRN: [id number]  YOB: 1985  Date of evaluation: 5/8/2019  Time:  2:14 PM      /72   Pulse 72   Temp 97.2 °F (36.2 °C) (Oral)   Resp 16   Ht 4' 11\" (1.499 m)   Wt 222 lb (100.7 kg)   SpO2 98%   BMI 44.84 kg/m²      Consciousness Level  Awake  Cardiopulmonary Status  Stable  Pain Adequately Treated YES  Nausea / Vomiting  NO  Adequate Hydration  YES  Anesthesia Related Complications NONE      Electronically signed by Delgado Redd MD on 5/8/2019 at 2:14 PM       Department of Anesthesiology  Postprocedure Note    Patient: Miles Chen  MRN: [id number]  YOB: 1985  Date of evaluation: 5/8/2019  Time:  2:13 PM     Procedure Summary     Date:  05/08/19 Room / Location:  [address] [place] [address] [address] / [address]    Anesthesia Start:  0851 Anesthesia Stop:  0929    Procedure:  CARPAL TUNNEL RELEASE (Right Hand) Diagnosis:  (RIGHT CARPAL TUNNEL SYNDROME)    Surgeon:  Mary Love MD Responsible Provider:  Delgado Redd MD    Anesthesia Type:  general ASA Status:  2          Anesthesia Type: general    Asa Phase I: Asa Score: 10    Asa Phase II: Asa Score: 10    Last vitals: Reviewed and per EMR flowsheets.        Anesthesia Post Evaluation

## 2019-05-23 ENCOUNTER — OFFICE VISIT (OUTPATIENT)
Dept: ORTHOPEDIC SURGERY | Age: 34
End: 2019-05-23

## 2019-05-23 VITALS — HEIGHT: 59 IN | WEIGHT: 220 LBS | BODY MASS INDEX: 44.35 KG/M2

## 2019-05-23 DIAGNOSIS — G56.01 RIGHT CARPAL TUNNEL SYNDROME: Primary | ICD-10-CM

## 2019-05-23 PROCEDURE — 99024 POSTOP FOLLOW-UP VISIT: CPT | Performed by: ORTHOPAEDIC SURGERY

## 2019-05-23 NOTE — PROGRESS NOTES
Procedure: Right open carpal tunnel release  Date of procedure: 5/8/19    HPI: Ms. Ziggy Alfaro is a 55-year-old who is approximately 2 weeks status post the aforementioned procedure. She states that she is doing relatively well. She has minimal pain rating it as a 2/10 when she overdoes it. She in reports improvement in her numbness and tingling. At this time she reports that she has only mild residual numbness at the tips of her thumb and index finger. In fact with regards to the thumb she states that that has resolved at this point. She denies having any fevers chills or sweats. Physical examination:  Evaluation of her right hand and upper extremity demonstrates her incision to be clean, dry, intact without any wound dehiscence or drainage. No warmth, erythema, or swelling present. Sensation is grossly intact light touch in all dermatomes with mild altered sensation at the tip of her index finger. She has a 2+ radial pulse with brisk capillary refill in her fingers. Impression and plan: Ms. Ziggy Alfaro is a 55-year-old 2 weeks status post an open right carpal tunnel release. She is doing quite well at this time. Her stitches were taken out and Steri-Strips/clean dressings applied. She may now get her incision wet in the shower. She is to continue to maintain a 1 pound restriction on this arm. She may otherwise use it for light activities of daily living. I anticipate continued improvement and complete resolution of any numbness she currently has. We will continue to monitor. I'll have her follow up for reevaluation in 4 weeks but she was encouraged to return or call earlier with questions and/or concerns.

## 2023-03-07 PROBLEM — E66.01 MORBID OBESITY WITH BODY MASS INDEX (BMI) OF 40.0 OR HIGHER (MULTI): Status: ACTIVE | Noted: 2023-03-07

## 2023-03-07 PROBLEM — D72.829 LEUKOCYTOSIS: Status: ACTIVE | Noted: 2023-03-07

## 2023-03-07 PROBLEM — R73.03 PREDIABETES: Status: ACTIVE | Noted: 2023-03-07

## 2023-03-07 PROBLEM — G89.29 BACK PAIN, CHRONIC: Status: ACTIVE | Noted: 2023-03-07

## 2023-03-07 PROBLEM — G47.00 INSOMNIA: Status: ACTIVE | Noted: 2023-03-07

## 2023-03-07 PROBLEM — M79.7 FIBROMYALGIA: Status: ACTIVE | Noted: 2023-03-07

## 2023-03-07 PROBLEM — M54.9 BACK PAIN, CHRONIC: Status: ACTIVE | Noted: 2023-03-07

## 2023-03-07 PROBLEM — E03.9 HYPOTHYROID: Status: ACTIVE | Noted: 2023-03-07

## 2023-03-07 PROBLEM — R41.840 CONCENTRATION DEFICIT: Status: ACTIVE | Noted: 2023-03-07

## 2023-03-07 PROBLEM — E55.9 VITAMIN D DEFICIENCY: Status: ACTIVE | Noted: 2023-03-07

## 2023-03-07 PROBLEM — M51.369 BULGING LUMBAR DISC: Status: ACTIVE | Noted: 2023-03-07

## 2023-03-07 PROBLEM — F41.9 ANXIETY: Status: ACTIVE | Noted: 2023-03-07

## 2023-03-07 PROBLEM — R87.610 ASCUS WITH POSITIVE HIGH RISK HPV CERVICAL: Status: ACTIVE | Noted: 2023-03-07

## 2023-03-07 PROBLEM — M25.50 ARTHRALGIA: Status: ACTIVE | Noted: 2023-03-07

## 2023-03-07 PROBLEM — M54.2 CERVICAL PAIN: Status: ACTIVE | Noted: 2023-03-07

## 2023-03-07 PROBLEM — F33.0 MILD EPISODE OF RECURRENT MAJOR DEPRESSIVE DISORDER (CMS-HCC): Status: ACTIVE | Noted: 2023-03-07

## 2023-03-07 PROBLEM — R87.810 ASCUS WITH POSITIVE HIGH RISK HPV CERVICAL: Status: ACTIVE | Noted: 2023-03-07

## 2023-03-07 PROBLEM — G43.009 MIGRAINE WITHOUT AURA AND WITHOUT STATUS MIGRAINOSUS, NOT INTRACTABLE: Status: ACTIVE | Noted: 2023-03-07

## 2023-03-07 PROBLEM — M51.36 BULGING LUMBAR DISC: Status: ACTIVE | Noted: 2023-03-07

## 2023-03-07 PROBLEM — G47.30 SLEEP APNEA: Status: ACTIVE | Noted: 2023-03-07

## 2023-03-07 PROBLEM — Z87.39 H/O DEGENERATIVE DISC DISEASE: Status: ACTIVE | Noted: 2023-03-07

## 2023-03-07 RX ORDER — DULOXETIN HYDROCHLORIDE 60 MG/1
1 CAPSULE, DELAYED RELEASE ORAL 2 TIMES DAILY
COMMUNITY
Start: 2021-09-10 | End: 2023-09-07 | Stop reason: ALTCHOICE

## 2023-03-07 RX ORDER — ORPHENADRINE CITRATE 100 MG/1
1 TABLET, EXTENDED RELEASE ORAL 2 TIMES DAILY
COMMUNITY
Start: 2022-04-04 | End: 2023-10-29 | Stop reason: ALTCHOICE

## 2023-03-07 RX ORDER — TRAZODONE HYDROCHLORIDE 50 MG/1
TABLET ORAL
COMMUNITY
Start: 2022-10-26 | End: 2024-05-18 | Stop reason: ALTCHOICE

## 2023-03-07 RX ORDER — HYDROXYZINE PAMOATE 50 MG/1
CAPSULE ORAL
COMMUNITY
Start: 2022-11-16

## 2023-03-07 RX ORDER — GABAPENTIN 100 MG/1
1 CAPSULE ORAL 2 TIMES DAILY
COMMUNITY
Start: 2022-04-13 | End: 2023-05-03 | Stop reason: ALTCHOICE

## 2023-03-07 RX ORDER — CHOLECALCIFEROL (VITAMIN D3) 25 MCG
1 TABLET ORAL DAILY
COMMUNITY
Start: 2021-08-12 | End: 2023-10-29 | Stop reason: ALTCHOICE

## 2023-03-07 RX ORDER — MULTIVITAMIN
1 TABLET ORAL DAILY
COMMUNITY
Start: 2021-08-12

## 2023-03-07 RX ORDER — LEVOTHYROXINE SODIUM 75 UG/1
1 TABLET ORAL DAILY
COMMUNITY
Start: 2022-10-26 | End: 2023-11-08

## 2023-03-07 RX ORDER — MELOXICAM 15 MG/1
TABLET ORAL
COMMUNITY
Start: 2022-07-26 | End: 2023-10-29 | Stop reason: ALTCHOICE

## 2023-03-09 ENCOUNTER — APPOINTMENT (OUTPATIENT)
Dept: PRIMARY CARE | Facility: CLINIC | Age: 38
End: 2023-03-09
Payer: COMMERCIAL

## 2023-04-02 PROBLEM — R73.03 PREDIABETES: Status: RESOLVED | Noted: 2023-03-07 | Resolved: 2023-04-02

## 2023-04-02 PROBLEM — D72.829 LEUKOCYTOSIS: Status: RESOLVED | Noted: 2023-03-07 | Resolved: 2023-04-02

## 2023-05-02 ASSESSMENT — ENCOUNTER SYMPTOMS
CONSTITUTIONAL NEGATIVE: 1
WHEEZING: 0
SHORTNESS OF BREATH: 0

## 2023-05-03 ENCOUNTER — OFFICE VISIT (OUTPATIENT)
Dept: PRIMARY CARE | Facility: CLINIC | Age: 38
End: 2023-05-03
Payer: COMMERCIAL

## 2023-05-03 ENCOUNTER — TELEPHONE (OUTPATIENT)
Dept: PRIMARY CARE | Facility: CLINIC | Age: 38
End: 2023-05-03

## 2023-05-03 VITALS
TEMPERATURE: 98.2 F | OXYGEN SATURATION: 95 % | HEART RATE: 90 BPM | WEIGHT: 223 LBS | DIASTOLIC BLOOD PRESSURE: 81 MMHG | BODY MASS INDEX: 43.78 KG/M2 | SYSTOLIC BLOOD PRESSURE: 113 MMHG | HEIGHT: 60 IN

## 2023-05-03 DIAGNOSIS — M79.7 FIBROMYALGIA: ICD-10-CM

## 2023-05-03 DIAGNOSIS — E66.01 MORBID OBESITY WITH BODY MASS INDEX (BMI) OF 40.0 OR HIGHER (MULTI): ICD-10-CM

## 2023-05-03 DIAGNOSIS — G89.29 CHRONIC BILATERAL LOW BACK PAIN, UNSPECIFIED WHETHER SCIATICA PRESENT: ICD-10-CM

## 2023-05-03 DIAGNOSIS — L03.012 CELLULITIS OF LEFT RING FINGER: ICD-10-CM

## 2023-05-03 DIAGNOSIS — M54.50 CHRONIC BILATERAL LOW BACK PAIN, UNSPECIFIED WHETHER SCIATICA PRESENT: ICD-10-CM

## 2023-05-03 DIAGNOSIS — R73.03 PREDIABETES: Primary | ICD-10-CM

## 2023-05-03 LAB — POC HEMOGLOBIN A1C: 5.1 % (ref 4.2–6.5)

## 2023-05-03 PROCEDURE — 1036F TOBACCO NON-USER: CPT | Performed by: NURSE PRACTITIONER

## 2023-05-03 PROCEDURE — 3008F BODY MASS INDEX DOCD: CPT | Performed by: NURSE PRACTITIONER

## 2023-05-03 PROCEDURE — 99214 OFFICE O/P EST MOD 30 MIN: CPT | Performed by: NURSE PRACTITIONER

## 2023-05-03 PROCEDURE — 83036 HEMOGLOBIN GLYCOSYLATED A1C: CPT | Performed by: NURSE PRACTITIONER

## 2023-05-03 RX ORDER — GABAPENTIN 300 MG/1
300 CAPSULE ORAL 3 TIMES DAILY
Qty: 90 CAPSULE | Refills: 5 | Status: SHIPPED | OUTPATIENT
Start: 2023-05-03 | End: 2023-07-07 | Stop reason: SDUPTHER

## 2023-05-03 RX ORDER — ETONOGESTREL 68 MG/1
IMPLANT SUBCUTANEOUS
COMMUNITY
Start: 2022-11-29

## 2023-05-03 RX ORDER — ERGOCALCIFEROL 1.25 MG/1
50000 CAPSULE ORAL
COMMUNITY
Start: 2020-01-24 | End: 2023-11-20 | Stop reason: SDUPTHER

## 2023-05-03 RX ORDER — FLUCONAZOLE 150 MG/1
150 TABLET ORAL ONCE
Qty: 1 TABLET | Refills: 0 | Status: SHIPPED | OUTPATIENT
Start: 2023-05-03 | End: 2023-05-03

## 2023-05-03 RX ORDER — SULFAMETHOXAZOLE AND TRIMETHOPRIM 800; 160 MG/1; MG/1
1 TABLET ORAL 2 TIMES DAILY
Qty: 20 TABLET | Refills: 0 | Status: SHIPPED | OUTPATIENT
Start: 2023-05-03 | End: 2023-05-13

## 2023-05-03 ASSESSMENT — ANXIETY QUESTIONNAIRES
2. NOT BEING ABLE TO STOP OR CONTROL WORRYING: MORE THAN HALF THE DAYS
6. BECOMING EASILY ANNOYED OR IRRITABLE: MORE THAN HALF THE DAYS
5. BEING SO RESTLESS THAT IT IS HARD TO SIT STILL: NEARLY EVERY DAY
IF YOU CHECKED OFF ANY PROBLEMS ON THIS QUESTIONNAIRE, HOW DIFFICULT HAVE THESE PROBLEMS MADE IT FOR YOU TO DO YOUR WORK, TAKE CARE OF THINGS AT HOME, OR GET ALONG WITH OTHER PEOPLE: VERY DIFFICULT
7. FEELING AFRAID AS IF SOMETHING AWFUL MIGHT HAPPEN: SEVERAL DAYS
3. WORRYING TOO MUCH ABOUT DIFFERENT THINGS: MORE THAN HALF THE DAYS
GAD7 TOTAL SCORE: 16
1. FEELING NERVOUS, ANXIOUS, OR ON EDGE: NEARLY EVERY DAY
4. TROUBLE RELAXING: NEARLY EVERY DAY

## 2023-05-03 ASSESSMENT — PATIENT HEALTH QUESTIONNAIRE - PHQ9
SUM OF ALL RESPONSES TO PHQ9 QUESTIONS 1 AND 2: 2
5. POOR APPETITE OR OVEREATING: MORE THAN HALF THE DAYS
7. TROUBLE CONCENTRATING ON THINGS, SUCH AS READING THE NEWSPAPER OR WATCHING TELEVISION: NEARLY EVERY DAY
SUM OF ALL RESPONSES TO PHQ9 QUESTIONS 1 AND 2: 6
3. TROUBLE FALLING OR STAYING ASLEEP OR SLEEPING TOO MUCH: NEARLY EVERY DAY
SUM OF ALL RESPONSES TO PHQ QUESTIONS 1-9: 20
10. IF YOU CHECKED OFF ANY PROBLEMS, HOW DIFFICULT HAVE THESE PROBLEMS MADE IT FOR YOU TO DO YOUR WORK, TAKE CARE OF THINGS AT HOME, OR GET ALONG WITH OTHER PEOPLE: VERY DIFFICULT
2. FEELING DOWN, DEPRESSED OR HOPELESS: SEVERAL DAYS
4. FEELING TIRED OR HAVING LITTLE ENERGY: NEARLY EVERY DAY
1. LITTLE INTEREST OR PLEASURE IN DOING THINGS: SEVERAL DAYS
9. THOUGHTS THAT YOU WOULD BE BETTER OFF DEAD, OR OF HURTING YOURSELF: NOT AT ALL
8. MOVING OR SPEAKING SO SLOWLY THAT OTHER PEOPLE COULD HAVE NOTICED. OR THE OPPOSITE, BEING SO FIGETY OR RESTLESS THAT YOU HAVE BEEN MOVING AROUND A LOT MORE THAN USUAL: NOT AT ALL
1. LITTLE INTEREST OR PLEASURE IN DOING THINGS: NEARLY EVERY DAY
6. FEELING BAD ABOUT YOURSELF - OR THAT YOU ARE A FAILURE OR HAVE LET YOURSELF OR YOUR FAMILY DOWN: NEARLY EVERY DAY
2. FEELING DOWN, DEPRESSED OR HOPELESS: NEARLY EVERY DAY

## 2023-05-03 ASSESSMENT — ENCOUNTER SYMPTOMS
ARTHRALGIAS: 1
MYALGIAS: 1

## 2023-05-03 NOTE — PATIENT INSTRUCTIONS
A1C today=5.1  This is the 3 month average of your sugars.  You are in the normal range which is 5.6 or less.    Wegovy, saxenda, contrave, qsymia-check insurance for coverage  [employer] copay card  Contrave $100 at [employer]  Qsymia $100 at [email or web address]  Let me know  Set up nutritionist appt    Antibiotic for finger    Go up to dr ng's office and ask for sleep study results and cpap order    Refer to [facility] pain dr    Refer to spine dr    Increase gabapentin to:  300mg 1 every day x 3d then 1 twice a day x 3d then 1 three times a day  We will call in 2wks to see how your pain is doing    Return in oct for wellness      I will communicate with you via [employer] regarding messages and results. If you need help with this, you can call the support line at [phone].    IT WAS A PLEASURE TO SEE YOU TODAY. THANK YOU FOR CHOOSING US FOR YOUR HEALTHCARE NEEDS.

## 2023-05-03 NOTE — TELEPHONE ENCOUNTER
Pls call pt to see if she got the results for the sleep study and are they going to order the cpap.

## 2023-05-03 NOTE — PROGRESS NOTES
Subjective   Patient ID: Jean-Claude Ronquillo is a 37 y.o. female who presents for Follow-up (Pt states she frustrated from seeing doctors and not getting the help she needs. No energy, in pain feeling down. /Fasting-Yes/Sleep study results-She states she never heard from the dr./Cpap-No/Pulm-No she states it went away/Wouldn't accept her with Dr. Carpio was told to go ml/Ob-gyn -unsure of the name/Last pap and nexplanon results-Yes).  Last physical:10/26/22    Is pt fasting? yes  Did pt get sleep study results? Never heard from dr-pt was asked to call dr ng in nov to get results  Did pt see pulm  re; bronchitis? No sx resolved  Did pt see new pain dr (dr carpio)? Would not see her since she saw dr galeana; told to call [facility]; did not call to make an appt  Did pt see new ob gyn re: last pap results and nexplanon? o'alexander  Poc a1c=5.1  Phq9=21  , gad7=16      HPI  Last labs-10/2022 tsh nl, cmp nl, lipid nl; 4/2022 cbc nl  Sleep study 9/2022 showed moderate sleep apnea  Due for labs- no    Cholesterol   Date Value Ref Range Status   10/26/2022 133 0 - 199 mg/dL Final     Comment:     .      AGE      DESIRABLE   BORDERLINE HIGH   HIGH     0-19 Y     0 - 169       170 - 199     >/= 200    20-24 Y     0 - 189       190 - 224     >/= 225         >24 Y     0 - 199       200 - 239     >/= 240   **All ranges are based on fasting samples. Specific   therapeutic targets will vary based on patient-specific   cardiac risk.  .   Pediatric guidelines reference:Pediatrics 2011, 128(S5).   Adult guidelines reference: NCEP ATPIII Guidelines,     ADA 2001, 258:2486-97  .   Venipuncture immediately after or during the    administration of Metamizole may lead to falsely   low results. Testing should be performed immediately   prior to Metamizole dosing.     04/04/2022 116 0 - 199 mg/dL Final     Comment:     .      AGE      DESIRABLE   BORDERLINE HIGH   HIGH     0-19 Y     0 - 169       170 - 199     >/= 200    20-24 Y     0 - 189       190  - 224     >/= 225         >24 Y     0 - 199       200 - 239     >/= 240   **All ranges are based on fasting samples. Specific   therapeutic targets will vary based on patient-specific   cardiac risk.  .   Pediatric guidelines reference:Pediatrics 2011, 128(S5).   Adult guidelines reference: NCEP ATPIII Guidelines,     ADA 2001, 258:2486-97  .   Venipuncture immediately after or during the    administration of Metamizole may lead to falsely   low results. Testing should be performed immediately   prior to Metamizole dosing.       Triglycerides   Date Value Ref Range Status   10/26/2022 83 0 - 149 mg/dL Final     Comment:     .      AGE      DESIRABLE   BORDERLINE HIGH   HIGH     VERY HIGH   0 D-90 D    19 - 174         ----         ----        ----  91 D- 9 Y     0 -  74        75 -  99     >/= 100      ----    10-19 Y     0 -  89        90 - 129     >/= 130      ----    20-24 Y     0 - 114       115 - 149     >/= 150      ----         >24 Y     0 - 149       150 - 199    200- 499    >/= 500  .   Venipuncture immediately after or during the    administration of Metamizole may lead to falsely   low results. Testing should be performed immediately   prior to Metamizole dosing.     04/04/2022 126 0 - 149 mg/dL Final     Comment:     .      AGE      DESIRABLE   BORDERLINE HIGH   HIGH     VERY HIGH   0 D-90 D    19 - 174         ----         ----        ----  91 D- 9 Y     0 -  74        75 -  99     >/= 100      ----    10-19 Y     0 -  89        90 - 129     >/= 130      ----    20-24 Y     0 - 114       115 - 149     >/= 150      ----         >24 Y     0 - 149       150 - 199    200- 499    >/= 500  .   Venipuncture immediately after or during the    administration of Metamizole may lead to falsely   low results. Testing should be performed immediately   prior to Metamizole dosing.       HDL   Date Value Ref Range Status   10/26/2022 51.5 mg/dL Final     Comment:     .      AGE      VERY LOW   LOW     NORMAL    HIGH        0-19 Y       < 35   < 40     40-45     ----    20-24 Y       ----   < 40       >45     ----      >24 Y       ----   < 40     40-60      >60  .     04/04/2022 43.9 mg/dL Final     Comment:     .      AGE      VERY LOW   LOW     NORMAL    HIGH       0-19 Y       < 35   < 40     40-45     ----    20-24 Y       ----   < 40       >45     ----      >24 Y       ----   < 40     40-60      >60  .       No results found for: LDL  TSH   Date Value Ref Range Status   10/26/2022 1.44 0.44 - 3.98 mIU/L Final     Comment:      TSH testing is performed using different testing    methodology at [facility] ([facility]) than at other    [facility] hospitals. Direct result comparisons should    only be made within the same method.       No components found for: A1C  No components found for: POCA1C  No results found for: ALBUR  No components found for: POCALBUR    Seeing psychiatrist for med mgmt for anxiety and depression  No energy, in pain, feeling down  Seeing psychiatrist and psychologist    Back, knees, hands shoulder, neck pains  Seeing dr ng but dr never got back with her about sleep study  Tried seeing  new pain dr but they will not see here   No spine dr  4/2022 sed rate 24, rf/promise/ck nl  On cymbalta and gabapentin    Wants to lose wt    Lf ring finger with redness and swelling at base of nail x 1wk  Pus today  No fever or chills  Selftxt; alcohol, neosporin      Immunization History   Administered Date(s) Administered    Pfizer Purple Cap SARS-CoV-2 03/30/2021, 04/25/2021, 01/12/2022    Tdap 08/12/2018        No care team member to display     Review of Systems   Constitutional: Negative.    Respiratory:  Negative for shortness of breath and wheezing.    Cardiovascular:  Negative for chest pain.   Musculoskeletal:  Positive for arthralgias and myalgias.   Skin:         Base of lf ring finger nail with redness and swelling       Objective   Visit Vitals  /81   Pulse 90   Temp 36.8 °C (98.2 °F) (Oral)      BP  Readings from Last 3 Encounters:   05/03/23 113/81   10/26/22 128/86   07/26/22 117/75     Wt Readings from Last 3 Encounters:   05/03/23 101 kg (223 lb)   10/26/22 93.7 kg (206 lb 8 oz)   07/26/22 92.1 kg (203 lb)       The ASCVD Risk score (Claudia DK, et al., 2019) failed to calculate for the following reasons:    The 2019 ASCVD risk score is only valid for ages 40 to 79     Physical Exam  Constitutional:       General: She is not in acute distress.     Appearance: Normal appearance.   Skin:     Comments: Lf ring finger-base of nail with redness and swelling and pus and tenderness   Neurological:      Mental Status: She is alert.       Assessment/Plan   Diagnoses and all orders for this visit:  Prediabetes  -     POCT glycosylated hemoglobin (Hb A1C) manually resulted  Chronic bilateral low back pain, unspecified whether sciatica present  -     Referral to Pain Medicine; Future  -     Referral to Medical Spine; Future  -     gabapentin (Neurontin) 300 mg capsule; Take 1 capsule (300 mg) by mouth 3 times a day.  Cellulitis of left ring finger  -     fluconazole (Diflucan) 150 mg tablet; Take 1 tablet (150 mg) by mouth 1 time for 1 dose.  -     sulfamethoxazole-trimethoprim (Bactrim DS) 800-160 mg tablet; Take 1 tablet by mouth 2 times a day for 10 days.  Fibromyalgia  -     Referral to Pain Medicine; Future  Morbid obesity with body mass index (BMI) of 40.0 or higher (CMS/[facility])  -     Referral to Nutrition Services; Future  BMI 40.0-44.9, adult (CMS/[facility])  -     Referral to Nutrition Services; Future

## 2023-05-24 DIAGNOSIS — R73.03 PREDIABETES: ICD-10-CM

## 2023-05-24 DIAGNOSIS — G43.009 MIGRAINE WITHOUT AURA AND WITHOUT STATUS MIGRAINOSUS, NOT INTRACTABLE: Primary | ICD-10-CM

## 2023-05-24 RX ORDER — RIZATRIPTAN BENZOATE 10 MG/1
10 TABLET, ORALLY DISINTEGRATING ORAL ONCE AS NEEDED
Qty: 9 TABLET | Refills: 0 | Status: SHIPPED | OUTPATIENT
Start: 2023-05-24 | End: 2023-05-26 | Stop reason: SDUPTHER

## 2023-05-26 ENCOUNTER — PATIENT MESSAGE (OUTPATIENT)
Dept: PRIMARY CARE | Facility: CLINIC | Age: 38
End: 2023-05-26

## 2023-05-26 DIAGNOSIS — M54.50 CHRONIC BILATERAL LOW BACK PAIN, UNSPECIFIED WHETHER SCIATICA PRESENT: ICD-10-CM

## 2023-05-26 DIAGNOSIS — R73.03 PREDIABETES: ICD-10-CM

## 2023-05-26 DIAGNOSIS — G43.009 MIGRAINE WITHOUT AURA AND WITHOUT STATUS MIGRAINOSUS, NOT INTRACTABLE: ICD-10-CM

## 2023-05-26 DIAGNOSIS — G89.29 CHRONIC BILATERAL LOW BACK PAIN, UNSPECIFIED WHETHER SCIATICA PRESENT: ICD-10-CM

## 2023-05-26 RX ORDER — RIZATRIPTAN BENZOATE 10 MG/1
10 TABLET, ORALLY DISINTEGRATING ORAL ONCE AS NEEDED
Qty: 9 TABLET | Refills: 0 | Status: SHIPPED | OUTPATIENT
Start: 2023-05-26 | End: 2023-06-25

## 2023-07-07 RX ORDER — GABAPENTIN 300 MG/1
300 CAPSULE ORAL 3 TIMES DAILY
Qty: 90 CAPSULE | Refills: 5 | Status: SHIPPED | OUTPATIENT
Start: 2023-07-07 | End: 2023-11-20 | Stop reason: ALTCHOICE

## 2023-09-02 ENCOUNTER — PATIENT MESSAGE (OUTPATIENT)
Dept: PRIMARY CARE | Facility: CLINIC | Age: 38
End: 2023-09-02
Payer: COMMERCIAL

## 2023-09-04 PROBLEM — K21.9 HIATAL HERNIA WITH GERD: Status: ACTIVE | Noted: 2023-09-04

## 2023-09-04 PROBLEM — T45.2X1A VITAMIN D OVERDOSE: Status: ACTIVE | Noted: 2023-09-04

## 2023-09-04 PROBLEM — G43.109 MIGRAINE WITH AURA: Status: ACTIVE | Noted: 2023-09-04

## 2023-09-04 PROBLEM — G56.01 RIGHT CARPAL TUNNEL SYNDROME: Status: ACTIVE | Noted: 2018-11-01

## 2023-09-04 PROBLEM — R00.0 REFLEX TACHYCARDIA: Status: ACTIVE | Noted: 2023-09-04

## 2023-09-04 PROBLEM — E66.813 CLASS 3 SEVERE OBESITY IN ADULT: Status: ACTIVE | Noted: 2020-02-04

## 2023-09-04 PROBLEM — M79.7 FIBROMYALGIA SYNDROME: Status: ACTIVE | Noted: 2023-09-04

## 2023-09-04 PROBLEM — E66.01 CLASS 3 SEVERE OBESITY IN ADULT (MULTI): Status: ACTIVE | Noted: 2020-02-04

## 2023-09-04 PROBLEM — M19.90 DJD (DEGENERATIVE JOINT DISEASE): Status: ACTIVE | Noted: 2023-09-04

## 2023-09-04 PROBLEM — Z97.5 NEXPLANON IN PLACE: Status: ACTIVE | Noted: 2022-12-19

## 2023-09-04 PROBLEM — K44.9 HIATAL HERNIA WITH GERD: Status: ACTIVE | Noted: 2023-09-04

## 2023-09-04 PROBLEM — R87.810 CERVICAL HIGH RISK HUMAN PAPILLOMAVIRUS (HPV) DNA TEST POSITIVE: Status: ACTIVE | Noted: 2022-12-19

## 2023-09-04 PROBLEM — G72.3 ADYNAMIA: Status: ACTIVE | Noted: 2023-09-04

## 2023-09-04 PROBLEM — M51.369 DDD (DEGENERATIVE DISC DISEASE), LUMBAR: Status: ACTIVE | Noted: 2023-09-04

## 2023-09-04 PROBLEM — M51.36 DDD (DEGENERATIVE DISC DISEASE), LUMBAR: Status: ACTIVE | Noted: 2023-09-04

## 2023-09-04 RX ORDER — DULOXETIN HYDROCHLORIDE 30 MG/1
3 CAPSULE, DELAYED RELEASE ORAL DAILY
COMMUNITY
Start: 2023-03-24

## 2023-09-04 RX ORDER — FLUCONAZOLE 150 MG/1
TABLET ORAL
COMMUNITY
Start: 2023-05-03 | End: 2023-10-29 | Stop reason: ALTCHOICE

## 2023-09-04 RX ORDER — GABAPENTIN 100 MG/1
1 CAPSULE ORAL 2 TIMES DAILY
COMMUNITY
Start: 2022-04-13 | End: 2023-10-29 | Stop reason: ALTCHOICE

## 2023-09-04 RX ORDER — SERTRALINE HYDROCHLORIDE 50 MG/1
50 TABLET, FILM COATED ORAL DAILY
COMMUNITY
End: 2023-11-19 | Stop reason: ALTCHOICE

## 2023-09-04 RX ORDER — SERTRALINE HYDROCHLORIDE 100 MG/1
100 TABLET, FILM COATED ORAL DAILY
COMMUNITY

## 2023-09-04 RX ORDER — OXYCODONE AND ACETAMINOPHEN 5; 325 MG/1; MG/1
1 TABLET ORAL EVERY 6 HOURS PRN
COMMUNITY
Start: 2023-06-16 | End: 2023-10-29 | Stop reason: ALTCHOICE

## 2023-09-07 RX ORDER — NALTREXONE HYDROCHLORIDE AND BUPROPION HYDROCHLORIDE 8; 90 MG/1; MG/1
TABLET, EXTENDED RELEASE ORAL
Qty: 120 TABLET | Refills: 12 | Status: SHIPPED | OUTPATIENT
Start: 2023-09-07

## 2023-10-10 ENCOUNTER — APPOINTMENT (OUTPATIENT)
Dept: NUTRITION | Facility: HOSPITAL | Age: 38
End: 2023-10-10
Payer: COMMERCIAL

## 2023-10-12 ENCOUNTER — ALLIED HEALTH (OUTPATIENT)
Dept: INTEGRATIVE MEDICINE | Facility: CLINIC | Age: 38
End: 2023-10-12
Payer: COMMERCIAL

## 2023-10-12 DIAGNOSIS — G89.29 CHRONIC LOW BACK PAIN WITHOUT SCIATICA, UNSPECIFIED BACK PAIN LATERALITY: ICD-10-CM

## 2023-10-12 DIAGNOSIS — G89.29 NECK PAIN, CHRONIC: ICD-10-CM

## 2023-10-12 DIAGNOSIS — M54.2 NECK PAIN, CHRONIC: ICD-10-CM

## 2023-10-12 DIAGNOSIS — M54.50 CHRONIC LOW BACK PAIN WITHOUT SCIATICA, UNSPECIFIED BACK PAIN LATERALITY: ICD-10-CM

## 2023-10-12 DIAGNOSIS — M99.02 SOMATIC DYSFUNCTION OF THORACIC REGION: ICD-10-CM

## 2023-10-12 DIAGNOSIS — M99.01 SOMATIC DYSFUNCTION OF CERVICAL REGION: ICD-10-CM

## 2023-10-12 DIAGNOSIS — M99.04 SACROILIAC JOINT SOMATIC DYSFUNCTION: ICD-10-CM

## 2023-10-12 DIAGNOSIS — M79.18 MYALGIA, OTHER SITE: ICD-10-CM

## 2023-10-12 DIAGNOSIS — M99.03 SOMATIC DYSFUNCTION OF LUMBAR REGION: Primary | ICD-10-CM

## 2023-10-12 DIAGNOSIS — M54.9 DORSALGIA: ICD-10-CM

## 2023-10-12 PROCEDURE — 99203 OFFICE O/P NEW LOW 30 MIN: CPT | Performed by: CHIROPRACTOR

## 2023-10-12 PROCEDURE — 98941 CHIROPRACT MANJ 3-4 REGIONS: CPT | Performed by: CHIROPRACTOR

## 2023-10-12 ASSESSMENT — ENCOUNTER SYMPTOMS
TROUBLE SWALLOWING: 0
FATIGUE: 0
FEVER: 0
CONSTIPATION: 0
DIARRHEA: 0
JOINT SWELLING: 0
CHEST TIGHTNESS: 0
FREQUENCY: 0
ABDOMINAL PAIN: 0
CONFUSION: 0

## 2023-10-12 NOTE — PROGRESS NOTES
Subjective   Patient ID: Jean-Claude Ronquillo is a 38 y.o. female who presents today for full spine complaints, fibromyalgia.    1/24 VPCY  Manipulation only    HPI - 10/12/2023: The patient is presenting today for initial evaluation and treatment of chronic full spine complaints.  Medical history is significant for fibromyalgia.  She reports that she has been dealing with this pain since the age of 24.  She denies any significant injuries in the past.  Her symptoms are constant, and describes her pain as achy, dull and sore.  She will experience occasional headaches with her neck pain which radiate to the frontal region.  She experiences bilateral radicular symptoms in the upper and lower extremities, and reports that her feet are constantly tingling.  She denies any hugo weakness in the extremities.  Previous treatments have included physical therapy and injections with little relief.  She reports that she did attempt acupuncture in the past, but feels like she did not do enough of it to gauge if it was helpful.  She has not received chiropractic care in the past.  She does have difficulty performing most daily task, and does not sleep well due to her pain syndrome.    Review of Systems   Constitutional:  Negative for fatigue and fever.   HENT:  Negative for trouble swallowing.    Eyes:  Negative for visual disturbance.   Respiratory:  Negative for chest tightness.    Cardiovascular:  Negative for chest pain and leg swelling.   Gastrointestinal:  Negative for abdominal pain, constipation and diarrhea.   Genitourinary:  Negative for frequency.   Musculoskeletal:  Negative for joint swelling.   Neurological:  Negative for syncope.   Psychiatric/Behavioral:  Negative for confusion.    All other systems reviewed and are negative.      Objective   MRI 09/22/2021: At the L4/L5 level,  there is a mild posterior disc bulge along with degenerative facet changes and ligamentum flavum hypertrophy contribute to mild spinal canal  narrowing. There is mild-to-moderate encroachment upon the neural foramen bilaterally.  There is evidence of degenerative changes at other levels of the lumbar spine.    The patient is alert and oriented x 3. FHC.  Cranial nerves II-XII are grossly intact. No difficulty with transitional movements is observed.  Rounded shoulders.  Elevated left shoulder.  Increased thoracic kyphosis. Elevated left iliac crest.  No deficits observed when analyzing gait.  No scarring is present.  No evidence of muscle wasting.    Moderate to severe hypertonicity and tenderness is present in the suboccipitals, cervical paraspinals, scalenes, upper trapezius, levator scapulae, rhomboids, thoracic paraspinals, lumbar paraspinals, quadratus lumborum, upper gluteals, piriformis.  Cervical spine findings are greater on the left, and lumbosacral findings are greater on the right.    Segmental joint dysfunction was assessed with motion palpation and is identified in the following areas:  Cervical: C2/3  Thoracic: T3/4, T4/5, T/6  Lumbopelvic: L4/5, L5/S1, Right SI    Cervical spine range of motion is reduced by 25% and painful in flexion and side bending bilaterally.  Thoracic extension is reduced.  Lumbar spine range of motion is reduced by 20% and painful in extension.      Upper extremity reflexes are diffusely graded 2+.  Lower extremity reflexes are diffusely graded 2+.    Foraminal compression test is palliative.  Cervical distraction test is palliative.  VBI test is negative.  Slump test is negative bilaterally.  Kemps test is provocative bilaterally.  Yeomans test is provocative on the right.  Nachlas test is negative bilaterally.    Assessment/Plan   The patient's presentation today is consistent with somatic dysfunction as well as myofascial hypertonicity contributing to her pain syndrome.  Her presentation is also consistent with fibromyalgia.  I do not identify any contraindications to a trial of care, but we will proceed with  caution as it relates to any myofascial treatments as this can be provocative.  Treatment will include joint manipulation, advisement on home exercises, and manual stretching techniques to tolerance.    Today's treatment:  Drop-table manipulation applied to the right sacroiliac joint.   Diversified manipulation to the involved cervical, thoracic and lumbar segments.      Treatment Plan:   The patient and I discussed the risks and benefits of chiropractic care. Based on the patient's subjective complaints along with the examination findings, it is advised that a course of chiropractic treatment be initiated. Treatment will include joint manipulation, advisement on home exercises, and manual stretching techniques to tolerance.  The goals of care include: Reduced pain levels, improve functional status.  The patient tolerated today's treatment with little or no additional discomfort and was instructed to contact the office for questions or concerns. They were advised on the potential for post-treatment soreness.   Will see patient at a frequency of one-two visits per week for 6 visits (Visit 1/6).  We will closely monitor their response to care to determine if any changes need to occur, if advanced imaging is needed, or if referral to other providers is appropriate.     Please note: Voice to text software was used when completing this note. While the note was proofread, portions may include grammatical errors. Please contact me with any questions/concerns as it relates to these types of errors.

## 2023-10-19 ENCOUNTER — ALLIED HEALTH (OUTPATIENT)
Dept: INTEGRATIVE MEDICINE | Facility: CLINIC | Age: 38
End: 2023-10-19
Payer: COMMERCIAL

## 2023-10-19 DIAGNOSIS — M99.02 SOMATIC DYSFUNCTION OF THORACIC REGION: ICD-10-CM

## 2023-10-19 DIAGNOSIS — M99.03 SOMATIC DYSFUNCTION OF LUMBAR REGION: Primary | ICD-10-CM

## 2023-10-19 DIAGNOSIS — M54.50 CHRONIC LOW BACK PAIN WITHOUT SCIATICA, UNSPECIFIED BACK PAIN LATERALITY: ICD-10-CM

## 2023-10-19 DIAGNOSIS — M99.04 SACROILIAC JOINT SOMATIC DYSFUNCTION: ICD-10-CM

## 2023-10-19 DIAGNOSIS — M99.01 SOMATIC DYSFUNCTION OF CERVICAL REGION: ICD-10-CM

## 2023-10-19 DIAGNOSIS — G89.29 NECK PAIN, CHRONIC: ICD-10-CM

## 2023-10-19 DIAGNOSIS — M79.18 MYALGIA, OTHER SITE: ICD-10-CM

## 2023-10-19 DIAGNOSIS — M54.2 NECK PAIN, CHRONIC: ICD-10-CM

## 2023-10-19 DIAGNOSIS — G89.29 CHRONIC LOW BACK PAIN WITHOUT SCIATICA, UNSPECIFIED BACK PAIN LATERALITY: ICD-10-CM

## 2023-10-19 DIAGNOSIS — M54.9 DORSALGIA: ICD-10-CM

## 2023-10-19 PROCEDURE — 98941 CHIROPRACT MANJ 3-4 REGIONS: CPT | Performed by: CHIROPRACTOR

## 2023-10-19 ASSESSMENT — ENCOUNTER SYMPTOMS
ABDOMINAL PAIN: 0
CONFUSION: 0
JOINT SWELLING: 0
DIARRHEA: 0
CHEST TIGHTNESS: 0
TROUBLE SWALLOWING: 0
FEVER: 0
CONSTIPATION: 0
FREQUENCY: 0
FATIGUE: 0

## 2023-10-19 NOTE — PROGRESS NOTES
Subjective   Patient ID: Jean-Claude Ronquillo is a 38 y.o. female who presents today for full spine complaints, fibromyalgia.    2/24 VPCY  Manipulation only    HPI -patient reports having a fair response to initial visit.  Unfortunately, 2 days ago she sustained what she considers a pinched nerve in the left side of her neck when she abruptly rotated the neck to the left.  The pain in the left-sided neck is constant and is causing radiation into the left upper extremity.  She is also experiencing headaches.     HPI - 10/12/2023: The patient is presenting today for initial evaluation and treatment of chronic full spine complaints.  Medical history is significant for fibromyalgia.  She reports that she has been dealing with this pain since the age of 24.  She denies any significant injuries in the past.  Her symptoms are constant, and describes her pain as achy, dull and sore.  She will experience occasional headaches with her neck pain which radiate to the frontal region.  She experiences bilateral radicular symptoms in the upper and lower extremities, and reports that her feet are constantly tingling.  She denies any hugo weakness in the extremities.  Previous treatments have included physical therapy and injections with little relief.  She reports that she did attempt acupuncture in the past, but feels like she did not do enough of it to gauge if it was helpful.  She has not received chiropractic care in the past.  She does have difficulty performing most daily task, and does not sleep well due to her pain syndrome.    Review of Systems   Constitutional:  Negative for fatigue and fever.   HENT:  Negative for trouble swallowing.    Eyes:  Negative for visual disturbance.   Respiratory:  Negative for chest tightness.    Cardiovascular:  Negative for chest pain and leg swelling.   Gastrointestinal:  Negative for abdominal pain, constipation and diarrhea.   Genitourinary:  Negative for frequency.   Musculoskeletal:   Negative for joint swelling.   Neurological:  Negative for syncope.   Psychiatric/Behavioral:  Negative for confusion.    All other systems reviewed and are negative.      Relevant Imaging:  MRI 09/22/2021: At the L4/L5 level,  there is a mild posterior disc bulge along with degenerative facet changes and ligamentum flavum hypertrophy contribute to mild spinal canal narrowing. There is mild-to-moderate encroachment upon the neural foramen bilaterally.  There is evidence of degenerative changes at other levels of the lumbar spine.       Objective     The patient is alert and oriented x 3. FHC.  Cranial nerves II-XII are grossly intact. No difficulty with transitional movements is observed.  Rounded shoulders.  Elevated left shoulder.  Increased thoracic kyphosis. Elevated left iliac crest.  No deficits observed when analyzing gait.  No scarring is present.  No evidence of muscle wasting.     Moderate to severe hypertonicity and tenderness is present in the suboccipitals, cervical paraspinals, scalenes, upper trapezius, levator scapulae, rhomboids, thoracic paraspinals, lumbar paraspinals, quadratus lumborum, upper gluteals, piriformis.  Cervical spine findings are greater on the left, and lumbosacral findings are greater on the right.  Trigger point activity noted in the left upper trapezius.     Segmental joint dysfunction was assessed with motion palpation and is identified in the following areas:  Cervical: C2/3  Thoracic: T3/4, T4/5, T/6  Lumbopelvic: L4/5, L5/S1, Right SI     Cervical spine left rotation and left sidebending are reduced and painful today.     (Upper extremity reflexes are diffusely graded 2+.  Lower extremity reflexes are diffusely graded 2+.)     (Foraminal compression test is palliative.  Cervical distraction test is palliative.  VBI test is negative.  Slump test is negative bilaterally.  Kemps test is provocative bilaterally.  Yeomans test is provocative on the right.  Nachlas test is  negative bilaterally.)    Assessment/Plan     (The patient's presentation today is consistent with somatic dysfunction as well as myofascial hypertonicity contributing to her pain syndrome.  Her presentation is also consistent with fibromyalgia.  I do not identify any contraindications to a trial of care, but we will proceed with caution as it relates to any myofascial treatments as this can be provocative.  Treatment will include joint manipulation, advisement on home exercises, and manual stretching techniques to tolerance. )      Today's treatment:  Drop-table manipulation applied to the right sacroiliac joint.   Diversified manipulation to the involved cervical, thoracic and lumbar segments.    Neuromuscular re-education: Manual stretching applied to the upper trapezii.  ART in a seated position applied to the left upper trapezius and left distal levator scapula.  5 minutes.      Treatment Plan:   The patient tolerated today's treatment with little or no additional discomfort and was instructed to contact the office for questions or concerns. Will see patient at a frequency of one visit per week for 6 visits (Visit 2/6).  We will closely monitor their response to care to determine if any changes need to occur, if advanced imaging is needed, or if referral to other providers is appropriate.     Please note: Voice to text software was used when completing this note. While the note was proofread, portions may include grammatical errors. Please contact me with any questions/concerns as it relates to these types of errors.

## 2023-10-29 PROBLEM — T45.2X1A VITAMIN D OVERDOSE: Status: RESOLVED | Noted: 2023-09-04 | Resolved: 2023-10-29

## 2023-11-07 DIAGNOSIS — E03.9 ACQUIRED HYPOTHYROIDISM: Primary | ICD-10-CM

## 2023-11-08 ENCOUNTER — TELEPHONE (OUTPATIENT)
Dept: PRIMARY CARE | Facility: CLINIC | Age: 38
End: 2023-11-08
Payer: COMMERCIAL

## 2023-11-08 RX ORDER — LEVOTHYROXINE SODIUM 75 UG/1
75 TABLET ORAL DAILY
Qty: 30 TABLET | Refills: 0 | Status: SHIPPED | OUTPATIENT
Start: 2023-11-08 | End: 2023-12-06

## 2023-11-16 ENCOUNTER — APPOINTMENT (OUTPATIENT)
Dept: INTEGRATIVE MEDICINE | Facility: CLINIC | Age: 38
End: 2023-11-16
Payer: COMMERCIAL

## 2023-11-19 ASSESSMENT — ENCOUNTER SYMPTOMS
EYE REDNESS: 0
POLYPHAGIA: 0
NECK PAIN: 0
PALPITATIONS: 0
FREQUENCY: 0
COUGH: 0
BACK PAIN: 0
SORE THROAT: 0
FATIGUE: 0
SHORTNESS OF BREATH: 0
HEADACHES: 0
CHILLS: 0
DYSURIA: 0
POLYDIPSIA: 0
DIZZINESS: 0
APPETITE CHANGE: 0
WOUND: 0
EYE PAIN: 0
BRUISES/BLEEDS EASILY: 0
HEMATURIA: 0
VOMITING: 0
TROUBLE SWALLOWING: 0
FEVER: 0
ABDOMINAL PAIN: 0
CONFUSION: 0
EYE DISCHARGE: 0
UNEXPECTED WEIGHT CHANGE: 0
ADENOPATHY: 0
BLOOD IN STOOL: 0
HALLUCINATIONS: 0

## 2023-11-19 NOTE — PROGRESS NOTES
Subjective   Patient ID: Jean-Claude Ronquillo is a 38 y.o. female who presents for No chief complaint on file..  Is pt fasting? yes  Does pt see any providers other than care team below:   vikas Dejesus, berenice, skyler, nikki, hui  Saw dietician  *Using cpap? No has not gotten one. Order was from berenice  *Is pt taking vitamin d rx or otc? Over the counter  Last use of maxalt for migraines? Does not remember  Does pt want flu shot? yes  *Has pt had a mammogram? no  If yes when  Did pt do a colposcopy with dr bean? Yes , 12/19/22; record scanned  What concerns does she have about body aches? She has body pain from head to toe, more so when its cold.  Did pt see spine dr? She believes she did  Did pt see [facility] pain dr? no  What concerns about wt? Cannot seem to lose weight.   Is pt still taking contrave? yes  Did pt check insurance coverage for wegovy and saxenda? Wegovy and ozempic was not approved.    Poc A1c=5.7    Phq9=19; pt sees psychiatry and therapist    Do not take vit d out of med list    No care team member to display    HPI  Last labs-5/2023 a1c 5.1  10/2022 tsh nl, cmp nl, lipid nl; 4/2022 cbc nl   Due for labs- all plus vit d    Cholesterol   Date Value Ref Range Status   10/26/2022 133 0 - 199 mg/dL Final     Comment:     .      AGE      DESIRABLE   BORDERLINE HIGH   HIGH     0-19 Y     0 - 169       170 - 199     >/= 200    20-24 Y     0 - 189       190 - 224     >/= 225         >24 Y     0 - 199       200 - 239     >/= 240   **All ranges are based on fasting samples. Specific   therapeutic targets will vary based on patient-specific   cardiac risk.  .   Pediatric guidelines reference:Pediatrics 2011, 128(S5).   Adult guidelines reference: NCEP ATPIII Guidelines,     ADA 2001, 258:2486-97  .   Venipuncture immediately after or during the    administration of Metamizole may lead to falsely   low results. Testing should be performed immediately   prior to Metamizole dosing.     04/04/2022 116 0 - 199  mg/dL Final     Comment:     .      AGE      DESIRABLE   BORDERLINE HIGH   HIGH     0-19 Y     0 - 169       170 - 199     >/= 200    20-24 Y     0 - 189       190 - 224     >/= 225         >24 Y     0 - 199       200 - 239     >/= 240   **All ranges are based on fasting samples. Specific   therapeutic targets will vary based on patient-specific   cardiac risk.  .   Pediatric guidelines reference:Pediatrics 2011, 128(S5).   Adult guidelines reference: NCEP ATPIII Guidelines,     ADA 2001, 258:2486-97  .   Venipuncture immediately after or during the    administration of Metamizole may lead to falsely   low results. Testing should be performed immediately   prior to Metamizole dosing.       Triglycerides   Date Value Ref Range Status   10/26/2022 83 0 - 149 mg/dL Final     Comment:     .      AGE      DESIRABLE   BORDERLINE HIGH   HIGH     VERY HIGH   0 D-90 D    19 - 174         ----         ----        ----  91 D- 9 Y     0 -  74        75 -  99     >/= 100      ----    10-19 Y     0 -  89        90 - 129     >/= 130      ----    20-24 Y     0 - 114       115 - 149     >/= 150      ----         >24 Y     0 - 149       150 - 199    200- 499    >/= 500  .   Venipuncture immediately after or during the    administration of Metamizole may lead to falsely   low results. Testing should be performed immediately   prior to Metamizole dosing.     04/04/2022 126 0 - 149 mg/dL Final     Comment:     .      AGE      DESIRABLE   BORDERLINE HIGH   HIGH     VERY HIGH   0 D-90 D    19 - 174         ----         ----        ----  91 D- 9 Y     0 -  74        75 -  99     >/= 100      ----    10-19 Y     0 -  89        90 - 129     >/= 130      ----    20-24 Y     0 - 114       115 - 149     >/= 150      ----         >24 Y     0 - 149       150 - 199    200- 499    >/= 500  .   Venipuncture immediately after or during the    administration of Metamizole may lead to falsely   low results. Testing should be performed immediately    "prior to Metamizole dosing.       HDL   Date Value Ref Range Status   10/26/2022 51.5 mg/dL Final     Comment:     .      AGE      VERY LOW   LOW     NORMAL    HIGH       0-19 Y       < 35   < 40     40-45     ----    20-24 Y       ----   < 40       >45     ----      >24 Y       ----   < 40     40-60      >60  .     04/04/2022 43.9 mg/dL Final     Comment:     .      AGE      VERY LOW   LOW     NORMAL    HIGH       0-19 Y       < 35   < 40     40-45     ----    20-24 Y       ----   < 40       >45     ----      >24 Y       ----   < 40     40-60      >60  .       No results found for: \"LDL\"  TSH   Date Value Ref Range Status   10/26/2022 1.44 0.44 - 3.98 mIU/L Final     Comment:      TSH testing is performed using different testing    methodology at [facility] than at other    [facility] hospitals. Direct result comparisons should    only be made within the same method.       No results found for: \"A1C\"  No components found for: \"POCA1C\"  No results found for: \"ALBUR\"  No components found for: \"POCALBUR\"      Other concerns: She has body pain from head to toe, more so when its cold.    Saw dietician in aug  Started contrave  Can't take qsymia due to topamax allergy.       bps at home- none    ER/urgicare visits in the last year- 5/2023  Hospitalizations in the last year- none      last Pap-10/26/22  ascus pos 18; colp?yes  H/o abn pap-10/2022    FH ovarian, endometrial, cervical, uterine ca-none  Current birth control method-nexplanon  No change in contraception desired      last mammo- (40-75/90) due    FH br ca-none      FH colon ca-none        Exercise- stairs, treadmill, yoga  Diet-2 meals-lunch, dinner   Cut out redbull; occas coffee; flavor in water  There is no height or weight on file to calculate BMI.    last eye dr appt- glasses <1yr  No vision issues    last dental appt- 6-7 mon ago    BMs-regular  Sleep-able to fall asleep but hard  to stay asleep; has mod to severe sleep apnea with no cpap  no " cp, swelling, sob, abd pain, n/v/d/c, blood in stool or black stools  STI testing including hiv (age 15-65) and hep c screening (18-79)-declines        Immunization History   Administered Date(s) Administered    Flu vaccine (IIV4), preservative free *Check age/dose* 12/29/2017, 10/20/2021, 10/26/2022    Influenza Whole 10/19/2010    Pfizer Purple Cap SARS-CoV-2 03/30/2021, 04/25/2021, 01/12/2022    Pneumococcal polysaccharide vaccine, 23-valent, age 2 years and older (PNEUMOVAX 23) 11/01/2018    Td vaccine, age 7 years and older (TDVAX) 01/18/2010    Tdap vaccine, age 7 year and older (BOOSTRIX) 06/19/2009, 04/04/2018, 08/12/2018         fractures in lifetime-rt foot, lf ankle      FH heart attack, heart surgery-pat gr uncle  FH stroke-brother, dad    The 10-year ASCVD risk score (Claudia RON, et al., 2019) is: 0.2%    Values used to calculate the score:      Age: 38 years      Sex: Female      Is Non- : No      Diabetic: No      Tobacco smoker: No      Systolic Blood Pressure: 113 mmHg      Is BP treated: No      HDL Cholesterol: 51.5 mg/dL      Total Cholesterol: 133 mg/dL  Coronary Artery Calcium score:  This test is recommended for men 45 or older and women 55 or older without a history of heart disease and have 1 risk factor (high LDL cholesterol, low HDL cholesterol, high blood pressure, smoker (current or past), type 2 diabetes, IBD, lupus, RA, ankylosing spondylitis, psoriasis or family history of  heart disease <55yrs in dad, brother or child or <65yrs in mom, sister, or child.)       Review of Systems   Constitutional:  Negative for appetite change, chills, fatigue, fever and unexpected weight change.   HENT:  Negative for congestion, ear pain, sore throat and trouble swallowing.    Eyes:  Negative for pain, discharge and redness.   Respiratory:  Negative for cough and shortness of breath.    Cardiovascular:  Negative for chest pain and palpitations.   Gastrointestinal:  Negative for  abdominal pain, blood in stool and vomiting.   Endocrine: Negative for polydipsia, polyphagia and polyuria.   Genitourinary:  Negative for dysuria, frequency, hematuria and urgency.   Musculoskeletal:  Negative for back pain and neck pain.   Skin:  Negative for rash and wound.   Allergic/Immunologic: Negative for immunocompromised state.   Neurological:  Negative for dizziness, syncope and headaches.   Hematological:  Negative for adenopathy. Does not bruise/bleed easily.   Psychiatric/Behavioral:  Negative for confusion and hallucinations.        Objective   There were no vitals taken for this visit.   BP Readings from Last 3 Encounters:   05/03/23 113/81   10/26/22 128/86   07/26/22 117/75     Wt Readings from Last 3 Encounters:   05/03/23 101 kg (223 lb)   10/26/22 93.7 kg (206 lb 8 oz)   07/26/22 92.1 kg (203 lb)           Physical Exam  Constitutional:       General: She is not in acute distress.     Appearance: Normal appearance. She is not ill-appearing.   HENT:      Head: Normocephalic.      Right Ear: Tympanic membrane, ear canal and external ear normal.      Left Ear: Tympanic membrane, ear canal and external ear normal.      Nose: Nose normal.      Mouth/Throat:      Mouth: Mucous membranes are moist.      Pharynx: Oropharynx is clear.   Eyes:      Extraocular Movements: Extraocular movements intact.      Conjunctiva/sclera: Conjunctivae normal.      Pupils: Pupils are equal, round, and reactive to light.   Cardiovascular:      Rate and Rhythm: Normal rate and regular rhythm.      Heart sounds: Normal heart sounds. No murmur heard.  Pulmonary:      Effort: Pulmonary effort is normal. No respiratory distress.      Breath sounds: Normal breath sounds. No wheezing, rhonchi or rales.   Abdominal:      General: Bowel sounds are normal.      Palpations: Abdomen is soft. There is no mass.      Tenderness: There is no abdominal tenderness.   Musculoskeletal:         General: No swelling or tenderness. Normal  range of motion.      Cervical back: Normal range of motion and neck supple.      Right lower leg: No edema.      Left lower leg: No edema.   Skin:     General: Skin is warm.      Findings: No rash.   Neurological:      General: No focal deficit present.      Mental Status: She is alert and oriented to person, place, and time.      Cranial Nerves: No cranial nerve deficit.      Motor: No weakness.   Psychiatric:         Mood and Affect: Mood normal.         Behavior: Behavior normal.       Assessment/Plan   Diagnoses and all orders for this visit:  Routine general medical examination at a health care facility  -     Comprehensive Metabolic Panel; Future  -     CBC and Auto Differential; Future  -     Lipid Panel; Future  -     TSH with reflex to Free T4 if abnormal; Future  Prediabetes  -     POCT glycosylated hemoglobin (Hb A1C) manually resulted  Vitamin D deficiency  -     Vitamin D 25-Hydroxy,Total (for eval of Vitamin D levels); Future  Encounter for screening mammogram for malignant neoplasm of breast  -     BI mammo bilateral screening tomosynthesis; Future  Fibromyalgia  -     Referral to Neurology; Future  -     gabapentin (Neurontin) 600 mg tablet; Take 1 tablet (600 mg) by mouth 3 times a day.  BMI 40.0-44.9, adult (CMS/HCC)  Morbid obesity with body mass index (BMI) of 40.0 or higher (CMS/HCC)  Other orders  -     Flu vaccine (IIV4) age 6 months and greater, preservative free  -     Follow Up In Primary Care - Health Maintenance; Future

## 2023-11-20 ENCOUNTER — LAB (OUTPATIENT)
Dept: LAB | Facility: LAB | Age: 38
End: 2023-11-20
Payer: COMMERCIAL

## 2023-11-20 ENCOUNTER — OFFICE VISIT (OUTPATIENT)
Dept: PRIMARY CARE | Facility: CLINIC | Age: 38
End: 2023-11-20
Payer: COMMERCIAL

## 2023-11-20 ENCOUNTER — HOSPITAL ENCOUNTER (OUTPATIENT)
Dept: RADIOLOGY | Facility: EXTERNAL LOCATION | Age: 38
Discharge: HOME | End: 2023-11-20

## 2023-11-20 VITALS
TEMPERATURE: 97.5 F | OXYGEN SATURATION: 95 % | HEIGHT: 60 IN | SYSTOLIC BLOOD PRESSURE: 138 MMHG | BODY MASS INDEX: 45.82 KG/M2 | WEIGHT: 233.4 LBS | DIASTOLIC BLOOD PRESSURE: 88 MMHG | HEART RATE: 93 BPM

## 2023-11-20 DIAGNOSIS — Z00.00 ROUTINE GENERAL MEDICAL EXAMINATION AT A HEALTH CARE FACILITY: Primary | ICD-10-CM

## 2023-11-20 DIAGNOSIS — E66.01 MORBID OBESITY WITH BODY MASS INDEX (BMI) OF 40.0 OR HIGHER (MULTI): ICD-10-CM

## 2023-11-20 DIAGNOSIS — E55.9 VITAMIN D DEFICIENCY: ICD-10-CM

## 2023-11-20 DIAGNOSIS — R73.03 PREDIABETES: ICD-10-CM

## 2023-11-20 DIAGNOSIS — Z12.31 ENCOUNTER FOR SCREENING MAMMOGRAM FOR MALIGNANT NEOPLASM OF BREAST: ICD-10-CM

## 2023-11-20 DIAGNOSIS — Z00.00 ROUTINE GENERAL MEDICAL EXAMINATION AT A HEALTH CARE FACILITY: ICD-10-CM

## 2023-11-20 DIAGNOSIS — M79.7 FIBROMYALGIA: ICD-10-CM

## 2023-11-20 DIAGNOSIS — E55.9 VITAMIN D DEFICIENCY: Primary | ICD-10-CM

## 2023-11-20 LAB
25(OH)D3 SERPL-MCNC: 12 NG/ML (ref 30–100)
ALBUMIN SERPL BCP-MCNC: 4.2 G/DL (ref 3.4–5)
ALP SERPL-CCNC: 99 U/L (ref 33–110)
ALT SERPL W P-5'-P-CCNC: 14 U/L (ref 7–45)
ANION GAP SERPL CALC-SCNC: 14 MMOL/L (ref 10–20)
AST SERPL W P-5'-P-CCNC: 14 U/L (ref 9–39)
BASOPHILS # BLD AUTO: 0.06 X10*3/UL (ref 0–0.1)
BASOPHILS NFR BLD AUTO: 0.6 %
BILIRUB SERPL-MCNC: 0.3 MG/DL (ref 0–1.2)
BUN SERPL-MCNC: 13 MG/DL (ref 6–23)
CALCIUM SERPL-MCNC: 9.1 MG/DL (ref 8.6–10.6)
CHLORIDE SERPL-SCNC: 105 MMOL/L (ref 98–107)
CHOLEST SERPL-MCNC: 137 MG/DL (ref 0–199)
CHOLESTEROL/HDL RATIO: 2.8
CO2 SERPL-SCNC: 25 MMOL/L (ref 21–32)
CREAT SERPL-MCNC: 0.65 MG/DL (ref 0.5–1.05)
EOSINOPHIL # BLD AUTO: 0.18 X10*3/UL (ref 0–0.7)
EOSINOPHIL NFR BLD AUTO: 1.8 %
ERYTHROCYTE [DISTWIDTH] IN BLOOD BY AUTOMATED COUNT: 14.3 % (ref 11.5–14.5)
GFR SERPL CREATININE-BSD FRML MDRD: >90 ML/MIN/1.73M*2
GLUCOSE SERPL-MCNC: 87 MG/DL (ref 74–99)
HCT VFR BLD AUTO: 42.8 % (ref 36–46)
HDLC SERPL-MCNC: 48.5 MG/DL
HGB BLD-MCNC: 14.3 G/DL (ref 12–16)
IMM GRANULOCYTES # BLD AUTO: 0.04 X10*3/UL (ref 0–0.7)
IMM GRANULOCYTES NFR BLD AUTO: 0.4 % (ref 0–0.9)
LDLC SERPL CALC-MCNC: 71 MG/DL
LYMPHOCYTES # BLD AUTO: 1.92 X10*3/UL (ref 1.2–4.8)
LYMPHOCYTES NFR BLD AUTO: 19.1 %
MCH RBC QN AUTO: 29.1 PG (ref 26–34)
MCHC RBC AUTO-ENTMCNC: 33.4 G/DL (ref 32–36)
MCV RBC AUTO: 87 FL (ref 80–100)
MONOCYTES # BLD AUTO: 0.54 X10*3/UL (ref 0.1–1)
MONOCYTES NFR BLD AUTO: 5.4 %
NEUTROPHILS # BLD AUTO: 7.3 X10*3/UL (ref 1.2–7.7)
NEUTROPHILS NFR BLD AUTO: 72.7 %
NON HDL CHOLESTEROL: 89 MG/DL (ref 0–149)
NRBC BLD-RTO: 0 /100 WBCS (ref 0–0)
PLATELET # BLD AUTO: 425 X10*3/UL (ref 150–450)
POC HEMOGLOBIN A1C: 5.7 % (ref 4.2–6.5)
POTASSIUM SERPL-SCNC: 4.5 MMOL/L (ref 3.5–5.3)
PROT SERPL-MCNC: 6.9 G/DL (ref 6.4–8.2)
RBC # BLD AUTO: 4.92 X10*6/UL (ref 4–5.2)
SODIUM SERPL-SCNC: 139 MMOL/L (ref 136–145)
TRIGL SERPL-MCNC: 86 MG/DL (ref 0–149)
TSH SERPL-ACNC: 1.97 MIU/L (ref 0.44–3.98)
VLDL: 17 MG/DL (ref 0–40)
WBC # BLD AUTO: 10 X10*3/UL (ref 4.4–11.3)

## 2023-11-20 PROCEDURE — 80053 COMPREHEN METABOLIC PANEL: CPT

## 2023-11-20 PROCEDURE — 85025 COMPLETE CBC W/AUTO DIFF WBC: CPT

## 2023-11-20 PROCEDURE — 84443 ASSAY THYROID STIM HORMONE: CPT

## 2023-11-20 PROCEDURE — 99396 PREV VISIT EST AGE 40-64: CPT | Performed by: NURSE PRACTITIONER

## 2023-11-20 PROCEDURE — 1036F TOBACCO NON-USER: CPT | Performed by: NURSE PRACTITIONER

## 2023-11-20 PROCEDURE — 3008F BODY MASS INDEX DOCD: CPT | Performed by: NURSE PRACTITIONER

## 2023-11-20 PROCEDURE — 80061 LIPID PANEL: CPT

## 2023-11-20 PROCEDURE — 83036 HEMOGLOBIN GLYCOSYLATED A1C: CPT | Performed by: NURSE PRACTITIONER

## 2023-11-20 PROCEDURE — 82306 VITAMIN D 25 HYDROXY: CPT

## 2023-11-20 PROCEDURE — 90471 IMMUNIZATION ADMIN: CPT | Performed by: NURSE PRACTITIONER

## 2023-11-20 PROCEDURE — 36415 COLL VENOUS BLD VENIPUNCTURE: CPT

## 2023-11-20 PROCEDURE — 90686 IIV4 VACC NO PRSV 0.5 ML IM: CPT | Performed by: NURSE PRACTITIONER

## 2023-11-20 RX ORDER — ERGOCALCIFEROL 1.25 MG/1
50000 CAPSULE ORAL
Qty: 15 CAPSULE | Refills: 4 | Status: SHIPPED | OUTPATIENT
Start: 2023-11-20

## 2023-11-20 RX ORDER — GABAPENTIN 600 MG/1
600 TABLET ORAL 3 TIMES DAILY
Qty: 90 TABLET | Refills: 5 | Status: SHIPPED | OUTPATIENT
Start: 2023-11-20 | End: 2024-06-03

## 2023-11-20 ASSESSMENT — PATIENT HEALTH QUESTIONNAIRE - PHQ9
6. FEELING BAD ABOUT YOURSELF - OR THAT YOU ARE A FAILURE OR HAVE LET YOURSELF OR YOUR FAMILY DOWN: NEARLY EVERY DAY
7. TROUBLE CONCENTRATING ON THINGS, SUCH AS READING THE NEWSPAPER OR WATCHING TELEVISION: NEARLY EVERY DAY
2. FEELING DOWN, DEPRESSED OR HOPELESS: MORE THAN HALF THE DAYS
1. LITTLE INTEREST OR PLEASURE IN DOING THINGS: NEARLY EVERY DAY
2. FEELING DOWN, DEPRESSED OR HOPELESS: SEVERAL DAYS
SUM OF ALL RESPONSES TO PHQ QUESTIONS 1-9: 19
3. TROUBLE FALLING OR STAYING ASLEEP OR SLEEPING TOO MUCH: NEARLY EVERY DAY
SUM OF ALL RESPONSES TO PHQ9 QUESTIONS 1 AND 2: 5
5. POOR APPETITE OR OVEREATING: MORE THAN HALF THE DAYS
10. IF YOU CHECKED OFF ANY PROBLEMS, HOW DIFFICULT HAVE THESE PROBLEMS MADE IT FOR YOU TO DO YOUR WORK, TAKE CARE OF THINGS AT HOME, OR GET ALONG WITH OTHER PEOPLE: VERY DIFFICULT
8. MOVING OR SPEAKING SO SLOWLY THAT OTHER PEOPLE COULD HAVE NOTICED. OR THE OPPOSITE, BEING SO FIGETY OR RESTLESS THAT YOU HAVE BEEN MOVING AROUND A LOT MORE THAN USUAL: NOT AT ALL
SUM OF ALL RESPONSES TO PHQ9 QUESTIONS 1 AND 2: 4
1. LITTLE INTEREST OR PLEASURE IN DOING THINGS: NEARLY EVERY DAY
9. THOUGHTS THAT YOU WOULD BE BETTER OFF DEAD, OR OF HURTING YOURSELF: NOT AT ALL
4. FEELING TIRED OR HAVING LITTLE ENERGY: NEARLY EVERY DAY

## 2023-11-20 NOTE — PATIENT INSTRUCTIONS
Call dr ng to see if she is going to write for a cpap or send you for a in sleep center sleep study    Check [facility] for pharmacy coverage  Incr contrave 2 in am and 1 pm x 7d then 2 twice a day    Incr cardio 5d a wk  At least 1000 yovany a day  2 veg and 1 fruit at least  Protein on salad  Keep pop to regular    [facility] pain   Next: neuro dr Cochran gabapentin to 300mg 2/1/1 x 3d then 2/2/1 x 3d then new rx 600mg 1 three times a day    A1C today=5.7  This is the 3 month average of your sugars.  You are in the prediabetes range 5.7-6.4    Handouts given to pt:  physical handout    Labs:    You can use the lab in our building when fasting. The hrs are: Monday-Friday, 7 a.m. - 5 p.m., Saturday 8 a.m. - 12 noon.   No appt needed, BUT YOU DO NEED THE PAPER ORDER.    Fasting is no food, drink, gum or mints other than water for 12 hrs.   Results will be back in 2-3 business days for most labs. It is always recommended for any orders (labs, xrays, ultrasounds,MRI, ct scan, procedures etc) to check with your insurance provider for expected costs or expenses to you.     Screenings:    To set up mammogram, call [phone]    You will get your results via phone from my medical assistant if you do not have MyChart.  OR  You will get your results via [employer]    If a result is urgent, I will call to speak to you.    Vaccines:  ---- flu vaccine-today    General recommendations:  Exercise-cardio 4-5d/wk 30min each day  Diet-Breakfast-toast (my favorite Micaela Carbone Delighful Multigrain or [employer] Killer Bread Good Seed thin-sliced)/bagel/English muffin-whole wheat flour as a 1st ingredient or cereal/oatmeal/granola bar-fiber 4g or more or protein like eggs or peanut butter; optional veggies  Lunch-protein, 1/2c carb or 2 slices bread, veg 1c  Dinner-protein, fist sized carb, veg 1c  Fruit 2 a day  Dairy 2 a day-milk, soy milk, almond milk, cheese, yogurt, cottage cheese  Snacks-Protein-hard boiled egg, nuts  (walnuts/almonds/pecans/pistachios 1/4c), hummus, beef/deer jerky or meat sticks; vegetable, fruit, dairy-milk(1%, skim, almond, soy)/cheese (not a lot of cheddar)/yogurt (Greek is best-my favorite Dannon Fruit on the Bottom Greek)/cottage cheese 2%; triscuits/ popcorn/wheat thins have a lot of fiber; follow serving size on bag/box/container  increase water  Limit alcohol to 1 drink per day for women and 2 drinks per day for men (1 drink=12oz beer or 5oz wine or 1 1/2oz liquor)  Calcium: 500mg 1 twice a day if age 50 and younger and 600mg 1 twice a day if over age 50 (calcium citrate can be taken without food)  Vitamin D: 800-5000 IU/day  Limit salt to <2300mg a day if age 50 and under and <1500mg a day if over age 50/have high bp or diabetes or kidney disease  Recommend folate for childbearing age women 0.4mg per day (can be found in a multivitamin)  Recommend 18mg/dL of iron a day if age 50 and under and 8mg/dL a day if over age 50; take on an empty stomach at bedtime  Use sunscreen   Wear seatbelt  Recommend safe sex practices: using condoms everytime you have sex, discuss with a new partner about their past partners/history of STDs/drug use, avoid drinking alcohol or using drugs as this increases the chance that you will participate in high-risk sex, for oral sex help protect your mouth by having your partner use a condom (male or female), women should not douche after sex, be aware of your partner's body and your body-look for signs of a sore, blister, rash, or discharge, and have regular exams and periodic tests for STDs.  No distracted driving  No driving when under influence of substances  Wear a seatbelt  Eye dr every 1-2yrs  Dentist every 6-12 mon  Tetanus shot every 10yrs  Recommend flu vaccine in the fall  Appt in 6mon for follow up on anxiety, depression, prediabetes, diet, exercise and 1 year for physical      I will communicate with you via [employer] regarding messages and results. If you need help with  this, you can call the support line at [phone].    IT WAS A PLEASURE TO SEE YOU TODAY. THANK YOU FOR CHOOSING US FOR YOUR HEALTHCARE NEEDS.

## 2023-12-06 DIAGNOSIS — E03.9 ACQUIRED HYPOTHYROIDISM: ICD-10-CM

## 2023-12-06 RX ORDER — LEVOTHYROXINE SODIUM 75 UG/1
75 TABLET ORAL DAILY
Qty: 90 TABLET | Refills: 4 | Status: SHIPPED | OUTPATIENT
Start: 2023-12-06

## 2023-12-11 DIAGNOSIS — Z12.31 ENCOUNTER FOR SCREENING MAMMOGRAM FOR MALIGNANT NEOPLASM OF BREAST: Primary | ICD-10-CM

## 2023-12-11 DIAGNOSIS — G47.33 OBSTRUCTIVE SLEEP APNEA SYNDROME: Primary | ICD-10-CM

## 2024-01-12 ENCOUNTER — PATIENT MESSAGE (OUTPATIENT)
Dept: PRIMARY CARE | Facility: CLINIC | Age: 39
End: 2024-01-12
Payer: COMMERCIAL

## 2024-01-12 DIAGNOSIS — R73.03 PREDIABETES: Primary | ICD-10-CM

## 2024-05-02 ENCOUNTER — TELEPHONE (OUTPATIENT)
Dept: PRIMARY CARE | Facility: CLINIC | Age: 39
End: 2024-05-02
Payer: COMMERCIAL

## 2024-05-02 NOTE — TELEPHONE ENCOUNTER
We received a letter than pt missed her cpap appt. Pt should call [facility] [employer] to reschedule at [phone].

## 2024-05-18 PROBLEM — M79.7 FIBROMYALGIA: Status: RESOLVED | Noted: 2023-03-07 | Resolved: 2024-05-18

## 2024-05-18 PROBLEM — G72.3 ADYNAMIA: Status: RESOLVED | Noted: 2023-09-04 | Resolved: 2024-05-18

## 2024-05-30 ASSESSMENT — ENCOUNTER SYMPTOMS
WHEEZING: 0
SHORTNESS OF BREATH: 0
CONSTITUTIONAL NEGATIVE: 1

## 2024-05-30 NOTE — PROGRESS NOTES
Subjective   Patient ID: Jean-Claude Ronquillo is a 38 y.o. female who presents for No chief complaint on file..  Last physical:11/20/23  [place] 12/11/23    Is pt fasting? ***    Did pt see neuro dr for fibromyalgia?  Did pt see [facility] pain dr?  Is gabapentin helping body pain?  Is pt taking 600mg 1 tid?  Did pt get cpap?  If yes, using nightly?  Is she taking vit d rx 1 a wk?  Is pt taking contrave or ozempic?  Any other questions or concerns that pt wants to discuss today?    Poc a1c    HPI  Last labs-11/2023 A1c 5.7,  Vitamin D is low at 12 . Goal is >30. restart Vitamin D prescription 1 a week. Prescription sent. Recheck lab in 2 months. No fasting needed.  This will be a lifetime medication.  Sugar (aka glucose), kidney function, liver function and electrolytes in the CMP (comprehensive metabolic panel) were normal.  Ldl and trigs normal  Hdl slightly low at 48. Goal >50 for women. Incr exercise.  TSH (thyroid test) was normal.  CBC (complete blood count) was normal which looks at infection and anemia markers.  Due for labs- vit d-print    Cholesterol   Date Value Ref Range Status   11/20/2023 137 0 - 199 mg/dL Final     Comment:           Age      Desirable   Borderline High   High     0-19 Y     0 - 169       170 - 199     >/= 200    20-24 Y     0 - 189       190 - 224     >/= 225         >24 Y     0 - 199       200 - 239     >/= 240   **All ranges are based on fasting samples. Specific   therapeutic targets will vary based on patient-specific   cardiac risk.    Pediatric guidelines reference:Pediatrics 2011, 128(S5).Adult guidelines reference: NCEP ATPIII Guidelines,ADA 2001, 258:2486-97    Venipuncture immediately after or during the administration of Metamizole may lead to falsely low results. Testing should be performed immediately prior to Metamizole dosing.   10/26/2022 133 0 - 199 mg/dL Final     Comment:     .      AGE      DESIRABLE   BORDERLINE HIGH   HIGH     0-19 Y     0 - 169       170 - 199     >/= 200     20-24 Y     0 - 189       190 - 224     >/= 225         >24 Y     0 - 199       200 - 239     >/= 240   **All ranges are based on fasting samples. Specific   therapeutic targets will vary based on patient-specific   cardiac risk.  .   Pediatric guidelines reference:Pediatrics 2011, 128(S5).   Adult guidelines reference: NCEP ATPIII Guidelines,     ADA 2001, 258:2486-97  .   Venipuncture immediately after or during the    administration of Metamizole may lead to falsely   low results. Testing should be performed immediately   prior to Metamizole dosing.     04/04/2022 116 0 - 199 mg/dL Final     Comment:     .      AGE      DESIRABLE   BORDERLINE HIGH   HIGH     0-19 Y     0 - 169       170 - 199     >/= 200    20-24 Y     0 - 189       190 - 224     >/= 225         >24 Y     0 - 199       200 - 239     >/= 240   **All ranges are based on fasting samples. Specific   therapeutic targets will vary based on patient-specific   cardiac risk.  .   Pediatric guidelines reference:Pediatrics 2011, 128(S5).   Adult guidelines reference: NCEP ATPIII Guidelines,     ADA 2001, 258:2486-97  .   Venipuncture immediately after or during the    administration of Metamizole may lead to falsely   low results. Testing should be performed immediately   prior to Metamizole dosing.       Triglycerides   Date Value Ref Range Status   11/20/2023 86 0 - 149 mg/dL Final     Comment:        Age         Desirable   Borderline High   High     Very High   0 D-90 D    19 - 174         ----         ----        ----  91 D- 9 Y     0 -  74        75 -  99     >/= 100      ----    10-19 Y     0 -  89        90 - 129     >/= 130      ----    20-24 Y     0 - 114       115 - 149     >/= 150      ----         >24 Y     0 - 149       150 - 199    200- 499    >/= 500    Venipuncture immediately after or during the administration of Metamizole may lead to falsely low results. Testing should be performed immediately prior to Metamizole dosing.   10/26/2022  83 0 - 149 mg/dL Final     Comment:     .      AGE      DESIRABLE   BORDERLINE HIGH   HIGH     VERY HIGH   0 D-90 D    19 - 174         ----         ----        ----  91 D- 9 Y     0 -  74        75 -  99     >/= 100      ----    10-19 Y     0 -  89        90 - 129     >/= 130      ----    20-24 Y     0 - 114       115 - 149     >/= 150      ----         >24 Y     0 - 149       150 - 199    200- 499    >/= 500  .   Venipuncture immediately after or during the    administration of Metamizole may lead to falsely   low results. Testing should be performed immediately   prior to Metamizole dosing.     04/04/2022 126 0 - 149 mg/dL Final     Comment:     .      AGE      DESIRABLE   BORDERLINE HIGH   HIGH     VERY HIGH   0 D-90 D    19 - 174         ----         ----        ----  91 D- 9 Y     0 -  74        75 -  99     >/= 100      ----    10-19 Y     0 -  89        90 - 129     >/= 130      ----    20-24 Y     0 - 114       115 - 149     >/= 150      ----         >24 Y     0 - 149       150 - 199    200- 499    >/= 500  .   Venipuncture immediately after or during the    administration of Metamizole may lead to falsely   low results. Testing should be performed immediately   prior to Metamizole dosing.       HDL-Cholesterol   Date Value Ref Range Status   11/20/2023 48.5 mg/dL Final     Comment:       Age       Very Low   Low     Normal    High    0-19 Y    < 35      < 40     40-45     ----  20-24 Y    ----     < 40      >45      ----        >24 Y      ----     < 40     40-60      >60       HDL   Date Value Ref Range Status   10/26/2022 51.5 mg/dL Final     Comment:     .      AGE      VERY LOW   LOW     NORMAL    HIGH       0-19 Y       < 35   < 40     40-45     ----    20-24 Y       ----   < 40       >45     ----      >24 Y       ----   < 40     40-60      >60  .     04/04/2022 43.9 mg/dL Final     Comment:     .      AGE      VERY LOW   LOW     NORMAL    HIGH       0-19 Y       < 35   < 40     40-45     ----    20-24  "Y       ----   < 40       >45     ----      >24 Y       ----   < 40     40-60      >60  .       No results found for: \"LDL\"  Thyroid Stimulating Hormone   Date Value Ref Range Status   11/20/2023 1.97 0.44 - 3.98 mIU/L Final     No results found for: \"A1C\"  No components found for: \"POCA1C\"  No results found for: \"ALBUR\"  No components found for: \"POCALBUR\"    Exercise  Diet-breakfast  Lunch  Dinner  Snacks  Etoh    Immunization History   Administered Date(s) Administered    Flu vaccine (IIV4), preservative free *Check age/dose* 12/29/2017, 10/20/2021, 10/26/2022, 11/20/2023    Influenza Whole 10/19/2010    Pfizer Purple Cap SARS-CoV-2 03/30/2021, 04/25/2021, 01/12/2022    Pneumococcal polysaccharide vaccine, 23-valent, age 2 years and older (PNEUMOVAX 23) 11/01/2018    Td vaccine, age 7 years and older (TDVAX) 01/18/2010    Tdap vaccine, age 7 year and older (BOOSTRIX, ADACEL) 06/19/2009, 04/04/2018, 08/12/2018        No care team member to display     Review of Systems   Constitutional: Negative.    Respiratory:  Negative for shortness of breath and wheezing.    Cardiovascular:  Negative for chest pain.       Objective   There were no vitals taken for this visit.   BP Readings from Last 3 Encounters:   11/20/23 138/88   05/03/23 113/81   10/26/22 128/86     Wt Readings from Last 3 Encounters:   11/20/23 106 kg (233 lb 6.4 oz)   05/03/23 101 kg (223 lb)   10/26/22 93.7 kg (206 lb 8 oz)       The 10-year ASCVD risk score (Claudia RON, et al., 2019) is: 0.4%    Values used to calculate the score:      Age: 38 years      Sex: Female      Is Non- : No      Diabetic: No      Tobacco smoker: No      Systolic Blood Pressure: 138 mmHg      Is BP treated: No      HDL Cholesterol: 48.5 mg/dL      Total Cholesterol: 137 mg/dL     Physical Exam  Constitutional:       General: She is not in acute distress.     Appearance: Normal appearance.   Neurological:      Mental Status: She is alert. "         Assessment/Plan   {Assess/PlanSmarPalisades Medical Centers:[id number]}

## 2024-06-01 DIAGNOSIS — M79.7 FIBROMYALGIA: ICD-10-CM

## 2024-06-03 ENCOUNTER — TELEPHONE (OUTPATIENT)
Dept: PRIMARY CARE | Facility: CLINIC | Age: 39
End: 2024-06-03

## 2024-06-03 ENCOUNTER — APPOINTMENT (OUTPATIENT)
Dept: PRIMARY CARE | Facility: CLINIC | Age: 39
End: 2024-06-03
Payer: COMMERCIAL

## 2024-06-03 RX ORDER — GABAPENTIN 600 MG/1
TABLET ORAL
Qty: 90 TABLET | Refills: 0 | Status: SHIPPED | OUTPATIENT
Start: 2024-06-03

## 2024-07-03 ENCOUNTER — APPOINTMENT (OUTPATIENT)
Dept: PRIMARY CARE | Facility: CLINIC | Age: 39
End: 2024-07-03
Payer: COMMERCIAL

## 2024-07-17 ASSESSMENT — ENCOUNTER SYMPTOMS
WHEEZING: 0
CONSTITUTIONAL NEGATIVE: 1

## 2024-07-18 ENCOUNTER — OFFICE VISIT (OUTPATIENT)
Dept: PRIMARY CARE | Facility: CLINIC | Age: 39
End: 2024-07-18
Payer: COMMERCIAL

## 2024-07-18 VITALS
HEIGHT: 60 IN | TEMPERATURE: 97.5 F | DIASTOLIC BLOOD PRESSURE: 82 MMHG | HEART RATE: 96 BPM | WEIGHT: 189.8 LBS | SYSTOLIC BLOOD PRESSURE: 116 MMHG | OXYGEN SATURATION: 97 % | BODY MASS INDEX: 37.26 KG/M2

## 2024-07-18 DIAGNOSIS — R10.10 UPPER ABDOMINAL PAIN: ICD-10-CM

## 2024-07-18 DIAGNOSIS — G54.0 BRACHIAL PLEXUS PALSY: ICD-10-CM

## 2024-07-18 DIAGNOSIS — R07.89 ATYPICAL CHEST PAIN: ICD-10-CM

## 2024-07-18 DIAGNOSIS — G47.33 OSA (OBSTRUCTIVE SLEEP APNEA): ICD-10-CM

## 2024-07-18 DIAGNOSIS — R29.898 WEAKNESS OF BOTH ARMS: ICD-10-CM

## 2024-07-18 DIAGNOSIS — R55 PRE-SYNCOPE: Primary | ICD-10-CM

## 2024-07-18 LAB
NON-UH HIE A/G RATIO: 1.1
NON-UH HIE ALB: 3.4 G/DL (ref 3.4–5)
NON-UH HIE ALK PHOS: 87 UNIT/L (ref 45–117)
NON-UH HIE BASO COUNT: 0.06 X1000 (ref 0–0.2)
NON-UH HIE BASOS %: 0.7 %
NON-UH HIE BILIRUBIN, TOTAL: 0.3 MG/DL (ref 0.3–1.2)
NON-UH HIE BUN/CREAT RATIO: 14.3
NON-UH HIE BUN: 10 MG/DL (ref 9–23)
NON-UH HIE CALCIUM: 9 MG/DL (ref 8.7–10.4)
NON-UH HIE CALCULATED OSMOLALITY: 278 MOSM/KG (ref 275–295)
NON-UH HIE CHLORIDE: 108 MMOL/L (ref 98–107)
NON-UH HIE CO2, VENOUS: 28 MMOL/L (ref 20–31)
NON-UH HIE CREATININE: 0.7 MG/DL (ref 0.5–0.8)
NON-UH HIE DIFF?: NO
NON-UH HIE EOS COUNT: 0.11 X1000 (ref 0–0.5)
NON-UH HIE EOSIN %: 1.2 %
NON-UH HIE GFR AA: >60
NON-UH HIE GLOBULIN: 3.2 G/DL
NON-UH HIE GLOMERULAR FILTRATION RATE: >60 ML/MIN/1.73M?
NON-UH HIE GLUCOSE: 84 MG/DL (ref 74–106)
NON-UH HIE GOT: 25 UNIT/L (ref 15–37)
NON-UH HIE GPT: 23 UNIT/L (ref 10–49)
NON-UH HIE HCT: 43.1 % (ref 36–46)
NON-UH HIE HGB: 14.5 G/DL (ref 12–16)
NON-UH HIE INSTR WBC: 9.3
NON-UH HIE K: 4.3 MMOL/L (ref 3.5–5.1)
NON-UH HIE LYMPH %: 19.7 %
NON-UH HIE LYMPH COUNT: 1.84 X1000 (ref 1.2–4.8)
NON-UH HIE MCH: 30.2 PG (ref 27–34)
NON-UH HIE MCHC: 33.5 G/DL (ref 32–37)
NON-UH HIE MCV: 90.1 FL (ref 80–100)
NON-UH HIE MONO %: 6.9 %
NON-UH HIE MONO COUNT: 0.65 X1000 (ref 0.1–1)
NON-UH HIE MPV: 8.1 FL (ref 7.4–10.4)
NON-UH HIE NA: 140 MMOL/L (ref 135–145)
NON-UH HIE NEUTROPHIL %: 71.5 %
NON-UH HIE NEUTROPHIL COUNT (ANC): 6.68 X1000 (ref 1.4–8.8)
NON-UH HIE NUCLEATED RBC: 0 /100WBC
NON-UH HIE PLATELET: 381 X10 (ref 150–450)
NON-UH HIE RBC: 4.79 X10 (ref 4.2–5.4)
NON-UH HIE RDW: 15.1 % (ref 11.5–14.5)
NON-UH HIE TOTAL PROTEIN: 6.6 G/DL (ref 5.7–8.2)
NON-UH HIE WBC: 9.3 X10 (ref 4.5–11)

## 2024-07-18 PROCEDURE — 3008F BODY MASS INDEX DOCD: CPT | Performed by: NURSE PRACTITIONER

## 2024-07-18 PROCEDURE — 99214 OFFICE O/P EST MOD 30 MIN: CPT | Performed by: NURSE PRACTITIONER

## 2024-07-18 PROCEDURE — 93000 ELECTROCARDIOGRAM COMPLETE: CPT | Performed by: NURSE PRACTITIONER

## 2024-07-18 PROCEDURE — 1036F TOBACCO NON-USER: CPT | Performed by: NURSE PRACTITIONER

## 2024-07-18 ASSESSMENT — ENCOUNTER SYMPTOMS
FEVER: 0
BACK PAIN: 1
VOMITING: 0
WEAKNESS: 1
SHORTNESS OF BREATH: 1
CHILLS: 0
DIARRHEA: 0
ABDOMINAL PAIN: 1
HEADACHES: 1

## 2024-07-18 ASSESSMENT — PATIENT HEALTH QUESTIONNAIRE - PHQ9
1. LITTLE INTEREST OR PLEASURE IN DOING THINGS: NOT AT ALL
2. FEELING DOWN, DEPRESSED OR HOPELESS: SEVERAL DAYS
SUM OF ALL RESPONSES TO PHQ9 QUESTIONS 1 AND 2: 1

## 2024-07-18 NOTE — PATIENT INSTRUCTIONS
Call cpap company to see if another option for a mask  See ent for other options    See cardio  re: chest pain  EKG normal.    See neuro   Ct head  Labs today    Ultrasound upper abdomen    Keep aug appt      I will communicate with you via [employer] regarding messages and results. If you need help with this, you can call the support line at [phone].    IT WAS A PLEASURE TO SEE YOU TODAY. THANK YOU FOR CHOOSING US FOR YOUR HEALTHCARE NEEDS.

## 2024-07-18 NOTE — PROGRESS NOTES
Subjective   Patient ID: Jean-Claude Ronquillo is a 39 y.o. female who presents for Follow-up.  Last physical:  has appt scheduled 8/15/24  Last labs- 11/2023 Vitamin D is low at 12 . Goal is >30. restart Vitamin D prescription 1 a week. Prescription sent. Recheck lab in 2 months. No fasting needed.  This will be a lifetime medication.  Sugar (aka glucose), kidney function, liver function and electrolytes in the CMP (comprehensive metabolic panel) were normal.  Ldl and trigs normal  Hdl slightly low at 48. Goal >50 for women. Incr exercise.  TSH (thyroid test) was normal.  CBC (complete blood count) was normal which looks at infection and anemia markers.  A1c 5.7    Current sx-  pain in stomach, chest and back, lasted about 30 minutes. Took BP this morning and was 159/99  When did this start- Wednesday at 9 pm started feeling cold sweats and felt like passing out, weaken in hands and arms and couldn't really use hands,. Lasted about 10-15 minutes.     Do EKG pls        HPI  cold sweats 9p just done with dinner, sitting on toilet; felt like passing out and could not move hands or arms from elbow down to finger tips (weakness) 8d ago; lasted 10-15min   Possible heart racing   Had eaten and drank fluids    pain in stomach epigastric, chest and into mid back at bra line 130a this am; lasted about 30 minutes.   Took BP and it was 159/99; HR 81  Sob noted  No vomiting or diarrhea  No fall or injury    HAs more frequent but not migraines  No wheezing, vision change, dizziness  No new meds or otc meds    Lipotrophic shots  Lost wt 11/20/24 233# to today 189#    Decr energy level still    Can't maxwell mask on cpap; has tried a couple diff masks    No care team member to display     Review of Systems   Constitutional: Negative.  Negative for chills and fever.   Respiratory:  Positive for shortness of breath. Negative for wheezing.    Cardiovascular:  Positive for chest pain.        Tachy   Gastrointestinal:  Positive for abdominal  pain. Negative for diarrhea and vomiting.   Musculoskeletal:  Positive for back pain.   Neurological:  Positive for weakness and headaches.        Presyncope       Objective   Visit Vitals  /82   Pulse 96   Temp 36.4 °C (97.5 °F)      BP Readings from Last 3 Encounters:   07/18/24 116/82   11/20/23 138/88   05/03/23 113/81     Wt Readings from Last 3 Encounters:   07/18/24 86.1 kg (189 lb 12.8 oz)   11/20/23 106 kg (233 lb 6.4 oz)   05/03/23 101 kg (223 lb)       Physical Exam  Constitutional:       General: She is not in acute distress.     Appearance: Normal appearance.   HENT:      Head: Normocephalic.      Right Ear: Tympanic membrane, ear canal and external ear normal.      Left Ear: Tympanic membrane, ear canal and external ear normal.      Nose: Nose normal.      Mouth/Throat:      Mouth: Mucous membranes are moist.      Pharynx: No oropharyngeal exudate or posterior oropharyngeal erythema.   Eyes:      Extraocular Movements: Extraocular movements intact.      Pupils: Pupils are equal, round, and reactive to light.      Comments: Red reflex noted   Cardiovascular:      Rate and Rhythm: Normal rate and regular rhythm.      Heart sounds: Normal heart sounds.   Pulmonary:      Effort: Pulmonary effort is normal.      Breath sounds: Normal breath sounds. No wheezing, rhonchi or rales.   Lymphadenopathy:      Cervical: No cervical adenopathy.   Skin:     Findings: No rash.   Neurological:      Mental Status: She is alert.      Comments: Able to do alt mvmts  Able to do finger to nose  Able to do finger to nose to finger  Romberg negative       Assessment/Plan   Diagnoses and all orders for this visit:  Pre-syncope  -     ECG 12 Lead  -     CBC and Auto Differential; Future  -     Comprehensive Metabolic Panel; Future  -     CT head wo IV contrast; Future  -     Referral to Neurology; Future  Brachial plexus palsy  -     CT head wo IV contrast; Future  -     Referral to Neurology; Future  Weakness of both  arms  -     CT head wo IV contrast; Future  -     Referral to Neurology; Future  Upper abdominal pain  -     US biliary system; Future  Atypical chest pain  -     Referral to Cardiology; Future  COCO (obstructive sleep apnea)  -     Referral to ENT; Future

## 2024-07-23 ENCOUNTER — TELEPHONE (OUTPATIENT)
Dept: PRIMARY CARE | Facility: CLINIC | Age: 39
End: 2024-07-23
Payer: COMMERCIAL

## 2024-07-23 DIAGNOSIS — K83.8 COMMON BILE DUCT DILATION: ICD-10-CM

## 2024-07-23 DIAGNOSIS — K80.20 GALLSTONES: Primary | ICD-10-CM

## 2024-07-25 ENCOUNTER — OFFICE VISIT (OUTPATIENT)
Dept: SURGERY | Facility: CLINIC | Age: 39
End: 2024-07-25
Payer: COMMERCIAL

## 2024-07-25 ENCOUNTER — TRANSCRIBE ORDERS (OUTPATIENT)
Dept: SURGERY | Facility: CLINIC | Age: 39
End: 2024-07-25

## 2024-07-25 VITALS — TEMPERATURE: 98.7 F | BODY MASS INDEX: 37.11 KG/M2 | HEIGHT: 60 IN | WEIGHT: 189 LBS

## 2024-07-25 DIAGNOSIS — Z01.818 ENCOUNTER FOR PREOPERATIVE EXAMINATION FOR GENERAL SURGICAL PROCEDURE: ICD-10-CM

## 2024-07-25 DIAGNOSIS — K83.8 COMMON BILE DUCT DILATION: ICD-10-CM

## 2024-07-25 DIAGNOSIS — K80.21 CALCULUS OF GALLBLADDER WITH BILIARY OBSTRUCTION BUT WITHOUT CHOLECYSTITIS: ICD-10-CM

## 2024-07-25 DIAGNOSIS — K80.20 GALLSTONES: ICD-10-CM

## 2024-07-25 PROCEDURE — 99204 OFFICE O/P NEW MOD 45 MIN: CPT | Performed by: SURGERY

## 2024-07-25 PROCEDURE — 3008F BODY MASS INDEX DOCD: CPT | Performed by: SURGERY

## 2024-07-25 PROCEDURE — 1036F TOBACCO NON-USER: CPT | Performed by: SURGERY

## 2024-07-25 RX ORDER — AMOXICILLIN AND CLAVULANATE POTASSIUM 875; 125 MG/1; MG/1
1 TABLET, FILM COATED ORAL 2 TIMES DAILY
Qty: 14 TABLET | Refills: 0 | Status: SHIPPED | OUTPATIENT
Start: 2024-07-25 | End: 2024-08-01

## 2024-07-25 ASSESSMENT — ENCOUNTER SYMPTOMS
CONFUSION: 0
DIZZINESS: 0
DIAPHORESIS: 0
SHORTNESS OF BREATH: 0
AGITATION: 0
VOMITING: 1
CHILLS: 0
FEVER: 0
ACTIVITY CHANGE: 0
ABDOMINAL PAIN: 1
NAUSEA: 1
APPETITE CHANGE: 0

## 2024-07-25 NOTE — PROGRESS NOTES
Subjective   Patient ID: Jean-Claude Ronquillo is a 39 y.o. female who presents for New Patient Visit.    The patient is a 39-year-old woman with a history of hypothyroidism, hiatal hernia with GERD, anxiety, depression, COCO and fibromyalgia who presents for evaluation of epigastric and right upper quadrant postprandial abdominal pain.  She has been having progressively worsening episodes of epigastric and right upper quadrant abdominal pain for the last week now.  Initially the episodes were lasting half hour, but last night she had an episode that lasted around 4 to 5 hours.  She was about to come to the emergency department, but it subsided.  Today she is feeling well, but a little fatigued.  She currently denies any abdominal pain.  She also had an emesis last night.  The pain began about 2 hours after eating.  Today she has been avoiding eating, and only drinking fluids.  She had an ultrasound that shows gallstones along with mild bile duct dilation to 7 mm.  She has normal LFTs with a bilirubin of 0.3.  She has history of hiatal hernia and GERD, but currently denies any GERD issues, and has been off of medication for several years.  Past surgical history includes  section, carpal tunnel surgery, and ankle surgery.    Review of Systems   Constitutional:  Negative for activity change, appetite change, chills, diaphoresis and fever.   Respiratory:  Negative for shortness of breath.    Cardiovascular:  Negative for chest pain.   Gastrointestinal:  Positive for abdominal pain, nausea and vomiting.   Neurological:  Negative for dizziness.   Psychiatric/Behavioral:  Negative for agitation and confusion.    All other systems reviewed and are negative.      Objective   Physical Exam  Vitals reviewed.   HENT:      Head: Normocephalic.   Cardiovascular:      Rate and Rhythm: Normal rate.   Pulmonary:      Effort: Pulmonary effort is normal.   Abdominal:      General: Abdomen is flat.      Palpations: Abdomen is soft.       Tenderness: There is no abdominal tenderness.   Neurological:      General: No focal deficit present.      Mental Status: She is alert.   Psychiatric:         Mood and Affect: Mood normal.         Behavior: Behavior normal.         Assessment/Plan   The patient is a 39-year-old woman who presents for evaluation of symptomatic cholelithiasis.  She has been having progressively worsening episodes of epigastric and right upper quadrant abdominal pains for the last week now.  Currently she is not having any symptoms.  Ultrasound shows gallstones along with mild bile duct dilation to 7 mm.  Likely that the patient has been passing stones over the last week.  I explained to her the risks and benefits going to operating room for a laparoscopic cholecystectomy with intraoperative cholangiogram.  These risk include risk of bleeding, infection, injury to the bile ducts, or injury to surround structures.  The patient agrees to proceed with the operation.  I advised the patient to stay on a bland diet, and avoid any fatty, greasy, and fried foods.  I also advised her that if she develops any worsening episodes that last for several hours to come to the emergency department as this could be a sign of acute cholecystitis.  Will also place her on 1 week of Augmentin prior to her scheduled surgery next week.         Will Moe MD 07/25/24 1:50 PM

## 2024-07-26 ENCOUNTER — APPOINTMENT (OUTPATIENT)
Dept: SURGERY | Facility: CLINIC | Age: 39
End: 2024-07-26
Payer: COMMERCIAL

## 2024-07-30 ENCOUNTER — PRE-ADMISSION TESTING (OUTPATIENT)
Dept: PREADMISSION TESTING | Facility: HOSPITAL | Age: 39
End: 2024-07-30
Payer: COMMERCIAL

## 2024-07-30 ENCOUNTER — LAB (OUTPATIENT)
Dept: LAB | Facility: LAB | Age: 39
End: 2024-07-30
Payer: COMMERCIAL

## 2024-07-30 VITALS
BODY MASS INDEX: 38.01 KG/M2 | WEIGHT: 193.6 LBS | HEART RATE: 106 BPM | DIASTOLIC BLOOD PRESSURE: 87 MMHG | SYSTOLIC BLOOD PRESSURE: 124 MMHG | TEMPERATURE: 98.6 F | HEIGHT: 60 IN | OXYGEN SATURATION: 97 %

## 2024-07-30 DIAGNOSIS — Z01.818 PREOP TESTING: ICD-10-CM

## 2024-07-30 DIAGNOSIS — E55.9 VITAMIN D DEFICIENCY: ICD-10-CM

## 2024-07-30 DIAGNOSIS — Z01.818 PREOP TESTING: Primary | ICD-10-CM

## 2024-07-30 LAB
25(OH)D3 SERPL-MCNC: 22 NG/ML (ref 31–100)
ANION GAP SERPL CALC-SCNC: 13 MMOL/L
BASOPHILS # BLD AUTO: 0.05 X10*3/UL (ref 0–0.1)
BASOPHILS NFR BLD AUTO: 0.6 %
BUN SERPL-MCNC: 8 MG/DL (ref 8–25)
CALCIUM SERPL-MCNC: 8.6 MG/DL (ref 8.5–10.4)
CHLORIDE SERPL-SCNC: 106 MMOL/L (ref 97–107)
CO2 SERPL-SCNC: 20 MMOL/L (ref 24–31)
CREAT SERPL-MCNC: 0.7 MG/DL (ref 0.4–1.6)
EGFRCR SERPLBLD CKD-EPI 2021: >90 ML/MIN/1.73M*2
EOSINOPHIL # BLD AUTO: 0.17 X10*3/UL (ref 0–0.7)
EOSINOPHIL NFR BLD AUTO: 1.9 %
ERYTHROCYTE [DISTWIDTH] IN BLOOD BY AUTOMATED COUNT: 14.7 % (ref 11.5–14.5)
GLUCOSE SERPL-MCNC: 85 MG/DL (ref 65–99)
HCT VFR BLD AUTO: 43.1 % (ref 36–46)
HGB BLD-MCNC: 13.9 G/DL (ref 12–16)
IMM GRANULOCYTES # BLD AUTO: 0.02 X10*3/UL (ref 0–0.7)
IMM GRANULOCYTES NFR BLD AUTO: 0.2 % (ref 0–0.9)
LYMPHOCYTES # BLD AUTO: 1.89 X10*3/UL (ref 1.2–4.8)
LYMPHOCYTES NFR BLD AUTO: 21.1 %
MCH RBC QN AUTO: 30.2 PG (ref 26–34)
MCHC RBC AUTO-ENTMCNC: 32.3 G/DL (ref 32–36)
MCV RBC AUTO: 94 FL (ref 80–100)
MONOCYTES # BLD AUTO: 0.64 X10*3/UL (ref 0.1–1)
MONOCYTES NFR BLD AUTO: 7.1 %
NEUTROPHILS # BLD AUTO: 6.2 X10*3/UL (ref 1.2–7.7)
NEUTROPHILS NFR BLD AUTO: 69.1 %
NRBC BLD-RTO: 0 /100 WBCS (ref 0–0)
PLATELET # BLD AUTO: 353 X10*3/UL (ref 150–450)
POTASSIUM SERPL-SCNC: 4.3 MMOL/L (ref 3.4–5.1)
RBC # BLD AUTO: 4.61 X10*6/UL (ref 4–5.2)
SODIUM SERPL-SCNC: 139 MMOL/L (ref 133–145)
WBC # BLD AUTO: 9 X10*3/UL (ref 4.4–11.3)

## 2024-07-30 PROCEDURE — 85025 COMPLETE CBC W/AUTO DIFF WBC: CPT

## 2024-07-30 PROCEDURE — 80048 BASIC METABOLIC PNL TOTAL CA: CPT

## 2024-07-30 PROCEDURE — 99203 OFFICE O/P NEW LOW 30 MIN: CPT | Performed by: NURSE PRACTITIONER

## 2024-07-30 PROCEDURE — 82306 VITAMIN D 25 HYDROXY: CPT

## 2024-07-30 RX ORDER — IBUPROFEN 200 MG
400 TABLET ORAL EVERY 6 HOURS PRN
COMMUNITY

## 2024-07-30 RX ORDER — ACETAMINOPHEN 500 MG
1000 TABLET ORAL EVERY 6 HOURS PRN
COMMUNITY

## 2024-07-30 ASSESSMENT — DUKE ACTIVITY SCORE INDEX (DASI)
CAN YOU DO YARD WORK LIKE RAKING LEAVES, WEEDING OR PUSHING A MOWER: YES
CAN YOU TAKE CARE OF YOURSELF (EAT, DRESS, BATHE, OR USE TOILET): YES
DASI METS SCORE: 9
CAN YOU CLIMB A FLIGHT OF STAIRS OR WALK UP A HILL: YES
CAN YOU WALK A BLOCK OR TWO ON LEVEL GROUND: YES
TOTAL_SCORE: 50.7
CAN YOU RUN A SHORT DISTANCE: YES
CAN YOU WALK INDOORS, SUCH AS AROUND YOUR HOUSE: YES
CAN YOU DO HEAVY WORK AROUND THE HOUSE LIKE SCRUBBING FLOORS OR LIFTING AND MOVING HEAVY FURNITURE: YES
CAN YOU HAVE SEXUAL RELATIONS: YES
CAN YOU DO LIGHT WORK AROUND THE HOUSE LIKE DUSTING OR WASHING DISHES: YES
CAN YOU DO MODERATE WORK AROUND THE HOUSE LIKE VACUUMING, SWEEPING FLOORS OR CARRYING GROCERIES: YES
CAN YOU PARTICIPATE IN MODERATE RECREATIONAL ACTIVITIES LIKE GOLF, BOWLING, DANCING, DOUBLES TENNIS OR THROWING A BASEBALL OR FOOTBALL: YES
CAN YOU PARTICIPATE IN STRENOUS SPORTS LIKE SWIMMING, SINGLES TENNIS, FOOTBALL, BASKETBALL, OR SKIING: NO

## 2024-07-30 ASSESSMENT — ENCOUNTER SYMPTOMS
PSYCHIATRIC NEGATIVE: 1
NAUSEA: 1
ENDOCRINE NEGATIVE: 1
CONSTITUTIONAL NEGATIVE: 1
HEMATOLOGIC/LYMPHATIC NEGATIVE: 1
DIARRHEA: 1
RESPIRATORY NEGATIVE: 1
MUSCULOSKELETAL NEGATIVE: 1
NEUROLOGICAL NEGATIVE: 1
EYES NEGATIVE: 1
ABDOMINAL PAIN: 1
CARDIOVASCULAR NEGATIVE: 1

## 2024-07-30 ASSESSMENT — PAIN - FUNCTIONAL ASSESSMENT: PAIN_FUNCTIONAL_ASSESSMENT: 0-10

## 2024-07-30 ASSESSMENT — PAIN DESCRIPTION - DESCRIPTORS: DESCRIPTORS: SHARP;BURNING

## 2024-07-30 ASSESSMENT — PAIN SCALES - GENERAL: PAINLEVEL_OUTOF10: 3

## 2024-07-30 NOTE — PREPROCEDURE INSTRUCTIONS
Medication List            Accurate as of July 30, 2024  8:45 AM. Always use your most recent med list.                acetaminophen 500 mg tablet  Commonly known as: Tylenol  Notes to patient: May take morning of surgery if needed     amoxicillin-pot clavulanate 875-125 mg tablet  Commonly known as: Augmentin  Take 1 tablet by mouth 2 times a day for 7 days.  Medication Adjustments for Surgery: Continue until night before surgery     ergocalciferol 1.25 MG (96219 UT) capsule  Commonly known as: Vitamin D-2  Take 1 capsule (50,000 Units) by mouth 1 (one) time per week.  Medication Adjustments for Surgery: Stop 7 days before surgery     gabapentin 600 mg tablet  Commonly known as: Neurontin  TAKE 1 TABLET(600 MG) BY MOUTH THREE TIMES DAILY  Medication Adjustments for Surgery: Take morning of surgery with sip of water, no other fluids     ibuprofen 200 mg tablet  Medication Adjustments for Surgery: Stop 7 days before surgery     IRON ORAL  Medication Adjustments for Surgery: Stop 7 days before surgery     levothyroxine 75 mcg tablet  Commonly known as: Synthroid, Levoxyl  TAKE 1 TABLET BY MOUTH EVERY DAY  Medication Adjustments for Surgery: Take morning of surgery with sip of water, no other fluids     multivitamin tablet  Medication Adjustments for Surgery: Stop 7 days before surgery     Nexplanon 68 mg implant implant  Generic drug: etonogestrel-eluting contraceptive     rizatriptan MLT 10 mg disintegrating tablet  Commonly known as: Maxalt-MLT  Take 1 tablet (10 mg) by mouth 1 time if needed for migraine. May repeat in 2 hours if unresolved. Do not exceed 20 mg in 24 hours.  Notes to patient: Patient not taking     sertraline 100 mg tablet  Commonly known as: Zoloft  Medication Adjustments for Surgery: Take morning of surgery with sip of water, no other fluids                              NPO Instructions:    Do not eat any food after midnight the night before your surgery/procedure.    Additional Instructions:      Day of Surgery:  Review your medication instructions, take indicated medications  Wear  comfortable loose fitting clothing  Do not use moisturizers, creams, lotions or perfume  All jewelry and valuables should be left at home    Preoperative Fasting Guidelines    Why must I stop eating and drinking near surgery time?  With sedation, food or liquid in your stomach can enter your lungs causing serious complications  Increases nausea and vomiting    When do I need to stop eating and drinking before my surgery?  Do not eat any food or drink any liquids after midnight the night before your surgery/procedure.  You may have sips of water to take medications.    PAT DISCHARGE INSTRUCTIONS    Please call the Same Day Surgery (SDS) Department of the hospital where your procedure will be performed after 2:00 PM the day before your surgery. If you are scheduled on a Monday, or a Tuesday following a Monday holiday, you will need to call on the last business day prior to your surgery.    [address], [id number] [phone]  Second Floor    Please let your surgeon know if:      You develop any open sores, shingles, burning or painful urination as these may increase your risk of an infection.   You no longer wish to have the surgery.   Any other personal circumstances change that may lead to the need to cancel or defer this surgery-such as being sick or getting admitted to any hospital within one week of your planned procedure.    Your contact details change, such as a change of address or phone number.    Starting now:     Please DO NOT drink alcohol or smoke for 24 hours before surgery. It is well known that quitting smoking can make a huge difference to your health and recovery from surgery. The longer you abstain from smoking, the better your chances of a healthy recovery. If you need help with quitting, call 0-484-QUIT-NOW to be connected to a trained  counselor who will discuss the best methods to help you quit.     Before your surgery:    Please stop all supplements 7 days prior to surgery. Or as directed by your surgeon.   Please stop taking NSAID pain medicine such as Advil and Motrin 7 days before surgery.    If you develop any fever, cough, cold, rashes, cuts, scratches, scrapes, urinary symptoms or infection anywhere on your body (including teeth and gums) prior to surgery, please call your surgeon’s office as soon as possible. This may require treatment to reduce the chance of cancellation on the day of surgery.    The day before your surgery:   DIET- Please follow the diet instructions at the top of your packet.   Get a good night’s rest.  Use the special soap for bathing if you have been instructed to use one.    Scheduled surgery times may change and you will be notified if this occurs - please check your personal voicemail for any updates.     On the morning of surgery:   Wear comfortable, loose fitting clothes which open in the front. Please do not wear moisturizers, creams, lotions, makeup or perfume.    Please bring with you to surgery:   Photo ID and insurance card   Current list of medicines and allergies   Pacemaker/ Defibrillator/Heart stent cards   CPAP machine and mask    Slings/ splints/ crutches   A copy of your complete advanced directive/DHPOA.    Please do NOT bring with you to surgery:   All jewelry and valuables should be left at home.   Prosthetic devices such as contact lenses, hearing aids, dentures, eyelash extensions, hairpins and body piercings must be removed prior to going in to the surgical suite.    After outpatient surgery:   A responsible adult MUST accompany you at the time of discharge and stay with you for 24 hours after your surgery. You may NOT drive yourself home after surgery.    Do not drive, operate machinery, make critical decisions or do activities that require co-ordination or balance until after a night’s  sleep.   Do not drink alcoholic beverages for 24 hours.   Instructions for resuming your medications will be provided by your surgeon.    CALL YOUR DOCTOR AFTER SURGERY IF YOU HAVE:     Chills and/or a fever of 101° F or higher.    Redness, swelling, pus or drainage from your surgical wound or a bad smell from the wound.    Lightheadedness, fainting or confusion.    Persistent vomiting (throwing up) and are not able to eat or drink for 12 hours.    Three or more loose, watery bowel movements in 24 hours (diarrhea).   Difficulty or pain while urinating( after non-urological surgery)    Pain and swelling in your legs, especially if it is only on one side.    Difficulty breathing or are breathing faster than normal.    Any new concerning symptoms.

## 2024-07-30 NOTE — H&P (VIEW-ONLY)
CPM/PAT Evaluation       Name: Jean-Claude Ronquillo (Jean-Claude Ronquillo)  /Age: 1985/39 y.o.     In-Person       Chief Complaint: Mid epigastric pain     HPI    Pt is a 39 year old female with with mid epigastric pain. Pt reports  a few weeks ago she developed sudden mid epigastric pain, nausea, vomiting, diarrhea, and dizziness. Pt followed up with her PCP who performed a  workup that included imaging that showed:   Bile ducts:   There is no dilatation of the intrahepatic biliary tree. The common bile duct measures 0.7 cm which is mildly dilated.     Gallbladder:   The gallbladder is not well distended. Multiple small echogenic shadowing calculi are seen layering dependently within the gallbladder.. The wall of the gallbladder measures 0.26 cm possibly secondary to lack of gallbladder distention. There is no pericholecystic fluid, or positive Castillo sign.    Pt stated since that severe episode she has avoided eating any greasy or fatty foods. She has occasionally experienced nausea and mild mid epigastric pain. Pt was examined by her surgeon and has been scheduled for laparoscopic cholecystectomy. Pt denies CP, SOB,  or dizziness.      Past Medical History:   Diagnosis Date    Anxiety     Depression     GERD (gastroesophageal reflux disease)     Gestational diabetes mellitus in pregnancy, unspecified control ([facility])     Gestational diabetes mellitus (GDM) in third trimester, gestational diabetes method of control unspecified    Hiatal hernia     Hypothyroidism     Low grade squamous intraepithelial lesion on cytologic smear of cervix (LGSIL)     LGSIL of cervix of undetermined significance    Migraines     Obesity     Other conditions influencing health status     Normal Pap smear    Sleep apnea        Past Surgical History:   Procedure Laterality Date    OTHER SURGICAL HISTORY  2021    [place] tooth extraction    OTHER SURGICAL HISTORY  2021     section    OTHER SURGICAL HISTORY  2021     Endoscopy    OTHER SURGICAL HISTORY  2021    Carpal tunnel surgery     Social History     Tobacco Use    Smoking status: Former     Current packs/day: 0.00     Types: Cigarettes     Quit date:      Years since quittin.5    Smokeless tobacco: Never   Substance Use Topics    Alcohol use: Never     Social History     Substance and Sexual Activity   Drug Use Never       Patient  has no history on file for sexual activity.    Family History   Problem Relation Name Age of Onset    Anxiety disorder Mother      Alcohol abuse Mother      Other (degenerative disc disease) Mother      Hypertension Mother      Other (Lyme disease) Mother      Depression Mother      Depression Father      Bipolar disorder Father      Stroke Brother      ADD / ADHD Daughter      Depression Daughter      Anxiety disorder Father's Sister      Anxiety disorder Paternal Grandmother      Diabetes Paternal Grandmother      Osteoporosis Paternal Grandmother         Allergies   Allergen Reactions    Cat Dander Unknown    Hydrocodone-Acetaminophen Other     Gi upset    Topiramate Unknown     Current Outpatient Medications   Medication Sig Dispense Refill    acetaminophen (Tylenol) 500 mg tablet Take 2 tablets (1,000 mg) by mouth every 6 hours if needed for mild pain (1 - 3) or moderate pain (4 - 6).      amoxicillin-pot clavulanate (Augmentin) 875-125 mg tablet Take 1 tablet by mouth 2 times a day for 7 days. 14 tablet 0    ergocalciferol (Vitamin D-2) 1.25 MG (90031 UT) capsule Take 1 capsule (50,000 Units) by mouth 1 (one) time per week. 15 capsule 4    etonogestrel-eluting contraceptive (Nexplanon) 68 mg implant implant mg EA, Subcutaneous, ONCE, Date: 2022 09:45:00 EST      ferrous sulfate (IRON ORAL) Take 1 tablet by mouth once daily.      gabapentin (Neurontin) 600 mg tablet TAKE 1 TABLET(600 MG) BY MOUTH THREE TIMES DAILY 90 tablet 0    ibuprofen 200 mg tablet Take 2 tablets (400 mg) by mouth every 6 hours if needed for mild  pain (1 - 3) or moderate pain (4 - 6).      levothyroxine (Synthroid, Levoxyl) 75 mcg tablet TAKE 1 TABLET BY MOUTH EVERY DAY 90 tablet 4    multivitamin tablet Take 1 tablet by mouth once daily.      rizatriptan MLT (Maxalt-MLT) 10 mg disintegrating tablet Take 1 tablet (10 mg) by mouth 1 time if needed for migraine. May repeat in 2 hours if unresolved. Do not exceed 20 mg in 24 hours. 9 tablet 0    sertraline (Zoloft) 100 mg tablet Take 1 tablet (100 mg) by mouth once daily. as directed       No current facility-administered medications for this visit.     Review of Systems   Constitutional: Negative.    HENT: Negative.     Eyes: Negative.    Respiratory: Negative.     Cardiovascular: Negative.    Gastrointestinal:  Positive for abdominal pain, diarrhea and nausea.   Endocrine: Negative.    Genitourinary: Negative.    Musculoskeletal: Negative.    Skin: Negative.    Neurological: Negative.    Hematological: Negative.    Psychiatric/Behavioral: Negative.       /87   Pulse 106   Temp 37 °C (98.6 °F) (Temporal)   Ht 1.524 m (5')   Wt 87.8 kg (193 lb 9.6 oz)   SpO2 97%   BMI 37.81 kg/m²     Physical Exam  Constitutional:       Appearance: Normal appearance. She is obese.   HENT:      Head: Normocephalic and atraumatic.      Nose: Nose normal.      Mouth/Throat:      Mouth: Mucous membranes are moist.      Pharynx: Oropharynx is clear.   Eyes:      Extraocular Movements: Extraocular movements intact.      Conjunctiva/sclera: Conjunctivae normal.      Pupils: Pupils are equal, round, and reactive to light.   Cardiovascular:      Rate and Rhythm: Normal rate and regular rhythm.      Pulses: Normal pulses.      Heart sounds: Normal heart sounds.   Pulmonary:      Effort: Pulmonary effort is normal.      Breath sounds: Normal breath sounds.   Abdominal:      General: Bowel sounds are normal.      Palpations: Abdomen is soft.      Tenderness: There is abdominal tenderness (Mild discomfort with palpation over RUQ  and mid epigastric area). There is no guarding or rebound.   Musculoskeletal:         General: Normal range of motion.      Cervical back: Normal range of motion and neck supple.   Skin:     General: Skin is warm and dry.   Neurological:      General: No focal deficit present.      Mental Status: She is alert and oriented to person, place, and time. Mental status is at baseline.   Psychiatric:         Mood and Affect: Mood normal.         Behavior: Behavior normal.         Thought Content: Thought content normal.         Judgment: Judgment normal.        PAT AIRWAY:   Airway:     Mallampati::  II    TM distance::  >3 FB    Neck ROM::  Full    ASA: 3  DASI: 50.7  METS: 9  CHADS:  1.9%  RCRI: 0.4%  STOP BANG: 3    Assessment and Plan:     Calculus of gallbladder with biliary obstruction but without cholecystitis: Cholecystectomy Laparoscopy   COCO: noncompliant with CPAP  Hypothyroidism: pt is managed with levothyroxine. 11/20/2024 TSH: 1.97  BMI: 37.81  GERD/ Hiatal hernia  Anxiety/Depression: pt is taking Zoloft.    CBC and BMP collected in PAT.  EKG completed on 7/18/2024 in Epic.    Kellie Wyatt APRN-AMIRA'

## 2024-07-30 NOTE — CPM/PAT H&P
CPM/PAT Evaluation       Name: Jean-Claude Ronquillo (Jean-Claude Ronquillo)  /Age: 1985/39 y.o.     In-Person       Chief Complaint: Mid epigastric pain     HPI    Pt is a 39 year old female with with mid epigastric pain. Pt reports  a few weeks ago she developed sudden mid epigastric pain, nausea, vomiting, diarrhea, and dizziness. Pt followed up with her PCP who performed a  workup that included imaging that showed:   Bile ducts:   There is no dilatation of the intrahepatic biliary tree. The common bile duct measures 0.7 cm which is mildly dilated.     Gallbladder:   The gallbladder is not well distended. Multiple small echogenic shadowing calculi are seen layering dependently within the gallbladder.. The wall of the gallbladder measures 0.26 cm possibly secondary to lack of gallbladder distention. There is no pericholecystic fluid, or positive Castillo sign.    Pt stated since that severe episode she has avoided eating any greasy or fatty foods. She has occasionally experienced nausea and mild mid epigastric pain. Pt was examined by her surgeon and has been scheduled for laparoscopic cholecystectomy. Pt denies CP, SOB,  or dizziness.      Past Medical History:   Diagnosis Date    Anxiety     Depression     GERD (gastroesophageal reflux disease)     Gestational diabetes mellitus in pregnancy, unspecified control ([facility])     Gestational diabetes mellitus (GDM) in third trimester, gestational diabetes method of control unspecified    Hiatal hernia     Hypothyroidism     Low grade squamous intraepithelial lesion on cytologic smear of cervix (LGSIL)     LGSIL of cervix of undetermined significance    Migraines     Obesity     Other conditions influencing health status     Normal Pap smear    Sleep apnea        Past Surgical History:   Procedure Laterality Date    OTHER SURGICAL HISTORY  2021    [place] tooth extraction    OTHER SURGICAL HISTORY  2021     section    OTHER SURGICAL HISTORY  2021     Endoscopy    OTHER SURGICAL HISTORY  2021    Carpal tunnel surgery     Social History     Tobacco Use    Smoking status: Former     Current packs/day: 0.00     Types: Cigarettes     Quit date:      Years since quittin.5    Smokeless tobacco: Never   Substance Use Topics    Alcohol use: Never     Social History     Substance and Sexual Activity   Drug Use Never       Patient  has no history on file for sexual activity.    Family History   Problem Relation Name Age of Onset    Anxiety disorder Mother      Alcohol abuse Mother      Other (degenerative disc disease) Mother      Hypertension Mother      Other (Lyme disease) Mother      Depression Mother      Depression Father      Bipolar disorder Father      Stroke Brother      ADD / ADHD Daughter      Depression Daughter      Anxiety disorder Father's Sister      Anxiety disorder Paternal Grandmother      Diabetes Paternal Grandmother      Osteoporosis Paternal Grandmother         Allergies   Allergen Reactions    Cat Dander Unknown    Hydrocodone-Acetaminophen Other     Gi upset    Topiramate Unknown     Current Outpatient Medications   Medication Sig Dispense Refill    acetaminophen (Tylenol) 500 mg tablet Take 2 tablets (1,000 mg) by mouth every 6 hours if needed for mild pain (1 - 3) or moderate pain (4 - 6).      amoxicillin-pot clavulanate (Augmentin) 875-125 mg tablet Take 1 tablet by mouth 2 times a day for 7 days. 14 tablet 0    ergocalciferol (Vitamin D-2) 1.25 MG (14222 UT) capsule Take 1 capsule (50,000 Units) by mouth 1 (one) time per week. 15 capsule 4    etonogestrel-eluting contraceptive (Nexplanon) 68 mg implant implant mg EA, Subcutaneous, ONCE, Date: 2022 09:45:00 EST      ferrous sulfate (IRON ORAL) Take 1 tablet by mouth once daily.      gabapentin (Neurontin) 600 mg tablet TAKE 1 TABLET(600 MG) BY MOUTH THREE TIMES DAILY 90 tablet 0    ibuprofen 200 mg tablet Take 2 tablets (400 mg) by mouth every 6 hours if needed for mild  pain (1 - 3) or moderate pain (4 - 6).      levothyroxine (Synthroid, Levoxyl) 75 mcg tablet TAKE 1 TABLET BY MOUTH EVERY DAY 90 tablet 4    multivitamin tablet Take 1 tablet by mouth once daily.      rizatriptan MLT (Maxalt-MLT) 10 mg disintegrating tablet Take 1 tablet (10 mg) by mouth 1 time if needed for migraine. May repeat in 2 hours if unresolved. Do not exceed 20 mg in 24 hours. 9 tablet 0    sertraline (Zoloft) 100 mg tablet Take 1 tablet (100 mg) by mouth once daily. as directed       No current facility-administered medications for this visit.     Review of Systems   Constitutional: Negative.    HENT: Negative.     Eyes: Negative.    Respiratory: Negative.     Cardiovascular: Negative.    Gastrointestinal:  Positive for abdominal pain, diarrhea and nausea.   Endocrine: Negative.    Genitourinary: Negative.    Musculoskeletal: Negative.    Skin: Negative.    Neurological: Negative.    Hematological: Negative.    Psychiatric/Behavioral: Negative.       /87   Pulse 106   Temp 37 °C (98.6 °F) (Temporal)   Ht 1.524 m (5')   Wt 87.8 kg (193 lb 9.6 oz)   SpO2 97%   BMI 37.81 kg/m²     Physical Exam  Constitutional:       Appearance: Normal appearance. She is obese.   HENT:      Head: Normocephalic and atraumatic.      Nose: Nose normal.      Mouth/Throat:      Mouth: Mucous membranes are moist.      Pharynx: Oropharynx is clear.   Eyes:      Extraocular Movements: Extraocular movements intact.      Conjunctiva/sclera: Conjunctivae normal.      Pupils: Pupils are equal, round, and reactive to light.   Cardiovascular:      Rate and Rhythm: Normal rate and regular rhythm.      Pulses: Normal pulses.      Heart sounds: Normal heart sounds.   Pulmonary:      Effort: Pulmonary effort is normal.      Breath sounds: Normal breath sounds.   Abdominal:      General: Bowel sounds are normal.      Palpations: Abdomen is soft.      Tenderness: There is abdominal tenderness (Mild discomfort with palpation over RUQ  and mid epigastric area). There is no guarding or rebound.   Musculoskeletal:         General: Normal range of motion.      Cervical back: Normal range of motion and neck supple.   Skin:     General: Skin is warm and dry.   Neurological:      General: No focal deficit present.      Mental Status: She is alert and oriented to person, place, and time. Mental status is at baseline.   Psychiatric:         Mood and Affect: Mood normal.         Behavior: Behavior normal.         Thought Content: Thought content normal.         Judgment: Judgment normal.        PAT AIRWAY:   Airway:     Mallampati::  II    TM distance::  >3 FB    Neck ROM::  Full    ASA: 3  DASI: 50.7  METS: 9  CHADS:  1.9%  RCRI: 0.4%  STOP BANG: 3    Assessment and Plan:     Calculus of gallbladder with biliary obstruction but without cholecystitis: Cholecystectomy Laparoscopy   COCO: noncompliant with CPAP  Hypothyroidism: pt is managed with levothyroxine. 11/20/2024 TSH: 1.97  BMI: 37.81  GERD/ Hiatal hernia  Anxiety/Depression: pt is taking Zoloft.    CBC and BMP collected in PAT.  EKG completed on 7/18/2024 in Epic.    Kellie Wyatt APRN-AMIRA'

## 2024-07-31 ENCOUNTER — PATIENT MESSAGE (OUTPATIENT)
Dept: PRIMARY CARE | Facility: CLINIC | Age: 39
End: 2024-07-31
Payer: COMMERCIAL

## 2024-07-31 ENCOUNTER — ANESTHESIA EVENT (OUTPATIENT)
Dept: OPERATING ROOM | Facility: HOSPITAL | Age: 39
End: 2024-07-31
Payer: COMMERCIAL

## 2024-07-31 DIAGNOSIS — M79.7 FIBROMYALGIA: ICD-10-CM

## 2024-07-31 DIAGNOSIS — F41.9 ANXIETY: Primary | ICD-10-CM

## 2024-07-31 DIAGNOSIS — F33.0 MILD EPISODE OF RECURRENT MAJOR DEPRESSIVE DISORDER (CMS-HCC): ICD-10-CM

## 2024-07-31 DIAGNOSIS — E55.9 VITAMIN D DEFICIENCY: ICD-10-CM

## 2024-07-31 RX ORDER — SERTRALINE HYDROCHLORIDE 100 MG/1
100 TABLET, FILM COATED ORAL DAILY
Qty: 90 TABLET | Refills: 2 | Status: SHIPPED | OUTPATIENT
Start: 2024-07-31

## 2024-07-31 RX ORDER — GABAPENTIN 600 MG/1
TABLET ORAL
Qty: 90 TABLET | Refills: 6 | Status: SHIPPED | OUTPATIENT
Start: 2024-07-31

## 2024-07-31 RX ORDER — ERGOCALCIFEROL 1.25 MG/1
CAPSULE ORAL
Qty: 24 CAPSULE | Refills: 4 | Status: SHIPPED | OUTPATIENT
Start: 2024-07-31

## 2024-08-01 ENCOUNTER — HOSPITAL ENCOUNTER (OUTPATIENT)
Facility: HOSPITAL | Age: 39
Setting detail: OUTPATIENT SURGERY
Discharge: HOME | End: 2024-08-01
Attending: SURGERY | Admitting: SURGERY
Payer: COMMERCIAL

## 2024-08-01 ENCOUNTER — ANESTHESIA (OUTPATIENT)
Dept: OPERATING ROOM | Facility: HOSPITAL | Age: 39
End: 2024-08-01
Payer: COMMERCIAL

## 2024-08-01 ENCOUNTER — APPOINTMENT (OUTPATIENT)
Dept: RADIOLOGY | Facility: HOSPITAL | Age: 39
End: 2024-08-01
Payer: COMMERCIAL

## 2024-08-01 ENCOUNTER — PHARMACY VISIT (OUTPATIENT)
Dept: PHARMACY | Facility: CLINIC | Age: 39
End: 2024-08-01
Payer: MEDICARE

## 2024-08-01 VITALS
DIASTOLIC BLOOD PRESSURE: 68 MMHG | RESPIRATION RATE: 10 BRPM | SYSTOLIC BLOOD PRESSURE: 103 MMHG | HEART RATE: 82 BPM | TEMPERATURE: 96.8 F | OXYGEN SATURATION: 92 %

## 2024-08-01 DIAGNOSIS — G89.18 ACUTE POST-OPERATIVE PAIN: Primary | ICD-10-CM

## 2024-08-01 DIAGNOSIS — K80.21 CALCULUS OF GALLBLADDER WITH BILIARY OBSTRUCTION BUT WITHOUT CHOLECYSTITIS: ICD-10-CM

## 2024-08-01 LAB — PREGNANCY TEST URINE, POC: NEGATIVE

## 2024-08-01 PROCEDURE — 47563 LAPARO CHOLECYSTECTOMY/GRAPH: CPT | Performed by: SURGERY

## 2024-08-01 PROCEDURE — A47562 PR LAP,CHOLECYSTECTOMY: Performed by: NURSE ANESTHETIST, CERTIFIED REGISTERED

## 2024-08-01 PROCEDURE — 3600000003 HC OR TIME - INITIAL BASE CHARGE - PROCEDURE LEVEL THREE: Performed by: SURGERY

## 2024-08-01 PROCEDURE — 47562 LAPAROSCOPIC CHOLECYSTECTOMY: CPT | Performed by: PHYSICIAN ASSISTANT

## 2024-08-01 PROCEDURE — 7100000001 HC RECOVERY ROOM TIME - INITIAL BASE CHARGE: Performed by: SURGERY

## 2024-08-01 PROCEDURE — 2500000005 HC RX 250 GENERAL PHARMACY W/O HCPCS: Performed by: NURSE ANESTHETIST, CERTIFIED REGISTERED

## 2024-08-01 PROCEDURE — A47562 PR LAP,CHOLECYSTECTOMY: Performed by: ANESTHESIOLOGY

## 2024-08-01 PROCEDURE — 3700000001 HC GENERAL ANESTHESIA TIME - INITIAL BASE CHARGE: Performed by: SURGERY

## 2024-08-01 PROCEDURE — 3600000008 HC OR TIME - EACH INCREMENTAL 1 MINUTE - PROCEDURE LEVEL THREE: Performed by: SURGERY

## 2024-08-01 PROCEDURE — 3700000002 HC GENERAL ANESTHESIA TIME - EACH INCREMENTAL 1 MINUTE: Performed by: SURGERY

## 2024-08-01 PROCEDURE — 2500000004 HC RX 250 GENERAL PHARMACY W/ HCPCS (ALT 636 FOR OP/ED): Performed by: ANESTHESIOLOGY

## 2024-08-01 PROCEDURE — 2500000001 HC RX 250 WO HCPCS SELF ADMINISTERED DRUGS (ALT 637 FOR MEDICARE OP): Performed by: SURGERY

## 2024-08-01 PROCEDURE — 7100000002 HC RECOVERY ROOM TIME - EACH INCREMENTAL 1 MINUTE: Performed by: SURGERY

## 2024-08-01 PROCEDURE — RXMED WILLOW AMBULATORY MEDICATION CHARGE

## 2024-08-01 PROCEDURE — 81025 URINE PREGNANCY TEST: CPT | Performed by: SURGERY

## 2024-08-01 PROCEDURE — 7100000010 HC PHASE TWO TIME - EACH INCREMENTAL 1 MINUTE: Performed by: SURGERY

## 2024-08-01 PROCEDURE — 2720000007 HC OR 272 NO HCPCS: Performed by: SURGERY

## 2024-08-01 PROCEDURE — 2500000001 HC RX 250 WO HCPCS SELF ADMINISTERED DRUGS (ALT 637 FOR MEDICARE OP): Performed by: ANESTHESIOLOGY

## 2024-08-01 PROCEDURE — 7100000009 HC PHASE TWO TIME - INITIAL BASE CHARGE: Performed by: SURGERY

## 2024-08-01 PROCEDURE — 2780000003 HC OR 278 NO HCPCS: Performed by: SURGERY

## 2024-08-01 PROCEDURE — 2500000004 HC RX 250 GENERAL PHARMACY W/ HCPCS (ALT 636 FOR OP/ED): Performed by: SURGERY

## 2024-08-01 PROCEDURE — 2500000004 HC RX 250 GENERAL PHARMACY W/ HCPCS (ALT 636 FOR OP/ED): Performed by: NURSE ANESTHETIST, CERTIFIED REGISTERED

## 2024-08-01 PROCEDURE — 88304 TISSUE EXAM BY PATHOLOGIST: CPT | Mod: TC | Performed by: SURGERY

## 2024-08-01 PROCEDURE — A4550 SURGICAL TRAYS: HCPCS | Performed by: SURGERY

## 2024-08-01 PROCEDURE — 76000 FLUOROSCOPY <1 HR PHYS/QHP: CPT

## 2024-08-01 PROCEDURE — 2500000005 HC RX 250 GENERAL PHARMACY W/O HCPCS: Performed by: SURGERY

## 2024-08-01 RX ORDER — LIDOCAINE HYDROCHLORIDE 10 MG/ML
INJECTION INFILTRATION; PERINEURAL AS NEEDED
Status: DISCONTINUED | OUTPATIENT
Start: 2024-08-01 | End: 2024-08-01

## 2024-08-01 RX ORDER — INDOCYANINE GREEN AND WATER 25 MG
5 KIT INJECTION ONCE
Status: COMPLETED | OUTPATIENT
Start: 2024-08-01 | End: 2024-08-01

## 2024-08-01 RX ORDER — KETOROLAC TROMETHAMINE 30 MG/ML
INJECTION, SOLUTION INTRAMUSCULAR; INTRAVENOUS AS NEEDED
Status: DISCONTINUED | OUTPATIENT
Start: 2024-08-01 | End: 2024-08-01

## 2024-08-01 RX ORDER — MEPERIDINE HYDROCHLORIDE 25 MG/ML
12.5 INJECTION INTRAMUSCULAR; INTRAVENOUS; SUBCUTANEOUS EVERY 10 MIN PRN
Status: DISCONTINUED | OUTPATIENT
Start: 2024-08-01 | End: 2024-08-01 | Stop reason: HOSPADM

## 2024-08-01 RX ORDER — SODIUM CHLORIDE, SODIUM LACTATE, POTASSIUM CHLORIDE, CALCIUM CHLORIDE 600; 310; 30; 20 MG/100ML; MG/100ML; MG/100ML; MG/100ML
100 INJECTION, SOLUTION INTRAVENOUS CONTINUOUS
Status: DISCONTINUED | OUTPATIENT
Start: 2024-08-01 | End: 2024-08-01 | Stop reason: HOSPADM

## 2024-08-01 RX ORDER — ACETAMINOPHEN 10 MG/ML
1000 INJECTION, SOLUTION INTRAVENOUS ONCE AS NEEDED
Status: COMPLETED | OUTPATIENT
Start: 2024-08-01 | End: 2024-08-01

## 2024-08-01 RX ORDER — PROPOFOL 10 MG/ML
INJECTION, EMULSION INTRAVENOUS AS NEEDED
Status: DISCONTINUED | OUTPATIENT
Start: 2024-08-01 | End: 2024-08-01

## 2024-08-01 RX ORDER — OXYCODONE HYDROCHLORIDE 5 MG/1
5 TABLET ORAL ONCE
Status: COMPLETED | OUTPATIENT
Start: 2024-08-01 | End: 2024-08-01

## 2024-08-01 RX ORDER — LABETALOL HYDROCHLORIDE 5 MG/ML
5 INJECTION, SOLUTION INTRAVENOUS ONCE AS NEEDED
Status: DISCONTINUED | OUTPATIENT
Start: 2024-08-01 | End: 2024-08-01 | Stop reason: HOSPADM

## 2024-08-01 RX ORDER — ONDANSETRON HYDROCHLORIDE 2 MG/ML
INJECTION, SOLUTION INTRAVENOUS AS NEEDED
Status: DISCONTINUED | OUTPATIENT
Start: 2024-08-01 | End: 2024-08-01

## 2024-08-01 RX ORDER — PHENYLEPHRINE HCL IN 0.9% NACL 1 MG/10 ML
SYRINGE (ML) INTRAVENOUS AS NEEDED
Status: DISCONTINUED | OUTPATIENT
Start: 2024-08-01 | End: 2024-08-01

## 2024-08-01 RX ORDER — OXYCODONE HYDROCHLORIDE 5 MG/1
5 TABLET ORAL EVERY 4 HOURS PRN
Status: DISCONTINUED | OUTPATIENT
Start: 2024-08-01 | End: 2024-08-01 | Stop reason: HOSPADM

## 2024-08-01 RX ORDER — FENTANYL CITRATE 50 UG/ML
50 INJECTION, SOLUTION INTRAMUSCULAR; INTRAVENOUS EVERY 5 MIN PRN
Status: DISCONTINUED | OUTPATIENT
Start: 2024-08-01 | End: 2024-08-01 | Stop reason: HOSPADM

## 2024-08-01 RX ORDER — OXYCODONE HYDROCHLORIDE 5 MG/1
5 TABLET ORAL EVERY 6 HOURS PRN
Qty: 16 TABLET | Refills: 0 | Status: SHIPPED | OUTPATIENT
Start: 2024-08-01 | End: 2024-08-05

## 2024-08-01 RX ORDER — LIDOCAINE HYDROCHLORIDE AND EPINEPHRINE 10; 10 MG/ML; UG/ML
INJECTION, SOLUTION INFILTRATION; PERINEURAL AS NEEDED
Status: DISCONTINUED | OUTPATIENT
Start: 2024-08-01 | End: 2024-08-01 | Stop reason: HOSPADM

## 2024-08-01 RX ORDER — CEFAZOLIN SODIUM 2 G/100ML
2 INJECTION, SOLUTION INTRAVENOUS ONCE
Status: DISCONTINUED | OUTPATIENT
Start: 2024-08-01 | End: 2024-08-01 | Stop reason: HOSPADM

## 2024-08-01 RX ORDER — FENTANYL CITRATE 50 UG/ML
25 INJECTION, SOLUTION INTRAMUSCULAR; INTRAVENOUS EVERY 5 MIN PRN
Status: DISCONTINUED | OUTPATIENT
Start: 2024-08-01 | End: 2024-08-01 | Stop reason: HOSPADM

## 2024-08-01 RX ORDER — LIDOCAINE HYDROCHLORIDE 10 MG/ML
0.1 INJECTION INFILTRATION; PERINEURAL ONCE
Status: DISCONTINUED | OUTPATIENT
Start: 2024-08-01 | End: 2024-08-01 | Stop reason: HOSPADM

## 2024-08-01 RX ORDER — DOCUSATE SODIUM 100 MG/1
100 CAPSULE, LIQUID FILLED ORAL 2 TIMES DAILY
Qty: 14 CAPSULE | Refills: 0 | Status: SHIPPED | OUTPATIENT
Start: 2024-08-01 | End: 2024-08-08

## 2024-08-01 RX ORDER — HYDROMORPHONE HYDROCHLORIDE 1 MG/ML
INJECTION, SOLUTION INTRAMUSCULAR; INTRAVENOUS; SUBCUTANEOUS AS NEEDED
Status: DISCONTINUED | OUTPATIENT
Start: 2024-08-01 | End: 2024-08-01

## 2024-08-01 RX ORDER — BUPIVACAINE HYDROCHLORIDE 5 MG/ML
INJECTION, SOLUTION PERINEURAL AS NEEDED
Status: DISCONTINUED | OUTPATIENT
Start: 2024-08-01 | End: 2024-08-01 | Stop reason: HOSPADM

## 2024-08-01 RX ORDER — ONDANSETRON HYDROCHLORIDE 2 MG/ML
4 INJECTION, SOLUTION INTRAVENOUS ONCE AS NEEDED
Status: DISCONTINUED | OUTPATIENT
Start: 2024-08-01 | End: 2024-08-01 | Stop reason: HOSPADM

## 2024-08-01 RX ORDER — FENTANYL CITRATE 50 UG/ML
INJECTION, SOLUTION INTRAMUSCULAR; INTRAVENOUS AS NEEDED
Status: DISCONTINUED | OUTPATIENT
Start: 2024-08-01 | End: 2024-08-01

## 2024-08-01 RX ORDER — MIDAZOLAM HYDROCHLORIDE 1 MG/ML
INJECTION, SOLUTION INTRAMUSCULAR; INTRAVENOUS AS NEEDED
Status: DISCONTINUED | OUTPATIENT
Start: 2024-08-01 | End: 2024-08-01

## 2024-08-01 RX ORDER — ROCURONIUM BROMIDE 10 MG/ML
INJECTION, SOLUTION INTRAVENOUS AS NEEDED
Status: DISCONTINUED | OUTPATIENT
Start: 2024-08-01 | End: 2024-08-01

## 2024-08-01 RX ORDER — CEFAZOLIN 1 G/1
INJECTION, POWDER, FOR SOLUTION INTRAVENOUS AS NEEDED
Status: DISCONTINUED | OUTPATIENT
Start: 2024-08-01 | End: 2024-08-01

## 2024-08-01 RX ORDER — LABETALOL HYDROCHLORIDE 5 MG/ML
INJECTION, SOLUTION INTRAVENOUS AS NEEDED
Status: DISCONTINUED | OUTPATIENT
Start: 2024-08-01 | End: 2024-08-01

## 2024-08-01 ASSESSMENT — PAIN SCALES - GENERAL
PAINLEVEL_OUTOF10: 9
PAINLEVEL_OUTOF10: 9
PAINLEVEL_OUTOF10: 8
PAINLEVEL_OUTOF10: 9
PAINLEVEL_OUTOF10: 5 - MODERATE PAIN
PAINLEVEL_OUTOF10: 8
PAINLEVEL_OUTOF10: 9
PAINLEVEL_OUTOF10: 7
PAINLEVEL_OUTOF10: 9
PAINLEVEL_OUTOF10: 7
PAINLEVEL_OUTOF10: 7
PAINLEVEL_OUTOF10: 9
PAINLEVEL_OUTOF10: 9
PAINLEVEL_OUTOF10: 7
PAINLEVEL_OUTOF10: 8
PAINLEVEL_OUTOF10: 1
PAINLEVEL_OUTOF10: 9
PAINLEVEL_OUTOF10: 5 - MODERATE PAIN

## 2024-08-01 ASSESSMENT — PAIN DESCRIPTION - DESCRIPTORS
DESCRIPTORS: ACHING;CRAMPING
DESCRIPTORS: ACHING;CRAMPING
DESCRIPTORS: BURNING
DESCRIPTORS: ACHING;CRAMPING
DESCRIPTORS: CRAMPING;ACHING
DESCRIPTORS: ACHING;CRAMPING
DESCRIPTORS: ACHING
DESCRIPTORS: BURNING;JABBING;STABBING

## 2024-08-01 ASSESSMENT — PAIN - FUNCTIONAL ASSESSMENT
PAIN_FUNCTIONAL_ASSESSMENT: 0-10
PAIN_FUNCTIONAL_ASSESSMENT: CPOT (CRITICAL CARE PAIN OBSERVATION TOOL)
PAIN_FUNCTIONAL_ASSESSMENT: 0-10

## 2024-08-01 ASSESSMENT — PAIN DESCRIPTION - ORIENTATION: ORIENTATION: RIGHT

## 2024-08-01 ASSESSMENT — PAIN DESCRIPTION - LOCATION: LOCATION: ABDOMEN

## 2024-08-01 ASSESSMENT — PAIN SCALES - PAIN ASSESSMENT IN ADVANCED DEMENTIA (PAINAD): TOTALSCORE: COLD PACK

## 2024-08-01 NOTE — NURSING NOTE
Pt states pain is better now, 5/10 and is ready to go home. Discharge instructions reviewed with pt, verbalizes understanding, assisted to side of bed to get dressed, steady on her feet

## 2024-08-01 NOTE — POST-PROCEDURE NOTE
Pt received from pacu nurse, pt in significant amt of pain 7/10 right side of abd radiating towards her back. States po analgesic given earlier is not helping her. 4 lap incisions sealed with dermabond, well approximated, no drainage. Abd and back where pt complaining of pain palpated and is soft. Ice pack in place on back and abd, pt tolerating karrie crackers and coke. VSS. Will call MD to update

## 2024-08-01 NOTE — OP NOTE
Cholecystectomy Laparoscopy Operative Note     Date: 2024  OR Location: [facility]    Name: Jean-Claude Ronquillo, : 1985, Age: 39 y.o., MRN: [id number], Sex: female    Diagnosis  Pre-op Diagnosis      * Calculus of gallbladder with biliary obstruction but without cholecystitis [K80.21] Post-op Diagnosis     * Calculus of gallbladder with biliary obstruction but without cholecystitis [K80.21]     Procedures  Cholecystectomy Laparoscopy  33010 - [place] LAPAROSCOPY SURG CHOLECYSTECTOMY      Surgeons      * Will Moe V - Primary    Resident/Fellow/Other Assistant:  Surgeons and Role:     * Uri Azul PA-C - [facility] Assist    Procedure Summary  Anesthesia: General  ASA: III  Anesthesia Staff: Anesthesiologist: Dashawn Waters MD  CRNA: HELIO Sabillon-CRNA  Estimated Blood Loss: 10 mL  Intra-op Medications:   Administrations occurring from 1330 to 1515 on 24:   Medication Name Total Dose   BUPivacaine HCl (Marcaine) 0.5 % (5 mg/mL) injection 20 mL   lidocaine-epinephrine (Xylocaine W/EPI) 1 %-1:100,000 injection 20 mL              Anesthesia Record               Intraprocedure I/O Totals          Intake    LR bolus 1000.00 mL    Total Intake 1000 mL          Specimen:   ID Type Source Tests Collected by Time   1 : Gallbladder Tissue GALLBLADDER CHOLECYSTECTOMY SURGICAL PATHOLOGY EXAM Will COHEN MD 2024 1416        Staff:   Circulator: Jocelyne Hurst Person: Mo Izaguirreub Person: Kellie  Circulator: Dorinda      Findings: Chronic cholecystitis.  Free flow of contrast down the cystic duct, common bile duct, and into the duodenum with no evidence of blockage.      Indications: Jean-Claude Ronquillo is an 39 y.o. female who is having surgery for Calculus of gallbladder with biliary obstruction but without cholecystitis [K80.21].  The patient presented to my clinic for evaluation of epigastric and right upper quadrant postprandial abdominal pain.  She had an ultrasound that showed gallstones along  with mild gallbladder wall thickening and distention of her common bile duct.  She had normal LFTs.  I explained to her the risks and benefits of going to operating room for a laparoscopic cholecystectomy.  These risks include risk of bleeding, infection, injury to the bile duct, or injury to surround structures.  The patient agreed to proceed with the operation.    The patient was seen in the preoperative area. The risks, benefits, complications, treatment options, non-operative alternatives, expected recovery and outcomes were discussed with the patient. The possibilities of reaction to medication, pulmonary aspiration, injury to surrounding structures, bleeding, recurrent infection, the need for additional procedures, failure to diagnose a condition, and creating a complication requiring transfusion or operation were discussed with the patient. The patient concurred with the proposed plan, giving informed consent.  The site of surgery was properly noted/marked if necessary per policy. The patient has been actively warmed in preoperative area. Preoperative antibiotics have been ordered and given within 1 hours of incision. Venous thrombosis prophylaxis have been ordered including bilateral sequential compression devices    Procedure Details:     The patient was taken to the operating room, placed supine on the operating table.  Time-out was performed.  General anesthesia was induced.  She received antibiotics.  The abdomen was prepped and draped in a sterile fashion.  We made a Supraumbilical midline incision, placed a 12 mm Alyse trocar.  We established insufflation. We placed a 5 mm subxiphoid port and then two 5 mm right subcostal ports.  We retracted the gallbladder up and over the right lobe of the liver.  There were attachments of the omentum to the gallbladder suggesting chronic inflammation, these were taken down.  We then retracted the infundibulum laterally and inferiorly, and this nicely exposed the  "triangle of Calot.  We circumferentially dissected the cystic duct and cystic artery free from surrounding structures, thus exposing the cystic duct and cystic artery along with the lower 1/3 of the gallbladder fossa, thus achieving the \"critical view.\"  We doubly ligated the cystic artery using clips and divided it.  We then performed a ductotomy.  We placed a clip at the base of the gallbladder/cystic duct junction.  We introduced our cholangiocatheter.  We performed a cholangiogram that showed free flow of contrast into radicals of the liver as well as into the duodenum.  There were no filling defects.  We removed our cholangiocatheter and then doubly ligated the cystic duct and divided it.  We then excised the gallbladder from the inferior aspect of the liver using the cautery.  Once excised, we removed the gallbladder specimen using an Endo Catch bag.  We re-established insufflation and after ensuring hemostasis we placed some Surgicel powder in the gallbladder fossa, and we removed our ports, closed our midline fascial defect using 0-Vicryl, and then closed the skin using 4-0 subcuticular Monocryl stitches followed by Dermabond glue.  The patient tolerated the procedure without difficulty and was returned to the recovery room in stable condition.        Complications:  None; patient tolerated the procedure well.    Disposition: PACU - hemodynamically stable.  Condition: stable       Attending Attestation: I was present and scrubbed for the entire procedure.    Will Moe V  Phone Number: [phone]      "

## 2024-08-01 NOTE — ANESTHESIA POSTPROCEDURE EVALUATION
Patient: Jean-Claude Ronquillo    Procedure Summary       Date: 08/01/24 Room / Location: [facility] OR 11 / Virtual [facility]    Anesthesia Start: 1336 Anesthesia Stop: 1515    Procedure: Cholecystectomy Laparoscopy Diagnosis:       Calculus of gallbladder with biliary obstruction but without cholecystitis      (Calculus of gallbladder with biliary obstruction but without cholecystitis [K80.21])    Surgeons: Will COHEN MD Responsible Provider: Dashawn Watesr MD    Anesthesia Type: general ASA Status: 3            Anesthesia Type: general    Vitals Value Taken Time   BP 90/55 08/01/24 1533   Temp 36.1 °C (97 °F) 08/01/24 1509   Pulse 81 08/01/24 1535   Resp 32 08/01/24 1535   SpO2 99 % 08/01/24 1535   Vitals shown include unfiled device data.    Anesthesia Post Evaluation    Patient location during evaluation: PACU  Patient participation: complete - patient participated  Level of consciousness: awake  Pain management: adequate  Airway patency: patent  Cardiovascular status: acceptable  Respiratory status: acceptable  Hydration status: acceptable  Postoperative Nausea and Vomiting: none        No notable events documented.

## 2024-08-01 NOTE — ANESTHESIA PREPROCEDURE EVALUATION
Patient: Jean-Claude Ronquillo    Procedure Information       Date/Time: 08/01/24 1330    Procedure: Cholecystectomy Laparoscopy    Location: [facility] 11 / Virtual [facility]    Surgeons: Will COHEN MD            Relevant Problems   Pulmonary   (+) COCO (obstructive sleep apnea)      Neuro   (+) Anxiety   (+) DAVID (generalized anxiety disorder)   (+) Mild episode of recurrent major depressive disorder (CMS-HCC)   (+) Right carpal tunnel syndrome      GI   (+) Hiatal hernia with GERD      Liver   (+) Calculus of gallbladder with biliary obstruction but without cholecystitis      Endocrine   (+) Class 3 severe obesity in adult (Multi)   (+) Hypothyroid      Musculoskeletal   (+) Bulging lumbar disc   (+) DDD (degenerative disc disease), lumbar   (+) DJD (degenerative joint disease)   (+) Fibromyalgia syndrome   (+) Neck pain, chronic   (+) Right carpal tunnel syndrome      ID   (+) Methicillin resistant Staphylococcus aureus infection       Clinical information reviewed:   Tobacco  Allergies  Meds   Med Hx  Surg Hx  OB Status  Fam Hx  Soc   Hx        NPO Detail:  NPO/Void Status  Carbohydrate Drink Given Prior to Surgery? : N  Date of Last Liquid: 07/31/24  Time of Last Liquid: 2330  Date of Last Solid: 07/31/24  Time of Last Solid: 2330  Last Intake Type: Clear fluids  Time of Last Void: 1100         Physical Exam    Airway  Mallampati: I  TM distance: >3 FB  Neck ROM: full     Cardiovascular    Dental - normal exam     Pulmonary    Abdominal            Anesthesia Plan    History of general anesthesia?: yes  History of complications of general anesthesia?: no    ASA 3     general     intravenous induction   Anesthetic plan and risks discussed with patient.    Plan discussed with CRNA.

## 2024-08-01 NOTE — NURSING NOTE
Pt given additional dose of oxy for pain reassessment 7/10, gave her incentive spirometer with adequate return demonstration

## 2024-08-01 NOTE — ANESTHESIA PROCEDURE NOTES
Airway  Date/Time: 8/1/2024 1:44 PM  Urgency: elective      Staffing  Performed: attending   Authorized by: Dashawn Waters MD    Performed by: HELIO Sabillon-ALLY  Patient location during procedure: OR    Indications and Patient Condition  Indications for airway management: anesthesia  Spontaneous ventilation: present  Sedation level: deep  Preoxygenated: yes  Patient position: sniffing  MILS maintained throughout  Mask difficulty assessment: 1 - vent by mask    Final Airway Details  Final airway type: endotracheal airway      Successful airway: ETT  Cuffed: yes   Successful intubation technique: video laryngoscopy  Facilitating devices/methods: intubating stylet  Blade size: #3  ETT size (mm): 7.5  Cormack-Lehane Classification: grade I - full view of glottis  Placement verified by: chest auscultation   Measured from: lips  ETT to lips (cm): 20  Number of attempts at approach: 1    Additional Comments  Intubation by paramedic student

## 2024-08-06 LAB
LABORATORY COMMENT REPORT: NORMAL
PATH REPORT.FINAL DX SPEC: NORMAL
PATH REPORT.GROSS SPEC: NORMAL
PATH REPORT.RELEVANT HX SPEC: NORMAL
PATH REPORT.TOTAL CANCER: NORMAL

## 2024-08-12 ENCOUNTER — HOSPITAL ENCOUNTER (OUTPATIENT)
Dept: RADIOLOGY | Facility: HOSPITAL | Age: 39
Discharge: HOME | End: 2024-08-12
Payer: COMMERCIAL

## 2024-08-12 ENCOUNTER — APPOINTMENT (OUTPATIENT)
Dept: CARDIOLOGY | Facility: CLINIC | Age: 39
End: 2024-08-12
Payer: COMMERCIAL

## 2024-08-12 VITALS
SYSTOLIC BLOOD PRESSURE: 116 MMHG | HEART RATE: 89 BPM | HEIGHT: 60 IN | DIASTOLIC BLOOD PRESSURE: 76 MMHG | WEIGHT: 189 LBS | TEMPERATURE: 96.5 F | BODY MASS INDEX: 37.11 KG/M2

## 2024-08-12 DIAGNOSIS — G54.0 BRACHIAL PLEXUS PALSY: ICD-10-CM

## 2024-08-12 DIAGNOSIS — R29.898 WEAKNESS OF BOTH ARMS: ICD-10-CM

## 2024-08-12 DIAGNOSIS — E23.6 EMPTY SELLA SYNDROME (MULTI): Primary | ICD-10-CM

## 2024-08-12 DIAGNOSIS — R55 PRE-SYNCOPE: ICD-10-CM

## 2024-08-12 DIAGNOSIS — R07.89 ATYPICAL CHEST PAIN: ICD-10-CM

## 2024-08-12 PROCEDURE — 70450 CT HEAD/BRAIN W/O DYE: CPT | Performed by: RADIOLOGY

## 2024-08-12 PROCEDURE — 70450 CT HEAD/BRAIN W/O DYE: CPT

## 2024-08-12 PROCEDURE — 99203 OFFICE O/P NEW LOW 30 MIN: CPT | Performed by: INTERNAL MEDICINE

## 2024-08-12 PROCEDURE — 3008F BODY MASS INDEX DOCD: CPT | Performed by: INTERNAL MEDICINE

## 2024-08-12 PROCEDURE — 1036F TOBACCO NON-USER: CPT | Performed by: INTERNAL MEDICINE

## 2024-08-12 ASSESSMENT — PAIN SCALES - GENERAL: PAINLEVEL: 6

## 2024-08-12 NOTE — PROGRESS NOTES
"1.  Atypical chest pain  2.  Hypothyroid  3.  Overweight    Patient is a pleasant 39-year-old female with the above-noted pertinent past medical history who presents today for evaluation of chest discomfort.  She states that she has been experiencing intermittent \"burning chest discomfort located retrosternally now for greater than 6 years she states that these episodes are usually better with holding her chest lasting couple of minutes, she has no associated symptoms, she states that she is very active including climbing up 2 flights of stairs without difficulty, she has no complaints of orthopnea PND palpitation or syncopal episode    Past surgical history  Cholecystectomy 8/2024    Family medical history Father 62 years of age \"drug addict \"  Mother 59 years of age, hypertension smoking and COPD    Social history   lives at home mother of 2 children, she is a former smoker who quit in 2016 after smoking a half a pack per day for 10 years, she consumes occasional alcoholic beverages had 2 years of college education    No known drug allergies    Medication list is reviewed noted in the chart    Review of system  Last eye examination 2023 no cataracts or glaucoma she wears glasses, consumes 1 cup of coffee daily, 1 restaurant meals per week, she has had 50 pounds of weight loss in the past 5 months with dieting, all other systems are reviewed and negative for complaints    Physical examination vitals are stable BMI 36.9 previously 45.6, blood pressure 116/76 mmHg and heart rate of 89 bpm patient is a well-developed well-nourished female in no acute distress speaking full sentences, no carotid bruits upstroke volumes are normal, heart rate and rhythm are regular S1-S2 are normal no gallop or murmurs are appreciated lungs clear to auscultation abdomen is obese positive bowel sounds soft and nontender    Twelve-lead EKG 7/18/2024  Normal sinus rhythm normal ECG  Hemoglobin of 13.9 hematocrit of 43.1  Sodium 139, " potassium 4.3, BUN 8, creatinine 0.7  11/20/2023, total cholesterol 137, triglyceride 86, HDL 48, and LDL of 71    39-year-old female with former smoking, presentation of atypical chest discomfort past 6 months, her twelve-lead EKG normal sinus rhythm normal record as noted above recommend obtaining a graded exercise test for further ischemic evaluation and determination of the functional capacity, continued risk factor modification was discussed with her patient was encouraged to remain smoke-free, patient is to return to my clinic after the above test completed.

## 2024-08-14 PROBLEM — Z87.39 H/O DEGENERATIVE DISC DISEASE: Status: RESOLVED | Noted: 2023-03-07 | Resolved: 2024-08-14

## 2024-08-14 PROBLEM — K76.0 FATTY LIVER: Status: ACTIVE | Noted: 2024-08-14

## 2024-08-14 PROBLEM — M54.9 DORSALGIA: Status: RESOLVED | Noted: 2023-10-12 | Resolved: 2024-08-14

## 2024-08-14 PROBLEM — F41.9 ANXIETY: Status: RESOLVED | Noted: 2023-03-07 | Resolved: 2024-08-14

## 2024-08-14 PROBLEM — E66.01 MORBID OBESITY WITH BODY MASS INDEX (BMI) OF 40.0 OR HIGHER (MULTI): Status: RESOLVED | Noted: 2023-03-07 | Resolved: 2024-08-14

## 2024-08-14 PROBLEM — M79.18 MYALGIA, OTHER SITE: Status: RESOLVED | Noted: 2023-10-12 | Resolved: 2024-08-14

## 2024-08-14 PROBLEM — M54.2 CERVICAL PAIN: Status: RESOLVED | Noted: 2023-03-07 | Resolved: 2024-08-14

## 2024-08-14 PROBLEM — M25.50 ARTHRALGIA: Status: RESOLVED | Noted: 2023-03-07 | Resolved: 2024-08-14

## 2024-08-14 PROBLEM — G43.109 MIGRAINE WITH AURA: Status: RESOLVED | Noted: 2023-09-04 | Resolved: 2024-08-14

## 2024-08-14 ASSESSMENT — ENCOUNTER SYMPTOMS
SORE THROAT: 0
CHILLS: 0
BRUISES/BLEEDS EASILY: 0
DYSURIA: 0
CONFUSION: 0
WOUND: 0
SHORTNESS OF BREATH: 0
BLOOD IN STOOL: 0
BACK PAIN: 0
EYE REDNESS: 0
FEVER: 0
TROUBLE SWALLOWING: 0
UNEXPECTED WEIGHT CHANGE: 0
APPETITE CHANGE: 0
VOMITING: 0
EYE DISCHARGE: 0
HEADACHES: 0
EYE PAIN: 0
ADENOPATHY: 0
NECK PAIN: 0
DIZZINESS: 0
FREQUENCY: 0
FATIGUE: 0
HEMATURIA: 0
ABDOMINAL PAIN: 0
POLYDIPSIA: 0
PALPITATIONS: 0
HALLUCINATIONS: 0
COUGH: 0
POLYPHAGIA: 0

## 2024-08-14 ASSESSMENT — PROMIS GLOBAL HEALTH SCALE
RATE_AVERAGE_FATIGUE: VERY SEVERE
RATE_SOCIAL_SATISFACTION: FAIR
RATE_PHYSICAL_HEALTH: FAIR
RATE_AVERAGE_PAIN: 7
RATE_GENERAL_HEALTH: FAIR
RATE_QUALITY_OF_LIFE: FAIR
EMOTIONAL_PROBLEMS: SOMETIMES
RATE_MENTAL_HEALTH: FAIR
CARRYOUT_PHYSICAL_ACTIVITIES: MOSTLY
CARRYOUT_SOCIAL_ACTIVITIES: VERY GOOD

## 2024-08-14 NOTE — PROGRESS NOTES
Subjective   Patient ID: Jean-Claude Ronquillo is a 39 y.o. female who presents for Annual Exam.    Is pt fasting? Yes   Does pt see any providers other than care team below:   vikas Dejesus, skyler ng, nikki, hui, leona, george, conrado sanchez    Did pt call cpap company about a diff mask?yes   *Last pap-  thinks not long ago, but not sure when next one is due  When is next one due? Thinks next year   Is pt taking vit d twice a wk now? Yes   Last migraine last week   Any questions or concerns today?  Wants to go over ct scan.    Poc A1c=5.0      Sees therapist and psychiatrist    See ent for other sleep apnea options  Not like mask with cpap      No care team member to display    HPI  Last labs-7/2024 CBC (complete blood count) was normal which looks at infection and anemia markers.  Sugar (aka glucose), kidney function, liver function and electrolytes in the CMP (comprehensive metabolic panel) were normal.  Vitamin d higher than 8mon ago but not yet normal. Vit d 22. Goal >30.  Are you taking the vitamin d rx 1 a wk? Send me a message to let me know. Incr to twice a wk  11/2023 Vitamin D is low at 12 . Goal is >30. restart Vitamin D prescription 1 a week. Prescription sent. Recheck lab in 2 months. No fasting needed.  This will be a lifetime medication.  Sugar (aka glucose), kidney function, liver function and electrolytes in the CMP (comprehensive metabolic panel) were normal.  Ldl and trigs normal  Hdl slightly low at 48. Goal >50 for women. Incr exercise.  TSH (thyroid test) was normal.  CBC (complete blood count) was normal which looks at infection and anemia markers.  A1c 5.7  5/2023 a1c 5.1  10/2022 tsh nl, cmp nl, lipid nl; 4/2022 cbc nl   Due for labs- vit d in 2mon-print, print pituitary labs; tsh, lipid, fbs    Cholesterol   Date Value Ref Range Status   11/20/2023 137 0 - 199 mg/dL Final     Comment:           Age      Desirable   Borderline High   High     0-19 Y     0 - 169       170 -  199     >/= 200    20-24 Y     0 - 189       190 - 224     >/= 225         >24 Y     0 - 199       200 - 239     >/= 240   **All ranges are based on fasting samples. Specific   therapeutic targets will vary based on patient-specific   cardiac risk.    Pediatric guidelines reference:Pediatrics 2011, 128(S5).Adult guidelines reference: NCEP ATPIII Guidelines,ADA 2001, 258:2486-97    Venipuncture immediately after or during the administration of Metamizole may lead to falsely low results. Testing should be performed immediately prior to Metamizole dosing.   10/26/2022 133 0 - 199 mg/dL Final     Comment:     .      AGE      DESIRABLE   BORDERLINE HIGH   HIGH     0-19 Y     0 - 169       170 - 199     >/= 200    20-24 Y     0 - 189       190 - 224     >/= 225         >24 Y     0 - 199       200 - 239     >/= 240   **All ranges are based on fasting samples. Specific   therapeutic targets will vary based on patient-specific   cardiac risk.  .   Pediatric guidelines reference:Pediatrics 2011, 128(S5).   Adult guidelines reference: NCEP ATPIII Guidelines,     ADA 2001, 258:0356-97  .   Venipuncture immediately after or during the    administration of Metamizole may lead to falsely   low results. Testing should be performed immediately   prior to Metamizole dosing.     04/04/2022 116 0 - 199 mg/dL Final     Comment:     .      AGE      DESIRABLE   BORDERLINE HIGH   HIGH     0-19 Y     0 - 169       170 - 199     >/= 200    20-24 Y     0 - 189       190 - 224     >/= 225         >24 Y     0 - 199       200 - 239     >/= 240   **All ranges are based on fasting samples. Specific   therapeutic targets will vary based on patient-specific   cardiac risk.  .   Pediatric guidelines reference:Pediatrics 2011, 128(S5).   Adult guidelines reference: NCEP ATPIII Guidelines,     ADA 2001, 258:2486-97  .   Venipuncture immediately after or during the    administration of Metamizole may lead to falsely   low results. Testing should be  performed immediately   prior to Metamizole dosing.       Triglycerides   Date Value Ref Range Status   11/20/2023 86 0 - 149 mg/dL Final     Comment:        Age         Desirable   Borderline High   High     Very High   0 D-90 D    19 - 174         ----         ----        ----  91 D- 9 Y     0 -  74        75 -  99     >/= 100      ----    10-19 Y     0 -  89        90 - 129     >/= 130      ----    20-24 Y     0 - 114       115 - 149     >/= 150      ----         >24 Y     0 - 149       150 - 199    200- 499    >/= 500    Venipuncture immediately after or during the administration of Metamizole may lead to falsely low results. Testing should be performed immediately prior to Metamizole dosing.   10/26/2022 83 0 - 149 mg/dL Final     Comment:     .      AGE      DESIRABLE   BORDERLINE HIGH   HIGH     VERY HIGH   0 D-90 D    19 - 174         ----         ----        ----  91 D- 9 Y     0 -  74        75 -  99     >/= 100      ----    10-19 Y     0 -  89        90 - 129     >/= 130      ----    20-24 Y     0 - 114       115 - 149     >/= 150      ----         >24 Y     0 - 149       150 - 199    200- 499    >/= 500  .   Venipuncture immediately after or during the    administration of Metamizole may lead to falsely   low results. Testing should be performed immediately   prior to Metamizole dosing.     04/04/2022 126 0 - 149 mg/dL Final     Comment:     .      AGE      DESIRABLE   BORDERLINE HIGH   HIGH     VERY HIGH   0 D-90 D    19 - 174         ----         ----        ----  91 D- 9 Y     0 -  74        75 -  99     >/= 100      ----    10-19 Y     0 -  89        90 - 129     >/= 130      ----    20-24 Y     0 - 114       115 - 149     >/= 150      ----         >24 Y     0 - 149       150 - 199    200- 499    >/= 500  .   Venipuncture immediately after or during the    administration of Metamizole may lead to falsely   low results. Testing should be performed immediately   prior to Metamizole dosing.    "    HDL-Cholesterol   Date Value Ref Range Status   11/20/2023 48.5 mg/dL Final     Comment:       Age       Very Low   Low     Normal    High    0-19 Y    < 35      < 40     40-45     ----  20-24 Y    ----     < 40      >45      ----        >24 Y      ----     < 40     40-60      >60       HDL   Date Value Ref Range Status   10/26/2022 51.5 mg/dL Final     Comment:     .      AGE      VERY LOW   LOW     NORMAL    HIGH       0-19 Y       < 35   < 40     40-45     ----    20-24 Y       ----   < 40       >45     ----      >24 Y       ----   < 40     40-60      >60  .     04/04/2022 43.9 mg/dL Final     Comment:     .      AGE      VERY LOW   LOW     NORMAL    HIGH       0-19 Y       < 35   < 40     40-45     ----    20-24 Y       ----   < 40       >45     ----      >24 Y       ----   < 40     40-60      >60  .       No results found for: \"LDL\"  Thyroid Stimulating Hormone   Date Value Ref Range Status   11/20/2023 1.97 0.44 - 3.98 mIU/L Final     No results found for: \"A1C\"  No components found for: \"POCA1C\"  No results found for: \"ALBUR\"  No components found for: \"POCALBUR\"      Other concerns: seeing ent dr 9/20/24, seeing neuro dr 8/28/24    Wants to go over ct scan.    bps at home- none    ER/urgicare visits in the last year- gb  Hospitalizations in the last year- none      last Pap-10/26/22  ascus pos 18; colp?yes; last pap 2023  H/o abn pap-10/2022    FH ovarian, endometrial, cervical, uterine ca-none    Current birth control method-nexplanon  No change in contraception desired      last mammo- (40-75/90) 12/11/23    FH br ca-none      FH colon ca-none      Exercise- stairs, treadmill, yoga  Diet-2 meals-lunch, dinner   Cut out redbull; occas coffee; flavor in water  Body mass index is 37.85 kg/m².    last eye dr appt- glasses; 8monago  No vision issues    last dental appt- within 1yr    BMs-regular  Sleep-able to fall asleep but hard  to stay asleep; has mod to severe sleep apnea with no cpap  no cp, swelling, " sob, abd pain, n/v/d/c, blood in stool or black stools  STI testing including hiv (age 15-65) and hep c screening (18-79)-declines        Immunization History   Administered Date(s) Administered    Flu vaccine (IIV4), preservative free *Check age/dose* 12/29/2017, 10/20/2021, 10/26/2022, 11/20/2023    Influenza Whole 10/19/2010    Pfizer COVID-19 vaccine, Fall 2023, 12 years and older, (30mcg/0.3mL) 01/03/2024    Pfizer Purple Cap SARS-CoV-2 03/30/2021, 04/25/2021, 01/12/2022    Pneumococcal polysaccharide vaccine, 23-valent, age 2 years and older (PNEUMOVAX 23) 11/01/2018    Td vaccine, age 7 years and older (TDVAX) 01/18/2010    Tdap vaccine, age 7 year and older (BOOSTRIX, ADACEL) 06/19/2009, 04/04/2018, 08/12/2018         fractures in lifetime-rt foot, lf ankle  Fh osteoporosis-none    FH heart attack, heart surgery-pat gr uncle  FH stroke-brother, dad    The 10-year ASCVD risk score (Claudia DK, et al., 2019) is: 0.3%    Values used to calculate the score:      Age: 39 years      Sex: Female      Is Non- : No      Diabetic: No      Tobacco smoker: No      Systolic Blood Pressure: 113 mmHg      Is BP treated: No      HDL Cholesterol: 48.5 mg/dL      Total Cholesterol: 137 mg/dL  Coronary Artery Calcium score:  This test is recommended for men 45 or older and women 55 or older without a history of heart disease and have 1 risk factor (high LDL cholesterol, low HDL cholesterol, high blood pressure, smoker (current or past), type 2 diabetes, IBD, lupus, RA, ankylosing spondylitis, psoriasis or family history of  heart disease <55yrs in dad, brother or child or <65yrs in mom, sister, or child.)       Review of Systems   Constitutional:  Negative for appetite change, chills, fatigue, fever and unexpected weight change.   HENT:  Negative for congestion, ear pain, sore throat and trouble swallowing.    Eyes:  Negative for pain, discharge and redness.   Respiratory:  Negative for cough and  shortness of breath.    Cardiovascular:  Negative for chest pain and palpitations.   Gastrointestinal:  Negative for abdominal pain, blood in stool and vomiting.   Endocrine: Negative for polydipsia, polyphagia and polyuria.   Genitourinary:  Negative for dysuria, frequency, hematuria and urgency.   Musculoskeletal:  Negative for back pain and neck pain.   Skin:  Negative for rash and wound.   Allergic/Immunologic: Negative for immunocompromised state.   Neurological:  Negative for dizziness, syncope and headaches.   Hematological:  Negative for adenopathy. Does not bruise/bleed easily.   Psychiatric/Behavioral:  Negative for confusion and hallucinations.        Objective   Visit Vitals  /78   Pulse 80   Temp 36.4 °C (97.5 °F)      BP Readings from Last 3 Encounters:   08/15/24 113/78   08/12/24 116/76   08/01/24 103/68     Wt Readings from Last 3 Encounters:   08/15/24 87.9 kg (193 lb 12.8 oz)   08/12/24 85.7 kg (189 lb)   07/30/24 87.8 kg (193 lb 9.6 oz)           Physical Exam  Constitutional:       General: She is not in acute distress.     Appearance: Normal appearance. She is not ill-appearing.   HENT:      Head: Normocephalic.      Right Ear: Tympanic membrane, ear canal and external ear normal.      Left Ear: Tympanic membrane, ear canal and external ear normal.      Nose: Nose normal.      Mouth/Throat:      Mouth: Mucous membranes are moist.      Pharynx: Oropharynx is clear.   Eyes:      Extraocular Movements: Extraocular movements intact.      Conjunctiva/sclera: Conjunctivae normal.      Pupils: Pupils are equal, round, and reactive to light.   Cardiovascular:      Rate and Rhythm: Normal rate and regular rhythm.      Heart sounds: Normal heart sounds. No murmur heard.  Pulmonary:      Effort: Pulmonary effort is normal. No respiratory distress.      Breath sounds: Normal breath sounds. No wheezing, rhonchi or rales.   Abdominal:      General: Bowel sounds are normal.      Palpations: Abdomen is  soft. There is no mass.      Tenderness: There is no abdominal tenderness.   Musculoskeletal:         General: No swelling or tenderness. Normal range of motion.      Cervical back: Normal range of motion and neck supple.      Right lower leg: No edema.      Left lower leg: No edema.   Skin:     General: Skin is warm.      Findings: No rash.   Neurological:      General: No focal deficit present.      Mental Status: She is alert and oriented to person, place, and time.      Cranial Nerves: No cranial nerve deficit.      Motor: No weakness.   Psychiatric:         Mood and Affect: Mood normal.         Behavior: Behavior normal.         Assessment/Plan   Diagnoses and all orders for this visit:  Routine general medical examination at a health care facility  -     Glucose, fasting; Future  -     TSH with reflex to Free T4 if abnormal; Future  -     Lipid Panel; Future  Prediabetes  -     POCT glycosylated hemoglobin (Hb A1C) manually resulted  Acquired hypothyroidism  BMI 37.0-37.9, adult  Other orders  -     Follow Up In Primary Care - Established; Future

## 2024-08-15 ENCOUNTER — APPOINTMENT (OUTPATIENT)
Dept: PRIMARY CARE | Facility: CLINIC | Age: 39
End: 2024-08-15
Payer: COMMERCIAL

## 2024-08-15 VITALS
SYSTOLIC BLOOD PRESSURE: 113 MMHG | WEIGHT: 193.8 LBS | HEART RATE: 80 BPM | DIASTOLIC BLOOD PRESSURE: 78 MMHG | OXYGEN SATURATION: 96 % | TEMPERATURE: 97.5 F | BODY MASS INDEX: 38.05 KG/M2 | HEIGHT: 60 IN

## 2024-08-15 DIAGNOSIS — E03.9 ACQUIRED HYPOTHYROIDISM: ICD-10-CM

## 2024-08-15 DIAGNOSIS — Z00.00 ROUTINE GENERAL MEDICAL EXAMINATION AT A HEALTH CARE FACILITY: Primary | ICD-10-CM

## 2024-08-15 DIAGNOSIS — R73.03 PREDIABETES: ICD-10-CM

## 2024-08-15 LAB
NON-UH HIE CALCULATED LDL CHOLESTEROL: 72 MG/DL (ref 60–130)
NON-UH HIE CHOLESTEROL: 136 MG/DL (ref 100–200)
NON-UH HIE CORTISOL - RANDOM: 9.74 UG/DL (ref 3.44–22.45)
NON-UH HIE GLUCOSE: 78 MG/DL (ref 74–106)
NON-UH HIE HDL CHOLESTEROL: 52 MG/DL (ref 40–60)
NON-UH HIE PROLACTIN: 6.6 NG/ML
NON-UH HIE TOTAL CHOL/HDL CHOL RATIO: 2.6
NON-UH HIE TRIGLYCERIDES: 60 MG/DL (ref 30–150)
NON-UH HIE TSH: 1.75 UIU/ML (ref 0.55–4.78)
POC HEMOGLOBIN A1C: 5 % (ref 4.2–6.5)

## 2024-08-15 PROCEDURE — 1036F TOBACCO NON-USER: CPT | Performed by: NURSE PRACTITIONER

## 2024-08-15 PROCEDURE — 99396 PREV VISIT EST AGE 40-64: CPT | Performed by: NURSE PRACTITIONER

## 2024-08-15 PROCEDURE — 3008F BODY MASS INDEX DOCD: CPT | Performed by: NURSE PRACTITIONER

## 2024-08-15 PROCEDURE — 83036 HEMOGLOBIN GLYCOSYLATED A1C: CPT | Performed by: NURSE PRACTITIONER

## 2024-08-15 ASSESSMENT — PATIENT HEALTH QUESTIONNAIRE - PHQ9
1. LITTLE INTEREST OR PLEASURE IN DOING THINGS: NOT AT ALL
2. FEELING DOWN, DEPRESSED OR HOPELESS: NOT AT ALL
SUM OF ALL RESPONSES TO PHQ9 QUESTIONS 1 AND 2: 0

## 2024-08-15 NOTE — PATIENT INSTRUCTIONS
A1C today=5.0  This is the 3 month average of your sugars.  You are in the normal range which is 5.6 or less.    Meds for weight loss:  Wegovy, saxenda, zepbound, qsymia, contrave-check pharmacy insurance for coverage.  These meds are not well covered even with a prior authorization.  Ozempic and trulicity and mounjaro are covered by your insurance with a diagnosis attached of diabetes. Without that diagnosis, it will not be covered.     If nothing is covered, you can consider:  Qsymia-$99 thru [facility]  OR  Contrave-$98 thru [facility]  These are pharmacies the  has contracted with to give a discounted price.        Handouts given to pt:  physical handout        Labs:    You can use the lab in our building when fasting. The hrs are: Monday-Friday, 7 a.m. - 5 p.m., Saturday 8 a.m. - 12 noon.   No appt needed, BUT YOU DO NEED THE PAPER ORDER.    Fasting is no food, drink, gum or mints other than water for 12 hrs.   Results will be back in 2-3 business days for most labs. It is always recommended for any orders (labs, xrays, ultrasounds,MRI, ct scan, procedures etc) to check with your insurance provider for expected costs or expenses to you.         You will get your results via phone from my medical assistant if you do not have MyChart.  OR  You will get your results via [employer]    If a result is urgent, I will call to speak to you.    Vaccines:  Up to date    General recommendations:  Exercise-cardio 4-5d/wk 30min each day  Diet-Breakfast-toast (my favorite Micaela Carbone Delighful Multigrain or [employer] Killer Bread Good Seed thin-sliced)/bagel/English muffin-whole wheat flour as a 1st ingredient or cereal/oatmeal/granola bar-fiber 4g or more or protein like eggs or peanut butter; optional veggies  Lunch-protein, 1/2c carb or 2 slices bread, veg 1c  Dinner-protein, fist sized carb, veg 1c  Fruit 2 a day  Dairy 2 a day-milk, soy milk, almond milk, cheese, yogurt, cottage  cheese  Snacks-Protein-hard boiled egg, nuts (walnuts/almonds/pecans/pistachios 1/4c), hummus, beef/deer jerky or meat sticks; vegetable, fruit, dairy-milk(1%, skim, almond, soy)/cheese (not a lot of cheddar)/yogurt (Greek is best-my favorite Dannon Fruit on the Bottom Greek)/cottage cheese 2%; triscuits/ popcorn/wheat thins have a lot of fiber; follow serving size on bag/box/container  increase water  Limit alcohol to 1 drink per day for women and 2 drinks per day for men (1 drink=12oz beer or 5oz wine or 1 1/2oz liquor)  Calcium: 500mg 1 twice a day if age 50 and younger and 600mg 1 twice a day if over age 50 (calcium citrate can be taken without food)  Vitamin D: 800-5000 IU/day  Limit salt to <2300mg a day if age 50 and under and <1500mg a day if over age 50/have high bp or diabetes or kidney disease  Recommend folate for childbearing age women 0.4mg per day (can be found in a multivitamin)  Recommend 18mg/dL of iron a day if age 50 and under and 8mg/dL a day if over age 50; take on an empty stomach at bedtime  Use sunscreen   Wear seatbelt  Recommend safe sex practices: using condoms everytime you have sex, discuss with a new partner about their past partners/history of STDs/drug use, avoid drinking alcohol or using drugs as this increases the chance that you will participate in high-risk sex, for oral sex help protect your mouth by having your partner use a condom (male or female), women should not douche after sex, be aware of your partner's body and your body-look for signs of a sore, blister, rash, or discharge, and have regular exams and periodic tests for STDs.  No distracted driving  No driving when under influence of substances  Wear a seatbelt  Eye dr every 1-2yrs  Dentist every 6-12 mon  Tetanus shot every 10yrs  Recommend flu vaccine in the fall  Appt in 6mon for follow up on diet exercise and 1 year for physical      I will communicate with you via [employer] regarding messages and results. If you need  help with this, you can call the support line at [phone].    IT WAS A PLEASURE TO SEE YOU TODAY. THANK YOU FOR CHOOSING US FOR YOUR HEALTHCARE NEEDS.

## 2024-08-17 DIAGNOSIS — R79.89 LOW SERUM ADRENOCORTICOTROPHIC HORMONE (ACTH): Primary | ICD-10-CM

## 2024-08-17 DIAGNOSIS — E23.6 EMPTY SELLA SYNDROME (MULTI): ICD-10-CM

## 2024-08-17 LAB — NON-UH HIE ACTH: 5.5 PG/ML (ref 7.2–63.3)

## 2024-08-20 ENCOUNTER — TELEPHONE (OUTPATIENT)
Dept: SURGERY | Facility: CLINIC | Age: 39
End: 2024-08-20
Payer: COMMERCIAL

## 2024-08-21 ENCOUNTER — APPOINTMENT (OUTPATIENT)
Dept: SURGERY | Facility: CLINIC | Age: 39
End: 2024-08-21
Payer: COMMERCIAL

## 2024-08-21 DIAGNOSIS — K80.20 GALLSTONES: Primary | ICD-10-CM

## 2024-08-21 PROCEDURE — 99024 POSTOP FOLLOW-UP VISIT: CPT | Performed by: SURGERY

## 2024-08-21 ASSESSMENT — ENCOUNTER SYMPTOMS
CHILLS: 0
DIAPHORESIS: 0
APPETITE CHANGE: 0
FEVER: 0
DIZZINESS: 0
CONFUSION: 0
SHORTNESS OF BREATH: 0
ACTIVITY CHANGE: 0
AGITATION: 0

## 2024-08-21 NOTE — PROGRESS NOTES
I performed this visit using real-time telehealth tools, including an audio/video OR telephone connection between Jean-Claude Ronquillo  and Will Moe MD    An interactive audio and video telecommunication system which permits real time communications between the patient (at the originating site) and provider (at the distant site) was utilized to provide this telehealth service.   Verbal consent was requested and obtained from the patient on this day 08/21/24  for a telehealth visit.   I have verbally confirmed with Jean-Claude Ronquillo that they are physically located in the [facility] during this virtual visit.  The communication and preparation for the call included a total of 15 minutes.      Subjective   Patient ID: Jean-Claude Ronquillo is a 39 y.o. female who presents for No chief complaint on file..    The patient presents for 2-week postop from laparoscopic cholecystectomy for chronic cholecystitis.  She has been recovering well since the surgery.  No major issues.  She is eating more healthy foods, and avoiding fatty, greasy, and fried foods.  Her incisions have been healing well.    Review of Systems   Constitutional:  Negative for activity change, appetite change, chills, diaphoresis and fever.   Respiratory:  Negative for shortness of breath.    Cardiovascular:  Negative for chest pain.   Neurological:  Negative for dizziness.   Psychiatric/Behavioral:  Negative for agitation and confusion.    All other systems reviewed and are negative.      Objective   Gen: Alert and Oriented, no distress  Pulm: Breathing Comfortably on room air   Abd:  Incisions well approximated      Assessment/Plan   The patient is a 39-year-old woman who is 2 weeks postop from a laparoscopic cholecystectomy for chronic cholecystitis.  She has been healing well.  I reviewed her pathology with her, which is consistent with chronic cholecystitis.  She also had normal flow of contrast through her bile ducts with no evidence of obstruction.   I explained her to still be careful about not doing any heavy lifting, nothing more than 20 pounds for the first 6 weeks after surgery.  I also advised her to slowly add fatty, greasy, and fried foods back to her diet.  She can follow-up with us as needed.         Will Moe MD 08/21/24 10:42 AM

## 2024-08-28 ENCOUNTER — OFFICE VISIT (OUTPATIENT)
Dept: NEUROLOGY | Facility: HOSPITAL | Age: 39
End: 2024-08-28
Payer: COMMERCIAL

## 2024-08-28 VITALS
TEMPERATURE: 96.6 F | HEART RATE: 88 BPM | RESPIRATION RATE: 18 BRPM | HEIGHT: 60 IN | SYSTOLIC BLOOD PRESSURE: 130 MMHG | BODY MASS INDEX: 37.89 KG/M2 | WEIGHT: 193 LBS | DIASTOLIC BLOOD PRESSURE: 88 MMHG

## 2024-08-28 DIAGNOSIS — R55 PRE-SYNCOPE: ICD-10-CM

## 2024-08-28 DIAGNOSIS — E23.6 EMPTY SELLA (MULTI): Primary | ICD-10-CM

## 2024-08-28 DIAGNOSIS — R29.898 WEAKNESS OF BOTH ARMS: ICD-10-CM

## 2024-08-28 DIAGNOSIS — G83.20 SPASTIC MONOPLEGIA OF UPPER EXTREMITY (MULTI): ICD-10-CM

## 2024-08-28 DIAGNOSIS — G43.009 MIGRAINE WITHOUT AURA AND WITHOUT STATUS MIGRAINOSUS, NOT INTRACTABLE: ICD-10-CM

## 2024-08-28 PROCEDURE — 99204 OFFICE O/P NEW MOD 45 MIN: CPT | Performed by: STUDENT IN AN ORGANIZED HEALTH CARE EDUCATION/TRAINING PROGRAM

## 2024-08-28 PROCEDURE — 3008F BODY MASS INDEX DOCD: CPT | Performed by: STUDENT IN AN ORGANIZED HEALTH CARE EDUCATION/TRAINING PROGRAM

## 2024-08-28 PROCEDURE — 99214 OFFICE O/P EST MOD 30 MIN: CPT | Mod: GC | Performed by: STUDENT IN AN ORGANIZED HEALTH CARE EDUCATION/TRAINING PROGRAM

## 2024-08-28 RX ORDER — SUMATRIPTAN 50 MG/1
50 TABLET, FILM COATED ORAL ONCE AS NEEDED
Qty: 27 TABLET | Refills: 3 | Status: SHIPPED | OUTPATIENT
Start: 2024-08-28 | End: 2025-08-28

## 2024-08-28 ASSESSMENT — PAIN SCALES - GENERAL: PAINLEVEL: 5

## 2024-08-28 NOTE — PATIENT INSTRUCTIONS
Dear Ms. Ronquillo,    It was a pleasure seeing you in clinic today. You were seen today regarding the episode you had in July. For this episode we would like to obtain further lab studies. For the CT scan findings we will order an MRI of your brain to further assess your pituitary gland. In the meantime, we will start you on Sumatriptan for your migraines.     Sincerely,    Your neurology team

## 2024-08-28 NOTE — PROGRESS NOTES
"Subjective     Tablittle Ronquillo is a 39 y.o. year old female with PMHx of hypothyroidism and COCO who presents to Neurology clinic for presyncope and bilateral upper extremity weakness. Patient referred by PCP Maria Luz Gorman.     HPI  In July, after dinner went to sit down, started to feel dizzy/lightheaded, cold, nauseous and clammy. This occurred shortly after sitting. Did not start to strain or urinate. Asked for a cup of water and when the cup was brought to her she was unable to move her upper extremities. Describes her arms freezing in place flexed at the elbows. Fingers were flexed and curled together. Episode lasted about 5 minutes but the abnormal posture episode lasted only 2 minutes. Continued to feel numb in upper extremities for shortly after episode. None of these symptoms have recurred since this one episode. Does report a prior episode similar to this 18 years ago while driving in car. Denies any lightheadedness or dizziness when standing. Denies any further dysautonomic complaints including dry eyes, diarrhea, and constipation.    Of note, patient also endorsing mild headaches daily with \"bad\" headaches once per month. Her usual headaches are bifrontal. Her bad headaches are holocephalic but primarily bitemporal and frontal. Describes a throbbing/stabbing/squeezing/burning pain. Denies any aura but endorses some blurry vision when headache gets more intense. Otherwise denies any vision changes. Accompanied with photophobia, phonophobia and nausea. Has vomited in the past. Headache is nonpositinal.  Her \"bad\" headaches are relieved by Excedrin but can last up to 2-3 days. States she takes Excedrin about 3-4 days out of a month. When she does have her bad headaches she is not able to function and must lay in a dark room four hours to find relief.     ECG was obtained by PCP and found NSR.     CT Head was also obtained and found CSF attenuation extending into the sellar region suggestive  of a " partially empty sella syndrome. ACTH was checked by PCP and found to be 5.5. Patient subsequently referred to Endocrinology.    Review of Systems    Patient Active Problem List   Diagnosis    ASCUS with positive high risk HPV cervical    Back pain, chronic    Bulging lumbar disc    Concentration deficit    Hypothyroid    Insomnia    Migraine without aura and without status migrainosus, not intractable    Mild episode of recurrent major depressive disorder (CMS-HCC)    Prediabetes    COCO (obstructive sleep apnea)    Vitamin D deficiency    Cervical high risk human papillomavirus (HPV) DNA test positive    DJD (degenerative joint disease)    DAVID (generalized anxiety disorder)    Hiatal hernia with GERD    Nexplanon in place    Class 3 severe obesity in adult (Multi)    Reflex tachycardia    Right carpal tunnel syndrome    Fibromyalgia syndrome    DDD (degenerative disc disease), lumbar    Somatic dysfunction of lumbar region    Somatic dysfunction of cervical region    Neck pain, chronic    Somatic dysfunction of thoracic region    Sacroiliac joint somatic dysfunction    Routine general medical examination at a health care facility    Encounter for screening mammogram for malignant neoplasm of breast    Calculus of gallbladder with biliary obstruction but without cholecystitis    Empty sella syndrome (Multi)    Fatty liver    BMI 37.0-37.9, adult     Past Medical History:   Diagnosis Date    ADHD (attention deficit hyperactivity disorder)     Allergic     Anxiety     Arthritis     Depression     Disease of thyroid gland     GERD (gastroesophageal reflux disease)     Gestational diabetes mellitus in pregnancy, unspecified control ([facility])     Gestational diabetes mellitus (GDM) in third trimester, gestational diabetes method of control unspecified    Hiatal hernia     Hypothyroidism     Low grade squamous intraepithelial lesion on cytologic smear of cervix (LGSIL)     LGSIL of cervix of undetermined significance     Methicillin resistant Staphylococcus aureus infection 2008    Migraines     Neuromuscular disorder (Multi)     Obesity     Other conditions influencing health status     Normal Pap smear    Sleep apnea      Past Surgical History:   Procedure Laterality Date     SECTION, LOW TRANSVERSE  6-22-18    CHOLECYSTECTOMY  2024    OTHER SURGICAL HISTORY  2021    [place] tooth extraction    OTHER SURGICAL HISTORY  2021     section    OTHER SURGICAL HISTORY  2021    Endoscopy    OTHER SURGICAL HISTORY  2021    Carpal tunnel surgery    WISDOM TOOTH EXTRACTION       Social History     Tobacco Use    Smoking status: Former     Current packs/day: 0.00     Average packs/day: 0.5 packs/day for 15.0 years (7.5 ttl pk-yrs)     Types: Cigarettes     Quit date:      Years since quittin.6     Passive exposure: Past    Smokeless tobacco: Never   Substance Use Topics    Alcohol use: Never     family history includes ADD / ADHD in her daughter; Alcohol abuse in her mother; Anxiety disorder in her father's sister, mother, and paternal grandmother; Arthritis in her father, maternal grandfather, mother, and paternal grandmother; Bipolar disorder in her father; COPD in her mother; Cancer in her maternal grandmother; Depression in her daughter, father, and mother; Diabetes in her paternal grandmother; Drug abuse in her father; Hearing loss in her father and paternal grandmother; Hypertension in her mother and paternal grandmother; Lyme disease in her mother; Osteoporosis in her paternal grandmother; Stroke in her brother; degenerative disc disease in her mother.    Current Outpatient Medications:     acetaminophen (Tylenol) 500 mg tablet, Take 2 tablets (1,000 mg) by mouth every 6 hours if needed for mild pain (1 - 3) or moderate pain (4 - 6)., Disp: , Rfl:     ergocalciferol (Vitamin D-2) 1.25 MG (55845 UT) capsule, 1 po twice a wk, Disp: 24 capsule, Rfl: 4    etonogestrel-eluting  contraceptive (Nexplanon) 68 mg implant implant, mg EA, Subcutaneous, ONCE, Date: 11/29/2022 09:45:00 EST, Disp: , Rfl:     ferrous sulfate (IRON ORAL), Take 1 tablet by mouth once daily., Disp: , Rfl:     gabapentin (Neurontin) 600 mg tablet, TAKE 1 TABLET(600 MG) BY MOUTH THREE TIMES DAILY, Disp: 90 tablet, Rfl: 6    ibuprofen 200 mg tablet, Take 2 tablets (400 mg) by mouth every 6 hours if needed for mild pain (1 - 3) or moderate pain (4 - 6)., Disp: , Rfl:     levothyroxine (Synthroid, Levoxyl) 75 mcg tablet, TAKE 1 TABLET BY MOUTH EVERY DAY, Disp: 90 tablet, Rfl: 4    multivitamin tablet, Take 1 tablet by mouth once daily., Disp: , Rfl:     sertraline (Zoloft) 100 mg tablet, Take 1 tablet (100 mg) by mouth once daily. as directed, Disp: 90 tablet, Rfl: 2  Allergies   Allergen Reactions    Cat Dander Unknown    Hydrocodone-Acetaminophen Other     Gi upset    Topiramate Unknown       Objective   Neurological Exam  Physical Exam  GENERAL APPEARANCE:  No distress, alert and cooperative.     MENTAL STATE:  Orientation was normal to time, place and person.  Attention span and concentration were normal.     OPHTHALMOSCOPIC: The ophthalmoscopic exam normal. The fundi were well visualized with normal disc margins. No disc edema.     CRANIAL NERVES:  Cranial nerves were normal.      CN 2- visual fields full to confrontation.      CN 3, 4, 6-  Pupils round, 4 mm in diameter, equally reactive to light. No ptosis. EOMs normal alignment, full range of movement, no nystagmus     CN 5- Facial sensation intact bilaterally.      CN 7- Normal and symmetric facial strength. Nasolabial folds symmetric.     CN 8- Hearing intact to finger rub     CN 9- Palate elevates symmetrically.     CN 11- Normal strength of shoulder shrug and neck turning      CN 12- Tongue midline    MOTOR:  Motor exam was normal. Muscle bulk and tone were normal in both upper and lower extremities. Muscle strength was 5/5 in distal and proximal muscles in both  upper and lower extremities. No  tremor or other abnormal movements were present.     REFLEXES:  Right/ Left:  Biceps 2/2, brachioradialis 2/2, triceps 2/2, patellar 2/2, ankle 2/2  Babinski: toes downgoing to plantar stimulation. No clonus, frontal release signs or other pathologic reflexes present.     SENSORY: Sensory exam was intact to light touch, vibration and position sense in both UE and LE.     COORDINATION:  In UE- finger-nose-finger was intact and in LE- heel-to-shin was intact without dysmetria or overshoot.      GAIT: Station was stable with a normal base and negative Romberg sign. Gait was stable with a normal arm swing and speed.       Results:  The following labs, imaging, and/or data were personally reviewed and demonstrated:    Below is the patient's most recent value for Albumin, ALT, AST, BUN, Calcium, Chloride, Cholesterol, CO2, Creatinine, GFR, Glucose, HDL, Hematocrit, Hemoglobin, Hemoglobin A1C, LDL, Magnesium, Phosphorus, Platelets, Potassium, PSA, Sodium, Triglycerides, and WBC.   Lab Results   Component Value Date    ALBUMIN 4.2 11/20/2023    ALT 14 11/20/2023    AST 14 11/20/2023    BUN 8 07/30/2024    CALCIUM 8.6 07/30/2024     07/30/2024    CHOL 137 11/20/2023    CO2 20 (L) 07/30/2024    CREATININE 0.70 07/30/2024    HDL 48.5 11/20/2023    HCT 43.1 07/30/2024    HGB 13.9 07/30/2024    HGBA1C 5.0 08/15/2024     07/30/2024    K 4.3 07/30/2024     07/30/2024    TRIG 86 11/20/2023    WBC 9.0 07/30/2024     Note: for a comprehensive list of the patient's lab results, access the Results Review activity.               Component  Ref Range & Units 9 mo ago  (11/20/23) 1 yr ago  (10/26/22) 2 yr ago  (4/4/22) 2 yr ago  (11/3/21) 2 yr ago  (9/20/21) 3 yr ago  (8/26/21)   Thyroid Stimulating Hormone  0.44 - 3.98 mIU/L 1.97 1.44 CM 0.77 CM 1.55 CM 4.34 High  CM 9.45 High  CM   Resulting Agency [facility]        Imaging:  CT Head:  IMPRESSION:  There is no  hyperdense acute intracranial hemorrhage or definite  acute cortical infarction.      There is CSF attenuation extending into the sellar region suggestive  of a partially empty sella syndrome      Assessment/Plan   There are no diagnoses linked to this encounter.  Jean-Claude Ronquillo is a 39 y.o. year old female with PMHx of hypothyroidism and COCO who presents to Neurology clinic for presyncope and bilateral upper extremity spasm. Patient referred by PCP Maria Luz Gorman.     Patient describes a presyncopal event with complaints of abnormal posture for several minutes following the event. Although this is an isolated event this could represent carpopedal spasm and be related to an electrolyte abnormality. Thus will order further workup with ionized calcium, PTH, magnesium and repeat RFP.    Her headache complaints are not consistent with a diagnosis of IIH. She does not describe a positional component and denies any visual complaints. Neuro examination was unremarkable, particularly during ophthalmic exam there was no disc edema. Her headache was consistent with a migraine which she struggles with monthly. For this will stat Sumatriptan for abortive therapy.     In regards to the CT scan findings, will order further workup with MRI with sella protocol.  MRI with sella protocol    Plan:  - ionized Calcium, PTH, magnesium, RFP  - MRI with sella protocol  - Start Sumatriptan 50mg prn for migraines  - Follow up with PCP and Endocrine regarding further pituitary work up  - Follow up with Neurology in 6 months    Abiodun Urbina DO       ATTENDING ADDENDUM:     I saw and evaluated the patient and agree with the resident/fellow's findings noted above. I personally obtained the key and critical portions of the history and physical exam.  I reviewed the resident's documentation and discussed the patient with the resident/fellow.  I also personally reviewed all recent EMR notes, laboratories, and/or diagnostic test data.      Jean-Claude Ronquillo is a 39 y.o. female who presents for evaluation of a presyncopal event.  She recalls sitting on the toilet, before being able to micturate she recalls a sudden sense of dizziness or wooziness associated with tightening of her arms and abnormal flexed wrist and extended posture for fingers.  This lasted for some minutes and resolved.  She had another episode like this many years ago.  Overall this description sounds consistent with a carpopedal spasm, however unclear if the feet were involved.  In light of her possible sella abnormality endocrine and metabolic abnormalities are considered as an underlying etiology and will send for ionized calcium and PTH along with an MRI sella protocol to assist with this evaluation before seeing endocrinology.  I did personally review her CT of the head which reveals an empty sella, no other significant abnormalities.    She also complains of migraine headaches which are currently intermittent and untreated will offer a short acting agent for this.  We will see her back in a few months to track her progress with headache control as well.    Quinn Rivera MD  Neurology and Neuromuscular Medicine   [facility]  [facility] [place]        Billing and Coding Determination:    Medical decision making was moderate complexity. The patient was seen and evaluated for 1 chronic illness with exacerbation and/or progression, or side effects of treatment. I personally reviewed multiple external notes and multiple test results from multiple sources in the EHR. I independently interpreted the patient's CT scan.

## 2024-08-30 ENCOUNTER — APPOINTMENT (OUTPATIENT)
Dept: CARDIOLOGY | Facility: CLINIC | Age: 39
End: 2024-08-30
Payer: COMMERCIAL

## 2024-09-06 ENCOUNTER — APPOINTMENT (OUTPATIENT)
Dept: CARDIOLOGY | Facility: CLINIC | Age: 39
End: 2024-09-06
Payer: COMMERCIAL

## 2024-09-11 ENCOUNTER — TELEMEDICINE (OUTPATIENT)
Dept: PRIMARY CARE | Facility: CLINIC | Age: 39
End: 2024-09-11
Payer: COMMERCIAL

## 2024-09-11 DIAGNOSIS — U07.1 COVID-19: Primary | ICD-10-CM

## 2024-09-11 PROCEDURE — 99213 OFFICE O/P EST LOW 20 MIN: CPT | Performed by: FAMILY MEDICINE

## 2024-09-11 RX ORDER — AZITHROMYCIN 250 MG/1
TABLET, FILM COATED ORAL
Qty: 6 TABLET | Refills: 0 | Status: SHIPPED | OUTPATIENT
Start: 2024-09-11 | End: 2024-09-16

## 2024-09-11 RX ORDER — ALBUTEROL SULFATE 90 UG/1
2 INHALANT RESPIRATORY (INHALATION) EVERY 4 HOURS PRN
Qty: 8.5 G | Refills: 0 | Status: SHIPPED | OUTPATIENT
Start: 2024-09-11 | End: 2025-09-11

## 2024-09-11 RX ORDER — METHYLPREDNISOLONE 4 MG/1
TABLET ORAL
Qty: 21 TABLET | Refills: 0 | Status: SHIPPED | OUTPATIENT
Start: 2024-09-11 | End: 2024-09-18

## 2024-09-11 NOTE — PROGRESS NOTES
Subjective   Patient ID: Jean-Claude Ronquillo is a 39 y.o. female who presents for No chief complaint on file..  HPI    Review of Systems  Constitutional: no chills, no fever and no night sweats.   Eyes: no blurred vision and no eyesight problems.   ENT: no hearing loss, no nasal congestion, no nasal discharge, no hoarseness and no sore throat.   Cardiovascular: no chest pain, no intermittent leg claudication, no lower extremity edema, no palpitations and no syncope.   Respiratory: no cough, no shortness of breath during exertion, no shortness of breath at rest and no wheezing.   Gastrointestinal: no abdominal pain, no blood in stools, no constipation, no diarrhea, no melena, no nausea, no rectal pain and no vomiting.   Genitourinary: no dysuria, no change in urinary frequency, no urinary hesitancy, no feelings of urinary urgency and no vaginal discharge.   Musculoskeletal: no arthralgias,  no back pain and no myalgias.   Integumentary: no new skin lesions and no rashes.   Neurological: no difficulty walking, no headache, no limb weakness, no numbness and no tingling.   Psychiatric: no anxiety, no depression, no anhedonia and no substance use disorders.   Endocrine: no recent weight gain and no recent weight loss.   Hematologic/Lymphatic: no tendency for easy bruising and no swollen glands .  Virtual or Telephone Consent    An interactive audio and video telecommunication system which permits real time communications between the patient (at the originating site) and provider (at the distant site) was utilized to provide this telehealth service.   Verbal consent was requested and obtained from Jean-Claude Ronquillo on this date, 09/18/24 for a telehealth visit.   Objective    There were no vitals taken for this visit.   Physical Exam  Patient appears well and is in no acute distress  Nose appears congested  No conversational dyspnea  No accessory muscle use  No obvious retractions  Assessment/Plan   Problem List Items Addressed  This Visit       COVID-19 - Primary    Relevant Medications    albuterol (ProAir HFA) 90 mcg/actuation inhaler    methylPREDNISolone (Medrol Dospak) 4 mg tablets            Ann Sheehan MD

## 2024-09-18 PROBLEM — U07.1 COVID-19: Status: ACTIVE | Noted: 2024-09-18

## 2024-11-15 ENCOUNTER — APPOINTMENT (OUTPATIENT)
Dept: NEUROLOGY | Facility: CLINIC | Age: 39
End: 2024-11-15
Payer: COMMERCIAL

## 2024-12-04 PROBLEM — U07.1 COVID-19: Status: RESOLVED | Noted: 2024-09-18 | Resolved: 2024-12-04

## 2024-12-04 PROBLEM — M19.90 DJD (DEGENERATIVE JOINT DISEASE): Status: RESOLVED | Noted: 2023-09-04 | Resolved: 2024-12-04

## 2024-12-05 ENCOUNTER — APPOINTMENT (OUTPATIENT)
Dept: PRIMARY CARE | Facility: CLINIC | Age: 39
End: 2024-12-05
Payer: COMMERCIAL

## 2024-12-05 ASSESSMENT — ENCOUNTER SYMPTOMS
CONSTITUTIONAL NEGATIVE: 1
WHEEZING: 0
SHORTNESS OF BREATH: 0

## 2024-12-05 NOTE — PROGRESS NOTES
Subjective   Patient ID: Jean-Claude Ronquillo is a 39 y.o. female who presents for No chief complaint on file..  Last physical: 8/15/24; has feb follow up scheduled  Last labs-8/2024 A1c 5.0,  Acth is low. This may mean your pituitary gland or your adrenal gland may not be working correctly. I am going to refer you to an endocrinologist. You can call [phone] to set up an appt with  or [phone] to set up an appt with [facility].  All cholesterol labs normal.  TSH (thyroid test) was normal.  Fasting sugar normal at 78. Goal <100.  Prolactin normal  Cortisol is normal.      Does pt want flu shot?  What perimenopausal sx is she having?  Did she discuss this with dr bean in nov?  Did she have a pap done in nov?  If yes leandro  Any other questions or concerns that pt wants to discuss today?    Has endo appt 12/20/24 for low acth    HPI  Perimenopausal sx-    Patient Care Team     Relationship Specialty Notifications Start End   Kevin Eller MD Consulting Physician Endocrinology Admissions 12/4/24     Address:  [address] [place] [address] Dr MARR[address] [facility], [place] [address]        Review of Systems   Constitutional: Negative.    Respiratory:  Negative for shortness of breath and wheezing.    Cardiovascular:  Negative for chest pain.       Objective   There were no vitals taken for this visit.   BP Readings from Last 3 Encounters:   08/28/24 130/88   08/15/24 113/78   08/12/24 116/76     Wt Readings from Last 3 Encounters:   08/28/24 87.5 kg (193 lb)   08/15/24 87.9 kg (193 lb 12.8 oz)   08/12/24 85.7 kg (189 lb)       Physical Exam  Constitutional:       General: She is not in acute distress.     Appearance: Normal appearance.   Neurological:      Mental Status: She is alert.         Assessment/Plan   {Assess/PlanSmartLinks:15155}      See patient instructions for full plan

## 2024-12-06 ENCOUNTER — APPOINTMENT (OUTPATIENT)
Dept: PRIMARY CARE | Facility: CLINIC | Age: 39
End: 2024-12-06
Payer: COMMERCIAL

## 2024-12-06 ASSESSMENT — ENCOUNTER SYMPTOMS
WHEEZING: 0
CONSTITUTIONAL NEGATIVE: 1
SHORTNESS OF BREATH: 0

## 2024-12-06 NOTE — PROGRESS NOTES
Subjective   Patient ID: Jean-Claude Ronquillo is a 39 y.o. female who presents for Follow-up.  Last physical: 8/15/24  Last labs-8/2024 A1c 5.0,  Acth is low. This may mean your pituitary gland or your adrenal gland may not be working correctly. I am going to refer you to an endocrinologist. You can call [phone] to set up an appt with  or [phone] to set up an appt with [facility].  All cholesterol labs normal.  TSH (thyroid test) was normal.  Fasting sugar normal at 78. Goal <100.  Prolactin normal  Cortisol is normal.      Does pt want flu shot? Had  What perimenopausal sx is she having? Hot flashes, fatigue  Did she discuss this with dr bean in nov? No  Did she have a pap done in nov? No  Taking vit d rx twice a wk  Any other questions or concerns that pt wants to discuss today? Weight gain and lack of energy.    Had endo appt 12/20/24 for low acth but no showed    Ent appt- can't maxwell cpap    HPI  Perimenopausal sx-wt gain, lack of energy, hot flashes, fatigue, decr focus, overwhelmed (taking care of kids and moms)  Quit drinking red bull  Has not been to therapist and psychiatrist recently  No otc items  Exercise-normal house stuff  Diet-graze and dinner  Occas sweets  Juice, water, coffee, reg gatorade    Patient Care Team     Relationship Specialty Notifications Start End   Kevin Eller MD Consulting Physician Endocrinology Admissions 12/4/24     Address:  [address] [place] [address] Dr MARRERO [facility] [address] [address]        Review of Systems   Constitutional: Negative.    Respiratory:  Negative for shortness of breath and wheezing.    Cardiovascular:  Negative for chest pain.       Objective   Visit Vitals  /83   Pulse 100   Temp 37.7 °C (99.8 °F)      BP Readings from Last 3 Encounters:   02/05/25 128/83   08/28/24 130/88   08/15/24 113/78     Wt Readings from Last 3 Encounters:   02/05/25 103 kg (226 lb 6.4 oz)   08/28/24 87.5 kg (193 lb)   08/15/24 87.9 kg (193 lb 12.8 oz)        Physical Exam  Constitutional:       General: She is not in acute distress.     Appearance: Normal appearance.   Neurological:      Mental Status: She is alert.         Assessment/Plan   Diagnoses and all orders for this visit:  Fatigue, unspecified type  -     Vitamin B12; Future  Weight gain  Decreased energy  Hot flashes  Concentration deficit  Other orders  -     Follow Up In Primary Care - Health Maintenance; Future        See patient instructions for full plan

## 2024-12-20 ENCOUNTER — APPOINTMENT (OUTPATIENT)
Dept: ENDOCRINOLOGY | Facility: CLINIC | Age: 39
End: 2024-12-20
Payer: COMMERCIAL

## 2024-12-27 DIAGNOSIS — E03.9 ACQUIRED HYPOTHYROIDISM: ICD-10-CM

## 2024-12-27 RX ORDER — LEVOTHYROXINE SODIUM 75 UG/1
75 TABLET ORAL DAILY
Qty: 90 TABLET | Refills: 2 | Status: SHIPPED | OUTPATIENT
Start: 2024-12-27

## 2025-01-26 ENCOUNTER — TELEPHONE (OUTPATIENT)
Dept: PRIMARY CARE | Facility: CLINIC | Age: 40
End: 2025-01-26
Payer: COMMERCIAL

## 2025-01-26 NOTE — TELEPHONE ENCOUNTER
We received a letter form medical service company that pt is not using her cpap.  Pls call pt to see if there is a reason why she is not using it.

## 2025-02-05 ENCOUNTER — APPOINTMENT (OUTPATIENT)
Dept: PRIMARY CARE | Facility: CLINIC | Age: 40
End: 2025-02-05
Payer: COMMERCIAL

## 2025-02-05 VITALS
SYSTOLIC BLOOD PRESSURE: 128 MMHG | TEMPERATURE: 99.8 F | WEIGHT: 226.4 LBS | BODY MASS INDEX: 44.45 KG/M2 | HEIGHT: 60 IN | HEART RATE: 100 BPM | DIASTOLIC BLOOD PRESSURE: 83 MMHG

## 2025-02-05 DIAGNOSIS — R53.83 DECREASED ENERGY: ICD-10-CM

## 2025-02-05 DIAGNOSIS — R41.840 CONCENTRATION DEFICIT: ICD-10-CM

## 2025-02-05 DIAGNOSIS — R63.5 WEIGHT GAIN: ICD-10-CM

## 2025-02-05 DIAGNOSIS — R23.2 HOT FLASHES: ICD-10-CM

## 2025-02-05 DIAGNOSIS — R53.83 FATIGUE, UNSPECIFIED TYPE: Primary | ICD-10-CM

## 2025-02-05 PROCEDURE — 3008F BODY MASS INDEX DOCD: CPT | Performed by: NURSE PRACTITIONER

## 2025-02-05 PROCEDURE — 1036F TOBACCO NON-USER: CPT | Performed by: NURSE PRACTITIONER

## 2025-02-05 PROCEDURE — 99214 OFFICE O/P EST MOD 30 MIN: CPT | Performed by: NURSE PRACTITIONER

## 2025-02-05 RX ORDER — VENLAFAXINE HYDROCHLORIDE 37.5 MG/1
37.5 CAPSULE, EXTENDED RELEASE ORAL DAILY
COMMUNITY
Start: 2025-01-14

## 2025-02-05 NOTE — PATIENT INSTRUCTIONS
For perimenopausal symptoms:  Black cohosh, magnesium glycinate in evening, [employer] cortisol manager-all otc    Consider [facility]-integrative medicine or dr cate MARQUEZ or LY MARRERO    Discuss symptoms with psychiatry-LY Dejesus    Make pap appt with dr johnson    Set up endo dr appt    Labs -no fasting or appt needed  Continue vit d twice a wk    Water, flavored water, carbonated flavored water, zero gatorade, diet drinks  1200cal a day  2 veggies a day  1 fruit a day  Start exercise 2d a wk    See ent     Return in aug for wellness    I will communicate with you via [employer] regarding messages and results. If you need help with this, you can call the support line at [phone].    IT WAS A PLEASURE TO SEE YOU TODAY. THANK YOU FOR CHOOSING US FOR YOUR HEALTHCARE NEEDS.

## 2025-02-07 ENCOUNTER — TELEPHONE (OUTPATIENT)
Dept: OTHER | Age: 40
End: 2025-02-07
Payer: COMMERCIAL

## 2025-02-07 DIAGNOSIS — R41.840 CONCENTRATION DEFICIT: Primary | ICD-10-CM

## 2025-02-07 LAB
NON-UH HIE ALB: 3.3 G/DL (ref 3.4–5)
NON-UH HIE BUN/CREAT RATIO: 15
NON-UH HIE BUN: 12 MG/DL (ref 9–23)
NON-UH HIE CALCIUM, IONIZED: 1.15 MMOL/L (ref 1.12–1.32)
NON-UH HIE CALCIUM: 9 MG/DL (ref 8.7–10.4)
NON-UH HIE CALCULATED OSMOLALITY: 279 MOSM/KG (ref 275–295)
NON-UH HIE CHLORIDE: 106 MMOL/L (ref 98–107)
NON-UH HIE CO2, VENOUS: 27 MMOL/L (ref 20–31)
NON-UH HIE CORRECTED CALCIUM: 9.6 MG/DL (ref 8.5–10.5)
NON-UH HIE CREATININE: 0.8 MG/DL (ref 0.5–0.8)
NON-UH HIE GFR AA: >60
NON-UH HIE GLOMERULAR FILTRATION RATE: >60 ML/MIN/1.73M?
NON-UH HIE GLUCOSE: 92 MG/DL (ref 74–106)
NON-UH HIE I-PTH: 64 PG/ML (ref 18.4–80.1)
NON-UH HIE K: 4 MMOL/L (ref 3.5–5.1)
NON-UH HIE MAGNESIUM: 2.1 MG/DL (ref 1.6–2.6)
NON-UH HIE NA: 140 MMOL/L (ref 135–145)
NON-UH HIE PHOSPHORUS: 3.1 MG/DL (ref 2–5.1)
NON-UH HIE VIT D 25: 39 NG/ML
NON-UH HIE VITAMIN B12: 607 PG/ML (ref 211–911)

## 2025-02-13 ENCOUNTER — APPOINTMENT (OUTPATIENT)
Dept: ENDOCRINOLOGY | Facility: CLINIC | Age: 40
End: 2025-02-13
Payer: COMMERCIAL

## 2025-02-17 ENCOUNTER — APPOINTMENT (OUTPATIENT)
Dept: PRIMARY CARE | Facility: CLINIC | Age: 40
End: 2025-02-17
Payer: COMMERCIAL

## 2025-02-17 ENCOUNTER — APPOINTMENT (OUTPATIENT)
Dept: NEUROLOGY | Facility: CLINIC | Age: 40
End: 2025-02-17
Payer: COMMERCIAL

## 2025-02-27 ENCOUNTER — OFFICE VISIT (OUTPATIENT)
Dept: PRIMARY CARE | Facility: CLINIC | Age: 40
End: 2025-02-27
Payer: COMMERCIAL

## 2025-02-27 VITALS
TEMPERATURE: 97.5 F | DIASTOLIC BLOOD PRESSURE: 83 MMHG | BODY MASS INDEX: 45.39 KG/M2 | OXYGEN SATURATION: 95 % | SYSTOLIC BLOOD PRESSURE: 128 MMHG | WEIGHT: 231.2 LBS | HEIGHT: 60 IN

## 2025-02-27 DIAGNOSIS — J01.00 ACUTE NON-RECURRENT MAXILLARY SINUSITIS: ICD-10-CM

## 2025-02-27 DIAGNOSIS — J40 BRONCHITIS: ICD-10-CM

## 2025-02-27 DIAGNOSIS — J02.9 PHARYNGITIS, UNSPECIFIED ETIOLOGY: Primary | ICD-10-CM

## 2025-02-27 LAB
POC RAPID INFLUENZA A: NEGATIVE
POC RAPID INFLUENZA B: NEGATIVE
POC RAPID STREP: NEGATIVE
POC SARS-COV-2 AG BINAX: NORMAL

## 2025-02-27 PROCEDURE — 3008F BODY MASS INDEX DOCD: CPT | Performed by: NURSE PRACTITIONER

## 2025-02-27 PROCEDURE — 99213 OFFICE O/P EST LOW 20 MIN: CPT | Performed by: NURSE PRACTITIONER

## 2025-02-27 PROCEDURE — 87804 INFLUENZA ASSAY W/OPTIC: CPT | Performed by: NURSE PRACTITIONER

## 2025-02-27 PROCEDURE — 1036F TOBACCO NON-USER: CPT | Performed by: NURSE PRACTITIONER

## 2025-02-27 PROCEDURE — 87880 STREP A ASSAY W/OPTIC: CPT | Performed by: NURSE PRACTITIONER

## 2025-02-27 PROCEDURE — 87811 SARS-COV-2 COVID19 W/OPTIC: CPT | Performed by: NURSE PRACTITIONER

## 2025-02-27 RX ORDER — DOXYCYCLINE 100 MG/1
100 CAPSULE ORAL 2 TIMES DAILY
Qty: 20 CAPSULE | Refills: 0 | Status: SHIPPED | OUTPATIENT
Start: 2025-02-27 | End: 2025-03-09

## 2025-02-27 ASSESSMENT — PATIENT HEALTH QUESTIONNAIRE - PHQ9
SUM OF ALL RESPONSES TO PHQ9 QUESTIONS 1 AND 2: 0
2. FEELING DOWN, DEPRESSED OR HOPELESS: NOT AT ALL
1. LITTLE INTEREST OR PLEASURE IN DOING THINGS: NOT AT ALL

## 2025-02-27 ASSESSMENT — ENCOUNTER SYMPTOMS
DIARRHEA: 0
EYE REDNESS: 0
COUGH: 1
CHEST TIGHTNESS: 0
ABDOMINAL PAIN: 0
SORE THROAT: 1
FEVER: 0
VOMITING: 0
SINUS PRESSURE: 0
FACIAL SWELLING: 0
CHILLS: 1
EYE DISCHARGE: 0
PALPITATIONS: 0
SHORTNESS OF BREATH: 0
ARTHRALGIAS: 0
FATIGUE: 1
WHEEZING: 0
MYALGIAS: 0
HEADACHES: 1
RHINORRHEA: 0

## 2025-02-27 NOTE — PROGRESS NOTES
Subjective   Patient ID: Jean-Claude Ronquillo is a 39 y.o. female who presents for Cough.  Last physical: 8/15/24; has appt scheduled    current sx- cough, congestion, sore throat, headache, fatigue  when did sx start- sunday  did pt take a covid-19 test? No     Rst, tc  Flu   covid    HPI  ST, cough with phlegm, HA, fatigue, stuffy nose x4d; felt worst the last 2d  Felt warm 2d ago; chills 2nights ago    no  sob, wheezing, tight upper chest, fever,  muscle/jt pain,  new loss of taste or smell, diarrhea, vomiting, nausea, abdominal pain, weakness, red eye, rash, bruising, cyanosis, ear pain, ear pressure, runny nose,  post nasal drip, pain with deep breath, leg or foot swelling, calf pain  loss of appetite-yes  fluids-yes  has urinated at least 3 times in 24hrs  Seasonal allergies-none  Selftxt-dayquil, nyquil    No known exposure to COVID-19  No known exposure to strep  No known exposure to influenza  2 dtr flu a,  cold, grandma flu      Risk factors:  Chronic disease/comorbidities: shelly  not a healthcare worker  Age: not 65yrs of age and older      Patient Care Team     Relationship Specialty Notifications Start End   Kevin Eller MD Consulting Physician Endocrinology Admissions 12/4/24     Address:  [address] [place] [address], [place] [address]        Review of Systems   Constitutional:  Positive for chills and fatigue. Negative for fever.   HENT:  Positive for congestion and sore throat. Negative for ear discharge, ear pain, facial swelling, postnasal drip, rhinorrhea and sinus pressure.    Eyes:  Negative for discharge and redness.   Respiratory:  Positive for cough. Negative for chest tightness, shortness of breath and wheezing.    Cardiovascular:  Negative for chest pain, palpitations and leg swelling.   Gastrointestinal:  Negative for abdominal pain, diarrhea and vomiting.   Musculoskeletal:  Negative for arthralgias and myalgias.   Skin:  Negative for rash.    Neurological:  Positive for headaches.       Objective   Visit Vitals  /83   Temp 36.4 °C (97.5 °F)      BP Readings from Last 3 Encounters:   02/27/25 128/83   02/05/25 128/83   08/28/24 130/88     Wt Readings from Last 3 Encounters:   02/27/25 105 kg (231 lb 3.2 oz)   02/05/25 103 kg (226 lb 6.4 oz)   08/28/24 87.5 kg (193 lb)       Physical Exam  Constitutional:       Appearance: Normal appearance.   HENT:      Head: Normocephalic.      Right Ear: Tympanic membrane, ear canal and external ear normal.      Left Ear: Tympanic membrane, ear canal and external ear normal.      Nose: Nose normal.      Mouth/Throat:      Mouth: Mucous membranes are moist.      Pharynx: No oropharyngeal exudate or posterior oropharyngeal erythema.   Cardiovascular:      Rate and Rhythm: Normal rate and regular rhythm.      Heart sounds: Normal heart sounds.   Pulmonary:      Effort: Pulmonary effort is normal.      Breath sounds: Normal breath sounds. No wheezing, rhonchi or rales.   Lymphadenopathy:      Cervical: No cervical adenopathy.   Neurological:      Mental Status: She is alert.         Assessment/Plan   Diagnoses and all orders for this visit:  Pharyngitis, unspecified etiology  -     POCT rapid strep A manually resulted  -     Group A Streptococcus, Culture; Future  -     POCT Influenza A/B manually resulted  -     POCT BinaxNOW Covid-19 Ag Card manually resulted  Acute non-recurrent maxillary sinusitis  -     doxycycline (Vibramycin) 100 mg capsule; Take 1 capsule (100 mg) by mouth 2 times a day for 10 days. Take with at least 8 ounces (large glass) of water, do not lie down for 30 minutes after  Bronchitis  -     doxycycline (Vibramycin) 100 mg capsule; Take 1 capsule (100 mg) by mouth 2 times a day for 10 days. Take with at least 8 ounces (large glass) of water, do not lie down for 30 minutes after        See patient instructions for full plan

## 2025-02-27 NOTE — PATIENT INSTRUCTIONS
Negative for flu, strep and covid    Doxycycline  Fluids  Rest  Dayquil  Nyquil  Call if sx worsen or change or not starting to get better in 2-3d    Keep aug appt  I will communicate with you via CrossMedia regarding messages and results. If you need help with this, you can call the support line at [phone].    IT WAS A PLEASURE TO SEE YOU TODAY. THANK YOU FOR CHOOSING US FOR YOUR HEALTHCARE NEEDS.

## 2025-03-01 LAB — S PYO THROAT QL CULT: NORMAL

## 2025-03-05 ENCOUNTER — TELEPHONE (OUTPATIENT)
Dept: PRIMARY CARE | Facility: CLINIC | Age: 40
End: 2025-03-05
Payer: COMMERCIAL

## 2025-03-05 NOTE — TELEPHONE ENCOUNTER
Please call pt.  Pt has not viewed the Herrenschmiede message below:     Thanks for the info.  I recommend taking zyrtec and a steroid nasal spray (flonase sensimist, nasacort or nasonex).  You don't have strep so either viral or from drainage or from cough.

## 2025-03-19 ENCOUNTER — APPOINTMENT (OUTPATIENT)
Dept: BEHAVIORAL HEALTH | Facility: CLINIC | Age: 40
End: 2025-03-19
Payer: COMMERCIAL

## 2025-03-19 DIAGNOSIS — R41.840 CONCENTRATION DEFICIT: ICD-10-CM

## 2025-03-19 DIAGNOSIS — F33.0 MILD EPISODE OF RECURRENT MAJOR DEPRESSIVE DISORDER (CMS-HCC): ICD-10-CM

## 2025-03-19 PROBLEM — M79.605 PAIN OF LEFT LOWER EXTREMITY: Status: ACTIVE | Noted: 2025-03-19

## 2025-03-19 PROBLEM — R59.1 LYMPHADENOPATHY: Status: ACTIVE | Noted: 2025-03-19

## 2025-03-19 PROBLEM — F32.A DEPRESSIVE DISORDER: Status: ACTIVE | Noted: 2023-03-07

## 2025-03-19 PROBLEM — M25.569 KNEE PAIN: Status: ACTIVE | Noted: 2025-03-19

## 2025-03-19 PROBLEM — S63.509A WRIST SPRAIN: Status: ACTIVE | Noted: 2024-11-12

## 2025-03-19 PROBLEM — R07.89 ATYPICAL CHEST PAIN: Status: ACTIVE | Noted: 2025-03-19

## 2025-03-19 PROBLEM — M79.10 MUSCLE PAIN: Status: ACTIVE | Noted: 2025-03-19

## 2025-03-19 PROBLEM — J01.90 ACUTE BACTERIAL SINUSITIS: Status: ACTIVE | Noted: 2025-03-19

## 2025-03-19 PROBLEM — N87.9 CERVICAL ATYPISM: Status: ACTIVE | Noted: 2023-03-07

## 2025-03-19 PROBLEM — M51.369 DEGENERATION OF LUMBAR INTERVERTEBRAL DISC: Status: ACTIVE | Noted: 2022-09-15

## 2025-03-19 PROBLEM — D62 POSTOPERATIVE ANEMIA DUE TO ACUTE BLOOD LOSS: Status: ACTIVE | Noted: 2025-03-19

## 2025-03-19 PROBLEM — B96.89 ACUTE BACTERIAL SINUSITIS: Status: ACTIVE | Noted: 2025-03-19

## 2025-03-19 PROBLEM — E66.01 MORBID OBESITY (MULTI): Status: ACTIVE | Noted: 2023-03-07

## 2025-03-19 PROBLEM — R59.9 ADENOPATHY: Status: ACTIVE | Noted: 2025-03-19

## 2025-03-19 PROBLEM — R63.5 ABNORMAL WEIGHT GAIN: Status: ACTIVE | Noted: 2025-03-19

## 2025-03-19 PROBLEM — M19.90 OSTEOARTHRITIS: Status: ACTIVE | Noted: 2022-09-15

## 2025-03-19 PROBLEM — D72.829 LEUKOCYTOSIS: Status: ACTIVE | Noted: 2025-03-19

## 2025-03-19 PROBLEM — O24.419 GESTATIONAL DIABETES MELLITUS (GDM): Status: ACTIVE | Noted: 2018-04-17

## 2025-03-19 PROBLEM — M54.40 LUMBAGO WITH SCIATICA: Status: ACTIVE | Noted: 2018-11-19

## 2025-03-19 PROBLEM — S92.351A DISPLACED FRACTURE OF FIFTH METATARSAL BONE OF RIGHT FOOT: Status: ACTIVE | Noted: 2025-03-19

## 2025-03-19 PROBLEM — Z78.9 USES CONTRACEPTION: Status: ACTIVE | Noted: 2025-03-19

## 2025-03-19 PROBLEM — L03.019 CELLULITIS OF FINGER: Status: ACTIVE | Noted: 2023-05-03

## 2025-03-19 PROBLEM — M51.9 DISORDER OF INTERVERTEBRAL DISC OF LUMBAR SPINE: Status: ACTIVE | Noted: 2023-03-07

## 2025-03-19 PROBLEM — M79.89 SWELLING OF HAND: Status: ACTIVE | Noted: 2025-03-19

## 2025-03-19 PROBLEM — E03.9 HYPOTHYROIDISM: Status: ACTIVE | Noted: 2023-03-07

## 2025-03-19 PROBLEM — S46.009A ROTATOR CUFF INJURY: Status: ACTIVE | Noted: 2023-12-07

## 2025-03-19 PROBLEM — E66.01 SEVERE OBESITY (BMI >= 40) (MULTI): Status: ACTIVE | Noted: 2023-05-03

## 2025-03-19 PROBLEM — R68.89 INFLUENZA-LIKE SYMPTOMS: Status: ACTIVE | Noted: 2025-03-19

## 2025-03-19 PROBLEM — J40 BRONCHITIS: Status: ACTIVE | Noted: 2025-03-19

## 2025-03-19 PROBLEM — S46.912A LEFT SHOULDER STRAIN: Status: ACTIVE | Noted: 2023-12-07

## 2025-03-19 PROBLEM — G43.009 MIGRAINE WITHOUT AURA AND RESPONSIVE TO TREATMENT: Status: ACTIVE | Noted: 2023-03-07

## 2025-03-19 PROBLEM — M79.7 FIBROMYALGIA: Status: ACTIVE | Noted: 2025-03-19

## 2025-03-19 PROBLEM — N87.0 CERVICAL INTRAEPITHELIAL NEOPLASIA GRADE 1: Status: ACTIVE | Noted: 2022-09-15

## 2025-03-19 PROBLEM — G89.29 CHRONIC BACK PAIN: Status: ACTIVE | Noted: 2023-03-07

## 2025-03-19 PROBLEM — G47.30 SLEEP APNEA: Status: ACTIVE | Noted: 2025-03-19

## 2025-03-19 PROBLEM — M54.9 CHRONIC BACK PAIN: Status: ACTIVE | Noted: 2023-03-07

## 2025-03-19 PROCEDURE — 1036F TOBACCO NON-USER: CPT

## 2025-03-19 PROCEDURE — 99203 OFFICE O/P NEW LOW 30 MIN: CPT

## 2025-03-19 RX ORDER — IBUPROFEN 800 MG/1
1 TABLET ORAL
COMMUNITY
Start: 2024-06-26

## 2025-03-19 RX ORDER — PHENTERMINE HYDROCHLORIDE 37.5 MG/1
37.5 TABLET ORAL
COMMUNITY
Start: 2024-04-26

## 2025-03-19 RX ORDER — HYDROCODONE BITARTRATE AND ACETAMINOPHEN 5; 325 MG/1; MG/1
TABLET ORAL
COMMUNITY
Start: 2024-07-10

## 2025-03-19 RX ORDER — BUPROPION HYDROCHLORIDE 150 MG/1
150 TABLET ORAL DAILY
Qty: 90 TABLET | Refills: 0 | Status: SHIPPED | OUTPATIENT
Start: 2025-03-19 | End: 2026-03-19

## 2025-03-19 RX ORDER — AMOXICILLIN 500 MG/1
TABLET, FILM COATED ORAL
COMMUNITY
Start: 2024-07-10

## 2025-03-19 NOTE — PROGRESS NOTES
Subjective   Patient ID: Jean-Claude Ronquillo is a 39 y.o. female who presents for DAVID and MDD.    Virtual or Telephone Consent    An interactive audio and video telecommunication system which permits real time communications between the patient (at the originating site) and provider (at the distant site) was utilized to provide this telehealth service.   Verbal consent was requested and obtained from Jean-Claude Ronquillo on this date, 03/19/25 for a telehealth visit and the patient's location was confirmed at the time of the visit.     HPI  Since we last met she has been active in the school's PTA and taking care of her grandmother. With the use of venlafaxine 37.5 mg her mood stabilized. Anxiety is manageable but her mood is poor d/t low energy and poor concentration.  Denies self-harming thoughts, AVH, and SI/HI.     Past medications:  sertraline 100 mg -ineffective   Ativan.5 mg - effective no side effects  Amitriptyline 25 mg -very groggy the next day  Bupropion -does not remember the effectiveness  duloxetine 120 mg -no side effects has yet to notice a benefit  Trazodone 50 mg - headaches     Review of Systems   All other systems reviewed and are negative.        Objective   Physical Exam  Psychiatric:         Attention and Perception: Attention normal.         Mood and Affect: Mood normal.         Speech: Speech normal.         Behavior: Behavior is cooperative.         Thought Content: Thought content normal.         Cognition and Memory: Cognition normal.         Judgment: Judgment normal.       Acute risk of self-harm remains low despite current stressors (acute and chronic). No reported thoughts of self-harm plan, or intent. S/He is future-oriented, feels responsibility for her/his family, and has no hx of SA or hospitalizations.      Assessment/Plan   Continue venlafaxine 37.5 mg to target anxiety and mood.  Initiate bupropion  mg to target mood and energy.    Seek therapy to resolve depression and anxiety  by utilizing [employer].  Please download Unwinding Anxiety to lean how to resolve anxiety.   Advised to call [phone] to report any urgent concerns and/or schedule a sooner follow-up.      Provided with crisis/emergency resources, including Mobile Crisis [phone], Crisis Text Line (text 3TPCX to 699149) and the National Suicide Prevention Lifeline hotline 1-104.321.5443. Agrees to call 911 or go to the nearest emergency department if s/he feels unsafe, or has suicidal thinking with a plan or intent.      Next appointment: 5/29 at 10:00

## 2025-03-24 LAB — NON-UH HIE GP HPV: ABNORMAL

## 2025-03-25 DIAGNOSIS — M79.7 FIBROMYALGIA: ICD-10-CM

## 2025-03-25 LAB — NON-UH HIE THINPREP TIS PAP: NORMAL

## 2025-03-25 RX ORDER — GABAPENTIN 600 MG/1
TABLET ORAL
Qty: 90 TABLET | Refills: 5 | Status: SHIPPED | OUTPATIENT
Start: 2025-03-25

## 2025-03-26 ENCOUNTER — APPOINTMENT (OUTPATIENT)
Dept: NEUROLOGY | Facility: HOSPITAL | Age: 40
End: 2025-03-26
Payer: COMMERCIAL

## 2025-03-27 ENCOUNTER — HOSPITAL ENCOUNTER (OUTPATIENT)
Dept: RADIOLOGY | Facility: CLINIC | Age: 40
Discharge: HOME | End: 2025-03-27
Payer: COMMERCIAL

## 2025-03-27 DIAGNOSIS — Z12.31 SCREENING MAMMOGRAM FOR BREAST CANCER: ICD-10-CM

## 2025-03-27 LAB
NON-UH HIE GENOTYPE 16: NEGATIVE
NON-UH HIE GENOTYPE 18/45: NORMAL

## 2025-04-17 ENCOUNTER — HOSPITAL ENCOUNTER (OUTPATIENT)
Dept: RADIOLOGY | Facility: CLINIC | Age: 40
Discharge: HOME | End: 2025-04-17
Payer: COMMERCIAL

## 2025-04-17 DIAGNOSIS — Z12.31 SCREENING MAMMOGRAM FOR BREAST CANCER: ICD-10-CM

## 2025-04-17 PROCEDURE — 77067 SCR MAMMO BI INCL CAD: CPT

## 2025-04-21 ENCOUNTER — HOSPITAL ENCOUNTER (OUTPATIENT)
Dept: RADIOLOGY | Facility: EXTERNAL LOCATION | Age: 40
Discharge: HOME | End: 2025-04-21
Payer: COMMERCIAL

## 2025-04-25 ASSESSMENT — ENCOUNTER SYMPTOMS
FOCAL WEAKNESS: 1
CLUMSINESS: 1
MEMORY LOSS: 1
FATIGUE: 1
AURA: 1
NEUROLOGIC COMPLAINT: 1
BACK PAIN: 1
NECK PAIN: 1

## 2025-04-29 ENCOUNTER — PATIENT MESSAGE (OUTPATIENT)
Dept: PRIMARY CARE | Facility: CLINIC | Age: 40
End: 2025-04-29
Payer: COMMERCIAL

## 2025-04-29 DIAGNOSIS — M79.7 FIBROMYALGIA: Primary | ICD-10-CM

## 2025-04-29 RX ORDER — ORPHENADRINE CITRATE 100 MG/1
100 TABLET, EXTENDED RELEASE ORAL 2 TIMES DAILY PRN
Qty: 60 TABLET | Refills: 5 | Status: SHIPPED | OUTPATIENT
Start: 2025-04-29 | End: 2025-10-26

## 2025-04-29 RX ORDER — PREDNISONE 20 MG/1
TABLET ORAL
Qty: 20 TABLET | Refills: 0 | Status: SHIPPED | OUTPATIENT
Start: 2025-04-29

## 2025-04-30 NOTE — PROGRESS NOTES
5/2/2025 New patient visit for COCO and PAP intolerance      referred for an initial consultation with a long history of reported loud snoring, waking up feeling unrefreshed, excessive daytime fatigue. [place] Sleepiness Scale Score is [] /24. Fatigue Severity Scale Score is [] /63. Snoring VAS [] /10    Sleep study histories: (personally reviewed raw data such as interpretation report, data sheet, hypnogram, and titration table)  Home Sleep Test completed 9/15/2022 -BMI 39.06  AHI 3% was 26.7 supine was 22.0  and non supine was 28.6 events per hour  AHI 4% was 19.8, supine was 17.0 and non supine was 20.7 events per hour  Pt spent 4.5% of study time with oxygen saturations <= 90%  Pt spent 1.2%of study time with oxygen saturations <= 88%  SPO2 keaton was 80.6%    For nocturnal symptoms (without or prior to treatment), he endorses that there is  snoring, witnessed apneas, nocturia and restless sleep.    For daytime symptoms (without or prior to treatment), he endorses that there is morning headaches,  irritability and depression.    Patient does reports nasal obstruction.  It does fluctuate, and has been treated with nasal corticosteroids without significant improvements.  He does not have a history of nasal surgery or upper airway surgery. He does not have a history of nasal trauma. He does not describe a history of facial pressure type of headaches.  He does not report a history of nasal drainage, denies epistaxis.   NOSE-  [] / 20  Nasal Obstruction VAS- [] /10    PAP provided by [facility] [phone]

## 2025-05-02 ENCOUNTER — APPOINTMENT (OUTPATIENT)
Dept: OTOLARYNGOLOGY | Facility: CLINIC | Age: 40
End: 2025-05-02
Payer: COMMERCIAL

## 2025-05-02 ASSESSMENT — ENCOUNTER SYMPTOMS
NECK PAIN: 1
FOCAL WEAKNESS: 1
NEUROLOGIC COMPLAINT: 1
MEMORY LOSS: 1
CLUMSINESS: 1
FATIGUE: 1
AURA: 1
BACK PAIN: 1

## 2025-05-06 NOTE — PROGRESS NOTES
Subjective   Patient ID: Jean-Claude Ronquillo is a 39 y.o. female who presents for No chief complaint on file..  Last physical: 8/15/24; has aug appt scheduled      HPI    Fibro flare since 4/22/25; not sleeping well due to back pain  Pt started prednisone on 4/30/25; stopped 5/6/25  Orphenadrine does not help  Has not used other meds for fibro in the past  Pain dr appt-not set up yet  Cannot see rheum dr until see pain dr per     Pt started to feel off balance, swelling fingers and feet, sob, heart racing, HA, heavy upper abdomen, bloated and fuzzy in her head since 5/5/24  No room spinning  No HA or vision change  No cp, wheezing, cough  No fall or injury  No new meds or otc meds other than the prednisone  No new diet  No recent travel  no fainting  no numbness, tingling or weakness  no confusion or slurred speech  Fluids-yes  Eating-not much due to bloating  Has neuro dr-dr alvarez  Will be seeing dr costa for migraines 6/16/24    Has slept in 2 nights; drenched in sweat 2 nights ago and last night-cold sweats  No fever or chills at home  Head feels cold/clammy  Feels hot inside  No ST or ear pain  Any meds in past for sleep? Amitriptyline-groggy, trazodone-unsure of side effects    Diarrhea x 2d; liquid-starting to get soft  No blood in stool  Gas and bloating noted; tight upper abd; upper abd pain  No  belching, heartburn or vomiting  No gi dr    Lmp-not get them d/t being on the nexplanon  Will be getting it out to see if helps with wt loss  Wt not decr with decr carbs  Calories-not checking  Coffee in am-coffeemate 1tsp sugar  Lunch-veggies, hard boiled egg, thin pretzels  Dinner-protein, starch, veggies  0cal drinks  3-4mi a day walking  Has been on phentermine before  Wt loss meds not covered last yr per insurance      O:   normal gait and balance  negative [place]-Halpike maneuver  negative head thrust test  when looking upward eyes do not move apart  no nystagmus noted            Patient Care Team      Relationship Specialty Notifications Start End   Kevin Eller MD Consulting Physician Endocrinology Admissions 12/4/24     Address:  [address] [place] [address] [address] [facility], [address] [place] [address]        Review of Systems   Constitutional:  Negative for chills and fever.   HENT:  Negative for ear pain and sore throat.    Respiratory:  Negative for cough, chest tightness, shortness of breath and wheezing.    Cardiovascular:  Negative for chest pain.   Gastrointestinal:  Positive for diarrhea. Negative for abdominal pain, blood in stool and constipation.   Musculoskeletal:  Positive for arthralgias and myalgias.   Neurological:  Positive for dizziness. Negative for headaches.        Insomnia       Objective   There were no vitals taken for this visit.   BP Readings from Last 3 Encounters:   02/27/25 128/83   02/05/25 128/83   08/28/24 130/88     Wt Readings from Last 3 Encounters:   02/27/25 105 kg (231 lb 3.2 oz)   02/05/25 103 kg (226 lb 6.4 oz)   08/28/24 87.5 kg (193 lb)       Physical Exam  Constitutional:       Appearance: Normal appearance.   HENT:      Head: Normocephalic.      Right Ear: Tympanic membrane, ear canal and external ear normal.      Left Ear: Tympanic membrane, ear canal and external ear normal.      Nose: Nose normal.      Mouth/Throat:      Mouth: Mucous membranes are moist.      Pharynx: No oropharyngeal exudate or posterior oropharyngeal erythema.   Cardiovascular:      Rate and Rhythm: Normal rate and regular rhythm.      Heart sounds: Normal heart sounds.   Pulmonary:      Effort: Pulmonary effort is normal.      Breath sounds: Normal breath sounds. No wheezing, rhonchi or rales.   Abdominal:      Palpations: There is no mass.      Tenderness: There is no abdominal tenderness. There is no guarding or rebound.   Lymphadenopathy:      Cervical: No cervical adenopathy.   Neurological:      Mental Status: She is alert.      Comments: Neg romberg  Able to do alt  mvmts  Able to do finger to nose  Able to do finger to nose to finger         Assessment/Plan         See patient instructions for full plan

## 2025-05-06 NOTE — PATIENT INSTRUCTIONS
For fibro:  Start duloxetine for fibro 30mg for 7d then 60mg  Make pain  appt [phone]    For off balance, heavy, brain fog, high heartrate:  EKG today normal  Labs today  Chest xray today  Meclizine-watch for drowsiness  Next ct head and see dr costa/dr alvarez     For diarrhea:  No immodium  Can take pepto  Push fluids  Gas-x otc  If diarrhea continues x 1wk , let me know and I will order stool culture/cdif testing  Consider gi dr    Go to ER if symptoms worsen or change    Meds for weight loss:  Wegovy, saxenda, zepbound, qsymia, contrave-check pharmacy insurance for coverage.  These meds are not well covered even with a prior authorization.  Ozempic and trulicity and mounjaro are covered by your insurance with a diagnosis attached of diabetes. Without that diagnosis, it will not be covered.     If nothing is covered, you can consider:  Qsymia-$99 thru [facility]  OR  Contrave-$98 thru [facility]  These are pharmacies the  has contracted with to give a discounted price.    If nothing covered, I can prescribe phentermine once symptoms decrease.    Call:  The [employer] [facility] program is a physician supervised program that offers lifestyle counseling to promote weight loss, treatment using weight loss medications, risk assessments and personalized recommendations.  [facility] weight loss management services are available at the following location:  [facility] [[email or web address]] [place] [address]  [phone]    1200cal/d  Eat something in am  Bike outside -start 2 days 10-15min     Discuss female hormone labs with dr johnson    Discuss focus with psychiatrist    Keep aug appt    I will communicate with you via G3 regarding messages and results. If you need help with this, you can call the support line at [phone].    IT WAS A PLEASURE TO SEE YOU TODAY. THANK YOU FOR CHOOSING US FOR YOUR HEALTHCARE NEEDS.

## 2025-05-07 ENCOUNTER — OFFICE VISIT (OUTPATIENT)
Dept: PRIMARY CARE | Facility: CLINIC | Age: 40
End: 2025-05-07
Payer: COMMERCIAL

## 2025-05-07 VITALS
WEIGHT: 234 LBS | SYSTOLIC BLOOD PRESSURE: 144 MMHG | HEART RATE: 110 BPM | HEIGHT: 60 IN | BODY MASS INDEX: 45.94 KG/M2 | OXYGEN SATURATION: 97 % | DIASTOLIC BLOOD PRESSURE: 84 MMHG | TEMPERATURE: 97.3 F

## 2025-05-07 DIAGNOSIS — R42 DIZZINESS: ICD-10-CM

## 2025-05-07 DIAGNOSIS — R14.0 BLOATING: ICD-10-CM

## 2025-05-07 DIAGNOSIS — R63.5 WEIGHT GAIN: ICD-10-CM

## 2025-05-07 DIAGNOSIS — R61 NIGHT SWEATS: ICD-10-CM

## 2025-05-07 DIAGNOSIS — R73.03 PREDIABETES: ICD-10-CM

## 2025-05-07 DIAGNOSIS — E66.01 MORBID OBESITY (MULTI): ICD-10-CM

## 2025-05-07 DIAGNOSIS — F51.01 PRIMARY INSOMNIA: ICD-10-CM

## 2025-05-07 DIAGNOSIS — R00.0 TACHYCARDIA: ICD-10-CM

## 2025-05-07 DIAGNOSIS — R10.10 UPPER ABDOMINAL PAIN: ICD-10-CM

## 2025-05-07 DIAGNOSIS — R41.89 BRAIN FOG: ICD-10-CM

## 2025-05-07 DIAGNOSIS — R06.02 SOB (SHORTNESS OF BREATH): ICD-10-CM

## 2025-05-07 DIAGNOSIS — M79.7 FIBROMYALGIA: Primary | ICD-10-CM

## 2025-05-07 PROBLEM — M51.369 DEGENERATION OF LUMBAR INTERVERTEBRAL DISC: Status: RESOLVED | Noted: 2022-09-15 | Resolved: 2025-05-07

## 2025-05-07 PROBLEM — G43.009 MIGRAINE WITHOUT AURA AND WITHOUT STATUS MIGRAINOSUS, NOT INTRACTABLE: Status: RESOLVED | Noted: 2023-03-07 | Resolved: 2025-05-07

## 2025-05-07 PROBLEM — M25.569 KNEE PAIN: Status: RESOLVED | Noted: 2025-03-19 | Resolved: 2025-05-07

## 2025-05-07 PROBLEM — R59.9 ADENOPATHY: Status: RESOLVED | Noted: 2025-03-19 | Resolved: 2025-05-07

## 2025-05-07 PROBLEM — E66.813 CLASS 3 SEVERE OBESITY IN ADULT: Status: RESOLVED | Noted: 2020-02-04 | Resolved: 2025-05-07

## 2025-05-07 PROBLEM — M79.10 MUSCLE PAIN: Status: RESOLVED | Noted: 2025-03-19 | Resolved: 2025-05-07

## 2025-05-07 PROBLEM — S46.912A LEFT SHOULDER STRAIN: Status: RESOLVED | Noted: 2023-12-07 | Resolved: 2025-05-07

## 2025-05-07 PROBLEM — M51.9 DISORDER OF INTERVERTEBRAL DISC OF LUMBAR SPINE: Status: RESOLVED | Noted: 2023-03-07 | Resolved: 2025-05-07

## 2025-05-07 PROBLEM — F32.A DEPRESSIVE DISORDER: Status: RESOLVED | Noted: 2023-03-07 | Resolved: 2025-05-07

## 2025-05-07 PROBLEM — Z78.9 USES CONTRACEPTION: Status: RESOLVED | Noted: 2025-03-19 | Resolved: 2025-05-07

## 2025-05-07 PROBLEM — G47.30 SLEEP APNEA: Status: RESOLVED | Noted: 2025-03-19 | Resolved: 2025-05-07

## 2025-05-07 PROBLEM — R59.1 LYMPHADENOPATHY: Status: RESOLVED | Noted: 2025-03-19 | Resolved: 2025-05-07

## 2025-05-07 PROBLEM — O24.419 GESTATIONAL DIABETES MELLITUS (GDM): Status: RESOLVED | Noted: 2018-04-17 | Resolved: 2025-05-07

## 2025-05-07 PROBLEM — R68.89 INFLUENZA-LIKE SYMPTOMS: Status: RESOLVED | Noted: 2025-03-19 | Resolved: 2025-05-07

## 2025-05-07 PROBLEM — R07.89 ATYPICAL CHEST PAIN: Status: RESOLVED | Noted: 2025-03-19 | Resolved: 2025-05-07

## 2025-05-07 PROBLEM — J01.90 ACUTE BACTERIAL SINUSITIS: Status: RESOLVED | Noted: 2025-03-19 | Resolved: 2025-05-07

## 2025-05-07 PROBLEM — J40 BRONCHITIS: Status: RESOLVED | Noted: 2025-03-19 | Resolved: 2025-05-07

## 2025-05-07 PROBLEM — M79.89 SWELLING OF HAND: Status: RESOLVED | Noted: 2025-03-19 | Resolved: 2025-05-07

## 2025-05-07 PROBLEM — S63.509A WRIST SPRAIN: Status: RESOLVED | Noted: 2024-11-12 | Resolved: 2025-05-07

## 2025-05-07 PROBLEM — M79.605 PAIN OF LEFT LOWER EXTREMITY: Status: RESOLVED | Noted: 2025-03-19 | Resolved: 2025-05-07

## 2025-05-07 PROBLEM — Z86.32 H/O GESTATIONAL DIABETES MELLITUS, NOT CURRENTLY PREGNANT: Status: ACTIVE | Noted: 2025-05-07

## 2025-05-07 PROBLEM — L03.019 CELLULITIS OF FINGER: Status: RESOLVED | Noted: 2023-05-03 | Resolved: 2025-05-07

## 2025-05-07 PROBLEM — B96.89 ACUTE BACTERIAL SINUSITIS: Status: RESOLVED | Noted: 2025-03-19 | Resolved: 2025-05-07

## 2025-05-07 PROBLEM — E03.9 HYPOTHYROID: Status: RESOLVED | Noted: 2023-03-07 | Resolved: 2025-05-07

## 2025-05-07 PROBLEM — M54.40 LUMBAGO WITH SCIATICA: Status: RESOLVED | Noted: 2018-11-19 | Resolved: 2025-05-07

## 2025-05-07 PROCEDURE — 99214 OFFICE O/P EST MOD 30 MIN: CPT | Performed by: NURSE PRACTITIONER

## 2025-05-07 PROCEDURE — 93000 ELECTROCARDIOGRAM COMPLETE: CPT | Performed by: NURSE PRACTITIONER

## 2025-05-07 PROCEDURE — 3008F BODY MASS INDEX DOCD: CPT | Performed by: NURSE PRACTITIONER

## 2025-05-07 PROCEDURE — 1036F TOBACCO NON-USER: CPT | Performed by: NURSE PRACTITIONER

## 2025-05-07 RX ORDER — DULOXETIN HYDROCHLORIDE 60 MG/1
60 CAPSULE, DELAYED RELEASE ORAL DAILY
Qty: 30 CAPSULE | Refills: 2 | Status: SHIPPED | OUTPATIENT
Start: 2025-05-07 | End: 2026-05-07

## 2025-05-07 RX ORDER — MECLIZINE HYDROCHLORIDE 25 MG/1
12.5-25 TABLET ORAL 3 TIMES DAILY PRN
Qty: 30 TABLET | Refills: 1 | Status: SHIPPED | OUTPATIENT
Start: 2025-05-07 | End: 2026-05-07

## 2025-05-07 RX ORDER — TRAZODONE HYDROCHLORIDE 50 MG/1
25-100 TABLET ORAL NIGHTLY PRN
Qty: 60 TABLET | Refills: 0 | Status: CANCELLED | OUTPATIENT
Start: 2025-05-07 | End: 2026-05-07

## 2025-05-07 RX ORDER — DULOXETIN HYDROCHLORIDE 30 MG/1
30 CAPSULE, DELAYED RELEASE ORAL DAILY
Qty: 7 CAPSULE | Refills: 0 | Status: SHIPPED | OUTPATIENT
Start: 2025-05-07 | End: 2025-05-14

## 2025-05-07 ASSESSMENT — ENCOUNTER SYMPTOMS
CONSTIPATION: 0
HEADACHES: 0
DIZZINESS: 1
ARTHRALGIAS: 1
ABDOMINAL PAIN: 0
CHEST TIGHTNESS: 0
SHORTNESS OF BREATH: 0
CHILLS: 0
WHEEZING: 0
FEVER: 0
MYALGIAS: 1
DIARRHEA: 1
SORE THROAT: 0
BLOOD IN STOOL: 0
COUGH: 0

## 2025-05-08 DIAGNOSIS — R79.89 HIGH SERUM THYROID STIMULATING HORMONE (TSH): ICD-10-CM

## 2025-05-08 DIAGNOSIS — R79.89 ABNORMAL CBC: ICD-10-CM

## 2025-05-08 DIAGNOSIS — E87.1 HYPONATREMIA: Primary | ICD-10-CM

## 2025-05-08 DIAGNOSIS — E83.51 HYPOCALCEMIA: ICD-10-CM

## 2025-05-08 LAB
ALBUMIN SERPL-MCNC: 4 G/DL (ref 3.6–5.1)
ALP SERPL-CCNC: 79 U/L (ref 31–125)
ALT SERPL-CCNC: 34 U/L (ref 6–29)
AMYLASE SERPL-CCNC: 34 U/L (ref 21–101)
ANION GAP SERPL CALCULATED.4IONS-SCNC: 10 MMOL/L (CALC) (ref 7–17)
AST SERPL-CCNC: 26 U/L (ref 10–30)
BASOPHILS # BLD AUTO: 58 CELLS/UL (ref 0–200)
BASOPHILS NFR BLD AUTO: 0.5 %
BILIRUB SERPL-MCNC: 0.3 MG/DL (ref 0.2–1.2)
BUN SERPL-MCNC: 12 MG/DL (ref 7–25)
CALCIUM SERPL-MCNC: 8.3 MG/DL (ref 8.6–10.2)
CHLORIDE SERPL-SCNC: 103 MMOL/L (ref 98–110)
CO2 SERPL-SCNC: 21 MMOL/L (ref 20–32)
CORTIS SERPL-MCNC: 14.9 MCG/DL
CREAT SERPL-MCNC: 0.83 MG/DL (ref 0.5–0.99)
EGFRCR SERPLBLD CKD-EPI 2021: 91 ML/MIN/1.73M2
EOSINOPHIL # BLD AUTO: 242 CELLS/UL (ref 15–500)
EOSINOPHIL NFR BLD AUTO: 2.1 %
ERYTHROCYTE [DISTWIDTH] IN BLOOD BY AUTOMATED COUNT: 13.4 % (ref 11–15)
EST. AVERAGE GLUCOSE BLD GHB EST-MCNC: 117 MG/DL
EST. AVERAGE GLUCOSE BLD GHB EST-SCNC: 6.5 MMOL/L
GLUCOSE SERPL-MCNC: 92 MG/DL (ref 65–99)
HBA1C MFR BLD: 5.7 %
HCT VFR BLD AUTO: 48.7 % (ref 35–45)
HGB BLD-MCNC: 16.2 G/DL (ref 11.7–15.5)
LIPASE SERPL-CCNC: 11 U/L (ref 7–60)
LYMPHOCYTES # BLD AUTO: 1587 CELLS/UL (ref 850–3900)
LYMPHOCYTES NFR BLD AUTO: 13.8 %
MCH RBC QN AUTO: 29.7 PG (ref 27–33)
MCHC RBC AUTO-ENTMCNC: 33.3 G/DL (ref 32–36)
MCV RBC AUTO: 89.4 FL (ref 80–100)
MONOCYTES # BLD AUTO: 851 CELLS/UL (ref 200–950)
MONOCYTES NFR BLD AUTO: 7.4 %
NEUTROPHILS # BLD AUTO: 8763 CELLS/UL (ref 1500–7800)
NEUTROPHILS NFR BLD AUTO: 76.2 %
PLATELET # BLD AUTO: 380 THOUSAND/UL (ref 140–400)
PMV BLD REES-ECKER: 9.2 FL (ref 7.5–12.5)
POTASSIUM SERPL-SCNC: 4.1 MMOL/L (ref 3.5–5.3)
PROT SERPL-MCNC: 6.8 G/DL (ref 6.1–8.1)
RBC # BLD AUTO: 5.45 MILLION/UL (ref 3.8–5.1)
SODIUM SERPL-SCNC: 134 MMOL/L (ref 135–146)
T4 FREE SERPL-MCNC: 1.1 NG/DL (ref 0.8–1.8)
TSH SERPL-ACNC: 5.67 MIU/L
WBC # BLD AUTO: 11.5 THOUSAND/UL (ref 3.8–10.8)

## 2025-05-15 ENCOUNTER — OFFICE VISIT (OUTPATIENT)
Dept: PAIN MEDICINE | Facility: CLINIC | Age: 40
End: 2025-05-15
Payer: COMMERCIAL

## 2025-05-15 VITALS
RESPIRATION RATE: 18 BRPM | OXYGEN SATURATION: 98 % | HEART RATE: 97 BPM | HEIGHT: 60 IN | DIASTOLIC BLOOD PRESSURE: 89 MMHG | WEIGHT: 220 LBS | BODY MASS INDEX: 43.19 KG/M2 | TEMPERATURE: 97.2 F | SYSTOLIC BLOOD PRESSURE: 139 MMHG

## 2025-05-15 DIAGNOSIS — M47.817 LUMBOSACRAL SPONDYLOSIS WITHOUT MYELOPATHY: Primary | ICD-10-CM

## 2025-05-15 DIAGNOSIS — M79.7 FIBROMYALGIA: ICD-10-CM

## 2025-05-15 PROCEDURE — 1036F TOBACCO NON-USER: CPT | Performed by: ANESTHESIOLOGY

## 2025-05-15 PROCEDURE — 3008F BODY MASS INDEX DOCD: CPT | Performed by: ANESTHESIOLOGY

## 2025-05-15 PROCEDURE — 99214 OFFICE O/P EST MOD 30 MIN: CPT | Performed by: ANESTHESIOLOGY

## 2025-05-15 PROCEDURE — 99204 OFFICE O/P NEW MOD 45 MIN: CPT | Performed by: ANESTHESIOLOGY

## 2025-05-15 RX ORDER — CELECOXIB 200 MG/1
200 CAPSULE ORAL DAILY
Qty: 30 CAPSULE | Refills: 0 | Status: SHIPPED | OUTPATIENT
Start: 2025-05-15

## 2025-05-15 ASSESSMENT — LIFESTYLE VARIABLES
HOW MANY STANDARD DRINKS CONTAINING ALCOHOL DO YOU HAVE ON A TYPICAL DAY: 1 OR 2
HOW OFTEN DO YOU HAVE SIX OR MORE DRINKS ON ONE OCCASION: NEVER
TOTAL SCORE: 8
SKIP TO QUESTIONS 9-10: 1
HOW OFTEN DO YOU HAVE A DRINK CONTAINING ALCOHOL: MONTHLY OR LESS
AUDIT-C TOTAL SCORE: 1

## 2025-05-15 ASSESSMENT — COLUMBIA-SUICIDE SEVERITY RATING SCALE - C-SSRS
1. IN THE PAST MONTH, HAVE YOU WISHED YOU WERE DEAD OR WISHED YOU COULD GO TO SLEEP AND NOT WAKE UP?: NO
6. HAVE YOU EVER DONE ANYTHING, STARTED TO DO ANYTHING, OR PREPARED TO DO ANYTHING TO END YOUR LIFE?: NO
2. HAVE YOU ACTUALLY HAD ANY THOUGHTS OF KILLING YOURSELF?: NO

## 2025-05-15 ASSESSMENT — ENCOUNTER SYMPTOMS
BLOOD IN STOOL: 0
ADENOPATHY: 0
FEVER: 0
BACK PAIN: 1
NECK PAIN: 1
LOSS OF SENSATION IN FEET: 0
DIFFICULTY URINATING: 0
OCCASIONAL FEELINGS OF UNSTEADINESS: 0
DEPRESSION: 0
WEAKNESS: 0
EYE PAIN: 0
ARTHRALGIAS: 1
SHORTNESS OF BREATH: 0

## 2025-05-15 ASSESSMENT — PATIENT HEALTH QUESTIONNAIRE - PHQ9
SUM OF ALL RESPONSES TO PHQ9 QUESTIONS 1 AND 2: 0
1. LITTLE INTEREST OR PLEASURE IN DOING THINGS: NOT AT ALL
2. FEELING DOWN, DEPRESSED OR HOPELESS: NOT AT ALL

## 2025-05-15 ASSESSMENT — PAIN SCALES - GENERAL
PAINLEVEL_OUTOF10: 5
PAINLEVEL_OUTOF10: 5 - MODERATE PAIN

## 2025-05-15 ASSESSMENT — PAIN - FUNCTIONAL ASSESSMENT: PAIN_FUNCTIONAL_ASSESSMENT: 0-10

## 2025-05-15 ASSESSMENT — PAIN DESCRIPTION - DESCRIPTORS: DESCRIPTORS: ACHING;BURNING

## 2025-05-15 NOTE — PROGRESS NOTES
Chief Complain    New Patient Visit (For generalized body pain, a lot in the back area,knees and back, that's been going on for 15 years. Deny neck and back surgery. Some knee pain. Have images on file. Currently taking gabapentin and helps with flares ups. Was refrer by Maria Luz Gorman.)    History Of Present Illness  Jean-Claude Ronquillo is a 39 y.o. female here for evaluation of widespread chronic pain worse in the back and neck. The patient has been experiencing these symptoms for last 15 year(s). The patient describes the pain as dull, burning. The patient's current pain score is 5 on a scale from 0-10. The pain is worsened by walking and activities and is alleviated by nothing relieves the pain. Since the start of the symptoms the pain has been worse.    The patient denies any fever, chills, weight loss, weakness, numbness, bladder/ bowel incontinence, history of cancer, history of IV drug abuse, recent trauma.      Past Medical History  She has a past medical history of ADHD (attention deficit hyperactivity disorder), Allergic, Anxiety, Arthritis, Chronic pain disorder, CPAP (continuous positive airway pressure) dependence (), Depression, Disease of thyroid gland, Fibromyalgia, primary (), Fractures (), GERD (gastroesophageal reflux disease), Gestational diabetes mellitus in pregnancy, unspecified control, Hiatal hernia, Hypothyroidism, Liver disease (2024), Low grade squamous intraepithelial lesion on cytologic smear of cervix (LGSIL), Lumbar disc disease (), Methicillin resistant Staphylococcus aureus infection (2008), Migraines, Neuromuscular disorder (Multi), Obesity, Other conditions influencing health status, Sleep apnea, and Sleep difficulties.    Surgical History  She has a past surgical history that includes Other surgical history (2021); Other surgical history (2021); Other surgical history (2021); Other surgical history (2021);  section, low  transverse (6-22-18); [place] tooth extraction; and Cholecystectomy (08/2024).    Social History  She reports that she quit smoking about 10 years ago. Her smoking use included cigarettes. She has a 7.5 pack-year smoking history. She has been exposed to tobacco smoke. She has never used smokeless tobacco. She reports that she does not drink alcohol and does not use drugs.    Family History  Family History[1]     Allergies  Prednisone, Cat dander, and Topiramate    Review of Systems  Review of Systems   Constitutional:  Negative for fever.   HENT:  Negative for ear pain.    Eyes:  Negative for pain.   Respiratory:  Negative for shortness of breath.    Cardiovascular:  Negative for chest pain.   Gastrointestinal:  Negative for blood in stool.   Endocrine: Negative for cold intolerance and heat intolerance.   Genitourinary:  Negative for difficulty urinating.   Musculoskeletal:  Positive for arthralgias, back pain and neck pain.   Skin:  Negative for rash.   Allergic/Immunologic: Negative for food allergies.   Neurological:  Negative for weakness.   Hematological:  Negative for adenopathy.   Psychiatric/Behavioral:  Negative for suicidal ideas.         Physical Exam  Physical Exam  HENT:      Head: Normocephalic.   Eyes:      Extraocular Movements: Extraocular movements intact.      Pupils: Pupils are equal, round, and reactive to light.   Cardiovascular:      Rate and Rhythm: Normal rate and regular rhythm.   Pulmonary:      Effort: Pulmonary effort is normal.   Skin:     General: Skin is warm.   Neurological:      General: No focal deficit present.      Mental Status: She is alert and oriented to person, place, and time.      Motor: Motor function is intact.      Coordination: Coordination is intact.      Gait: Gait is intact.      Deep Tendon Reflexes:      Reflex Scores:       Tricep reflexes are 2+ on the right side and 2+ on the left side.       Bicep reflexes are 2+ on the right side and 2+ on the left side.        Brachioradialis reflexes are 2+ on the right side and 2+ on the left side.       Patellar reflexes are 2+ on the right side and 2+ on the left side.       Achilles reflexes are 2+ on the right side and 2+ on the left side.  Psychiatric:         Mood and Affect: Mood normal.         Behavior: Behavior normal.           Last Recorded Vitals  Blood pressure 139/89, pulse 97, temperature 36.2 °C (97.2 °F), resp. rate 18, height 1.524 m (5'), weight 99.8 kg (220 lb), SpO2 98%.    Reviewed Images  Reviewed and independently interpreted MRI Lumbar spine done in 2021 revealed no severe spinal canal or neural foraminal narrowing, normal alignment, slight effusion and arthrosis of L4-5 facet joint    Reviewed Labs   Latest Reference Range & Units 05/07/25 12:44   WHITE BLOOD CELL COUNT 3.8 - 10.8 Thousand/uL 11.5 (H)   RED BLOOD CELL COUNT 3.80 - 5.10 Million/uL 5.45 (H)   HEMOGLOBIN 11.7 - 15.5 g/dL 16.2 (H)   HEMATOCRIT 35.0 - 45.0 % 48.7 (H)   MCV 80.0 - 100.0 fL 89.4   MCH 27.0 - 33.0 pg 29.7   MCHC 32.0 - 36.0 g/dL 33.3   RDW 11.0 - 15.0 % 13.4   PLATELET COUNT 140 - 400 Thousand/uL 380   MPV 7.5 - 12.5 fL 9.2   (H): Data is abnormally high     Latest Reference Range & Units 05/07/25 12:44   GLUCOSE 65 - 99 mg/dL 92   SODIUM 135 - 146 mmol/L 134 (L)   POTASSIUM 3.5 - 5.3 mmol/L 4.1   CHLORIDE 98 - 110 mmol/L 103   CARBON DIOXIDE 20 - 32 mmol/L 21   ELECTROLYTE BALANCE 7 - 17 mmol/L (calc) 10   UREA NITROGEN (BUN) 7 - 25 mg/dL 12   CREATININE 0.50 - 0.99 mg/dL 0.83   EGFR > OR = 60 mL/min/1.73m2 91   CALCIUM 8.6 - 10.2 mg/dL 8.3 (L)   ALBUMIN 3.6 - 5.1 g/dL 4.0   PROTEIN, TOTAL 6.1 - 8.1 g/dL 6.8   ALKALINE PHOSPHATASE 31 - 125 U/L 79   ALT 6 - 29 U/L 34 (H)   AST 10 - 30 U/L 26   BILIRUBIN, TOTAL 0.2 - 1.2 mg/dL 0.3   (L): Data is abnormally low  (H): Data is abnormally high     Assessment/Plan   Encounter Diagnoses   Name Primary?    Fibromyalgia     Lumbosacral spondylosis without myelopathy Yes        Tabbatha Givens  is a 39 y.o. female here for evaluation of chronic widespread pain however worse pain located in axial low back area.  She has been experiencing the symptoms for years.  Gradually been getting worse.  She has previously tried physical therapy, with limited benefit.  Currently taking over-the-counter NSAIDs, gabapentin, Cymbalta has not received any major benefit.  Previously was also on Lyrica, tried topiramate however she developed allergic reaction to it.  In the past she has been evaluated Dr. Onitveros who recommended ketamine infusions however it was denied by the insurance.  On physical examination today she has increased pain and tenderness lower lumbar paraspinous area with increased pain with extension and rotation.  On MRI of her lumbar spine does have some facet arthrosis and fusion at L4-5.  I would recommend trial of diagnostic and therapeutic bilateral L4-5 facet joint injection as next step in managing her axial low back pain.  Would send her for aqua therapy for her fibromyalgia pain, will trial her on Celebrex for symptomatic pain relief.         Jason Meeks MD       [1]   Family History  Problem Relation Name Age of Onset    Anxiety disorder Mother Jess silverio     Alcohol abuse Mother Jess silverio     Other (degenerative disc disease) Mother Jess silverio     Hypertension Mother Jess silverio     Other (Lyme disease) Mother Jess silverio     Depression Mother Jess silverio     Arthritis Mother Jess silverio     COPD Mother Jess sliverio     Depression Father Ciaran fernando     Bipolar disorder Father Ciaran king     Arthritis Father Ciaran fernando     Drug abuse Father Ciaran fernando     Hearing loss Father Ciaran fernando     ADD / ADHD Sister      Stroke Brother Shawn     Anxiety disorder Father's Sister B     Arthritis Maternal Grandfather Du     Cancer Maternal Grandmother Taniya     Anxiety disorder Paternal Grandmother Cuca     Diabetes Paternal Grandmother Cuca      Osteoporosis Paternal Grandmother Cuca     Arthritis Paternal Grandmother Cuca     Hearing loss Paternal Grandmother Cuca     Hypertension Paternal Grandmother Cuca     ADD / ADHD Daughter Jo     Depression Daughter Jo     ADD / ADHD Sister Linda     Neurofibromatosis Sister Linda     Migraines Sister Linda     ADD / ADHD Sister Linda

## 2025-05-16 ENCOUNTER — PATIENT MESSAGE (OUTPATIENT)
Dept: PRIMARY CARE | Facility: CLINIC | Age: 40
End: 2025-05-16
Payer: COMMERCIAL

## 2025-05-17 ASSESSMENT — ENCOUNTER SYMPTOMS
MEMORY LOSS: 1
NEUROLOGIC COMPLAINT: 1
AURA: 1
NECK PAIN: 1
CLUMSINESS: 1
FOCAL WEAKNESS: 1
BACK PAIN: 1
FATIGUE: 1

## 2025-05-18 RX ORDER — PHENTERMINE HYDROCHLORIDE 37.5 MG/1
37.5 TABLET ORAL
Qty: 90 TABLET | Refills: 0 | Status: SHIPPED | OUTPATIENT
Start: 2025-05-18 | End: 2025-08-16

## 2025-05-20 NOTE — PROGRESS NOTES
"5/23/2025 New patient visit for COCO and PAP intolerance      referred for an initial consultation with a long history of reported loud snoring, waking up feeling unrefreshed, excessive daytime fatigue.     Sleep study histories: (personally reviewed raw data such as interpretation report, data sheet, hypnogram, and titration table)  Home Sleep Test completed 9/15/2022 -BMI 39.06  AHI 3% was 26.7 supine was 22.0  and non supine was 28.6 events per hour  AHI 4% was 19.8, supine was 17.0 and non supine was 20.7 events per hour  Pt spent 4.5% of study time with oxygen saturations <= 90%  Pt spent 1.2%of study time with oxygen saturations <= 88%  SPO2 keaton was 80.6%    PAP provided by [facility] [phone]  Compliance report 2/24/2024 - 5/23/2024  Usage 10/90 -11%  > 4hrs 3/90  -3%  < 4hrs 7/90  -8%  Avg AHI 2.8    \"I hate my PAP. I am at the point where if I use it it will keep me awake all night.\"   Patient is now intolerance to CPAP and complaints of intrusiveness and claustrophobia.     For nocturnal symptoms (without or prior to treatment), he endorses that there is  snoring, witnessed apneas, nocturia and restless sleep.    For daytime symptoms (without or prior to treatment), he endorses that there is morning headaches,  irritability and depression.    Patient does not reports nasal obstruction.    PHYSICAL EXAMINATION:  Constitutional:  No acute distress  Voice:  No hoarseness or other abnormality   Respiration:  Breathing comfortably, no stridor  Eyes:  EOM intact, sclera normal  Neuro:  Alert and conversive  Head and Face:  Symmetric facial features, no masses or lesions. Some redness/flaky skin around forehead and cheeks.   Salivary Glands:  Parotid and submandibular glands normal bilaterally  Right Ear:  Normal external ear, external auditory canal, and TM to otoscopy  Left Ear: Normal external ear, external auditory canal, and TM to otoscopy  Nose:  External nose midline, anterior rhinoscopy " is normal with limited visualization to the anterior aspect of the inferior turbinates, no bleeding or drainage, no lesions  Oral Cavity/Oropharynx/Lips:  Normal mucous membranes, normal floor of mouth/tongue/OP, no masses or lesions, tonsils 2+. Mallampati/Melvin grade 3  Neck/Lymph:  No LAD, no thyroid masses, trachea midline  Skin:  Neck skin is without scar or injury  Psych:  Alert with appropriate mood and affect    Procedure: Diagnostic Nasal/ Pharyngeal Endoscopy    Indication for procedure: To evaluate sinonasal and pharyngeal anatomy not visible by anterior rhinoscopy and oral cavitiy examination    Anesthesia: 1% phenylephrine,4% lidocaine topical spray    Description:   A flexible endoscope was used to examine the left and right nasal cavities.  The nasal valve areas were examined for abnormalities or collapse.  The inferior and middle turbinates were evaluated and abnormalities noted.  The middle and superior meastuses, and the sphenoethmoid recesses were examined and inspected for mucopurulence and polyps.  The nasopharyngeal anatomy including posterior nasopharyngeal wall, auditory jenn soft palate, and fossae of Rosenmuller were examined.  Abnormalities were noted in the above mentioned findings.  The patient tolerated the procedure without complications and was returned to ambulatory status.      Findings:   Internal nasal valve - no significant collapse  Inferior turbinates - bilat enlarged, pale mucosa  Septum - septal deviation to the left  Middle meatuses - Patent and clear.  Superior meatuses and sphenoethmoid recesses - patent and clear  Nasopharynx - clear without lesions or masses  Coupling - Positive  Morris Maneuver:III  Lingual tonsils grade:II  Vocal Fold Visualization:III  There is significant crowding of the airway due to fat pad infiltration to tongue base and lateral walls of pharynx.            Plan:  It is explained to patient that if weight loss is achieved it can significantly  improve COCO symptoms such as daytime sleepiness, irritability. There is also an overall improvement in cardiovascular and metabolic health. The loss of just 10-15% can reduce the severity of COCO by 50% in moderately obese patients. Patient is referred to Medical Weight Management program to help achieve and maintain weight loss success.    Once patient BMI is closer to Insp[facility] indications we could discuss therapy.

## 2025-05-22 DIAGNOSIS — E83.51 HYPOCALCEMIA: ICD-10-CM

## 2025-05-22 DIAGNOSIS — R79.89 ABNORMAL CBC: ICD-10-CM

## 2025-05-22 DIAGNOSIS — E87.1 HYPONATREMIA: ICD-10-CM

## 2025-05-22 ASSESSMENT — ENCOUNTER SYMPTOMS
AURA: 1
NECK PAIN: 1
CLUMSINESS: 1
FATIGUE: 1
MEMORY LOSS: 1
NEUROLOGIC COMPLAINT: 1
FOCAL WEAKNESS: 1
BACK PAIN: 1

## 2025-05-23 ENCOUNTER — APPOINTMENT (OUTPATIENT)
Dept: OTOLARYNGOLOGY | Facility: CLINIC | Age: 40
End: 2025-05-23
Payer: COMMERCIAL

## 2025-05-23 VITALS
TEMPERATURE: 98.6 F | DIASTOLIC BLOOD PRESSURE: 86 MMHG | WEIGHT: 234 LBS | HEIGHT: 60 IN | SYSTOLIC BLOOD PRESSURE: 121 MMHG | BODY MASS INDEX: 45.94 KG/M2 | HEART RATE: 109 BPM

## 2025-05-23 DIAGNOSIS — G47.33 OSA (OBSTRUCTIVE SLEEP APNEA): ICD-10-CM

## 2025-05-23 PROCEDURE — 31575 DIAGNOSTIC LARYNGOSCOPY: CPT

## 2025-05-23 PROCEDURE — 99214 OFFICE O/P EST MOD 30 MIN: CPT | Mod: 25

## 2025-05-23 SDOH — ECONOMIC STABILITY: FOOD INSECURITY: WITHIN THE PAST 12 MONTHS, THE FOOD YOU BOUGHT JUST DIDN'T LAST AND YOU DIDN'T HAVE MONEY TO GET MORE.: NEVER TRUE

## 2025-05-23 SDOH — ECONOMIC STABILITY: FOOD INSECURITY: WITHIN THE PAST 12 MONTHS, YOU WORRIED THAT YOUR FOOD WOULD RUN OUT BEFORE YOU GOT MONEY TO BUY MORE.: NEVER TRUE

## 2025-05-23 ASSESSMENT — ENCOUNTER SYMPTOMS
OCCASIONAL FEELINGS OF UNSTEADINESS: 0
DEPRESSION: 0
LOSS OF SENSATION IN FEET: 0

## 2025-05-23 ASSESSMENT — LIFESTYLE VARIABLES
HOW MANY STANDARD DRINKS CONTAINING ALCOHOL DO YOU HAVE ON A TYPICAL DAY: PATIENT DOES NOT DRINK
AUDIT-C TOTAL SCORE: 0
SKIP TO QUESTIONS 9-10: 1
HOW OFTEN DO YOU HAVE A DRINK CONTAINING ALCOHOL: NEVER
HOW OFTEN DO YOU HAVE SIX OR MORE DRINKS ON ONE OCCASION: NEVER

## 2025-05-23 ASSESSMENT — PATIENT HEALTH QUESTIONNAIRE - PHQ9
1. LITTLE INTEREST OR PLEASURE IN DOING THINGS: NOT AT ALL
SUM OF ALL RESPONSES TO PHQ9 QUESTIONS 1 AND 2: 0
2. FEELING DOWN, DEPRESSED OR HOPELESS: NOT AT ALL

## 2025-05-23 ASSESSMENT — COLUMBIA-SUICIDE SEVERITY RATING SCALE - C-SSRS
2. HAVE YOU ACTUALLY HAD ANY THOUGHTS OF KILLING YOURSELF?: NO
6. HAVE YOU EVER DONE ANYTHING, STARTED TO DO ANYTHING, OR PREPARED TO DO ANYTHING TO END YOUR LIFE?: NO
1. IN THE PAST MONTH, HAVE YOU WISHED YOU WERE DEAD OR WISHED YOU COULD GO TO SLEEP AND NOT WAKE UP?: NO

## 2025-05-23 ASSESSMENT — PAIN SCALES - GENERAL: PAINLEVEL_OUTOF10: 0-NO PAIN

## 2025-05-23 NOTE — LETTER
Endocrine provider for weight loss  Dr Obdulio Encarnacion [phone]                  Oral Appliance for Obstructive Sleep Apnea     A dental referral has been ordered.   Call the dental office to schedule an appointment.       Below are a list of dentists to whom our group often refers patients for Oral Appliance:     Dr. Nasir Marin  [place], [place]: [phone]     Dr. oRman [place], [place]: [phone]     Dr. Dre Potter  [place], [place]: [phone]     Dr. Maxx Medel /[facility]: [phone]    Dr Anatoliy Del Rio /[place]: [phone]     [facility]:  Dr. Martir Roger  Phone: [phone] or [phone]    Dr. Elia [place], [place]: [phone]    Dr Galindo Villal[place], [place]: [phone]  Email: [email or web address]     Dr. Rafa Woo, [place]: [phone]     Dr. Ciaran Casas, [place]: [phone]     Dr. Dashawn [place], [place]: p. [phone] and f. [phone]     Dr. Brady Lujan, [place]: [phone]    Dr Marilyn Alt[place], [place]: [phone]            Follow Up Appointment with Dr Mirza  Call Dr Mirza's office to schedule an appointment after you have been wearing your oral appliance for several months. When dentist states you are ready for a sleep study wearing your oral appliance   Dr Mirza's office: [phone]

## 2025-05-29 ENCOUNTER — APPOINTMENT (OUTPATIENT)
Dept: BEHAVIORAL HEALTH | Facility: CLINIC | Age: 40
End: 2025-05-29
Payer: COMMERCIAL

## 2025-05-29 DIAGNOSIS — F33.0 MILD EPISODE OF RECURRENT MAJOR DEPRESSIVE DISORDER: ICD-10-CM

## 2025-05-29 PROCEDURE — 99212 OFFICE O/P EST SF 10 MIN: CPT

## 2025-05-29 NOTE — PROGRESS NOTES
Subjective   Patient ID: Jean-Claude Ronquillo is a 39 y.o. female who presents for DAVID and MDD.     Virtual or Telephone Consent    An interactive audio and video telecommunication system which permits real time communications between the patient (at the originating site) and provider (at the distant site) was utilized to provide this telehealth service.   Verbal consent was requested and obtained from Jean-Claude Ronquillo on this date, 05/29/25 for a telehealth visit and the patient's location was confirmed at the time of the visit.     HPI  With the use of bupropion  mg she has yet to notice a difference in her mood or energy level.  She remains frustrated with her father and is willing to dissolve their relationship. She would like for him to take accountable for his actions.   Denies self-harming thoughts, AVH, and SI/HI.      Past medications:  sertraline 100 mg -ineffective   Ativan.5 mg - effective no side effects  Amitriptyline 25 mg -very groggy the next day  Bupropion -does not remember the effectiveness  duloxetine 120 mg -no side effects has yet to notice a benefit  Trazodone 50 mg - headaches     Review of Systems   All other systems reviewed and are negative.        Objective   Physical Exam  Psychiatric:         Attention and Perception: Attention normal.         Mood and Affect: Mood normal.         Speech: Speech normal.         Behavior: Behavior is cooperative.         Thought Content: Thought content normal.         Cognition and Memory: Cognition normal.         Judgment: Judgment normal.     Acute risk of self-harm remains low despite current stressors (acute and chronic). No reported thoughts of self-harm plan, or intent. S/He is future-oriented, feels responsibility for her/his family, and has no hx of SA or hospitalizations.       Assessment/Plan   Discontinue venlafaxine 37.5 mg.  Continue  bupropion  mg to target mood and energy.    Continue duloxetine 60 mg as directed by your PCP.    Seek therapy to resolve depression and anxiety by utilizing [employer].  Please download Unwinding Anxiety to lean how to resolve anxiety.   Advised to call [phone] to report any urgent concerns and/or schedule a sooner follow-up.      Provided with crisis/emergency resources, including Mobile Crisis [phone], Crisis Text Line (text 2KWNL to 501999) and the National Suicide Prevention Lifeline hotline 1-185.892.3183. Agrees to call 911 or go to the nearest emergency department if s/he feels unsafe, or has suicidal thinking with a plan or intent.      Next appointment: 6 weeks

## 2025-05-30 ENCOUNTER — APPOINTMENT (OUTPATIENT)
Dept: PAIN MEDICINE | Facility: CLINIC | Age: 40
End: 2025-05-30
Payer: COMMERCIAL

## 2025-06-03 RX ORDER — BUPROPION HYDROCHLORIDE 300 MG/1
300 TABLET ORAL DAILY
Qty: 90 TABLET | Refills: 0 | Status: SHIPPED | OUTPATIENT
Start: 2025-06-03 | End: 2026-06-03

## 2025-06-06 ENCOUNTER — APPOINTMENT (OUTPATIENT)
Dept: NUTRITION | Facility: CLINIC | Age: 40
End: 2025-06-06
Payer: COMMERCIAL

## 2025-06-08 DIAGNOSIS — R79.89 HIGH SERUM THYROID STIMULATING HORMONE (TSH): ICD-10-CM

## 2025-06-09 ENCOUNTER — APPOINTMENT (OUTPATIENT)
Dept: PHYSICAL THERAPY | Facility: CLINIC | Age: 40
End: 2025-06-09
Payer: COMMERCIAL

## 2025-06-09 ENCOUNTER — OFFICE VISIT (OUTPATIENT)
Dept: PRIMARY CARE | Facility: CLINIC | Age: 40
End: 2025-06-09
Payer: COMMERCIAL

## 2025-06-09 VITALS
HEART RATE: 120 BPM | WEIGHT: 227.8 LBS | DIASTOLIC BLOOD PRESSURE: 87 MMHG | SYSTOLIC BLOOD PRESSURE: 130 MMHG | BODY MASS INDEX: 44.72 KG/M2 | HEIGHT: 60 IN | TEMPERATURE: 98 F

## 2025-06-09 DIAGNOSIS — H60.501 ACUTE OTITIS EXTERNA OF RIGHT EAR, UNSPECIFIED TYPE: Primary | ICD-10-CM

## 2025-06-09 PROCEDURE — 99213 OFFICE O/P EST LOW 20 MIN: CPT | Performed by: FAMILY MEDICINE

## 2025-06-09 PROCEDURE — 1036F TOBACCO NON-USER: CPT | Performed by: FAMILY MEDICINE

## 2025-06-09 PROCEDURE — 3008F BODY MASS INDEX DOCD: CPT | Performed by: FAMILY MEDICINE

## 2025-06-09 RX ORDER — CIPROFLOXACIN 0.5 MG/.25ML
0.25 SOLUTION/ DROPS AURICULAR (OTIC) 2 TIMES DAILY
Qty: 14 EACH | Refills: 0 | Status: SHIPPED | OUTPATIENT
Start: 2025-06-09 | End: 2025-06-16

## 2025-06-09 RX ORDER — AMOXICILLIN AND CLAVULANATE POTASSIUM 875; 125 MG/1; MG/1
875 TABLET, FILM COATED ORAL 2 TIMES DAILY
Qty: 20 TABLET | Refills: 0 | Status: SHIPPED | OUTPATIENT
Start: 2025-06-09 | End: 2025-06-19

## 2025-06-09 ASSESSMENT — PATIENT HEALTH QUESTIONNAIRE - PHQ9
2. FEELING DOWN, DEPRESSED OR HOPELESS: NOT AT ALL
SUM OF ALL RESPONSES TO PHQ9 QUESTIONS 1 AND 2: 0
1. LITTLE INTEREST OR PLEASURE IN DOING THINGS: NOT AT ALL

## 2025-06-09 NOTE — PROGRESS NOTES
Subjective   Patient ID: Jean-Claude Ronquillo is a 39 y.o. female who presents for Earache (Right ear pain that started hurting last week.  She tried olive oil with no relief.  Started Swelling Saturday night with a large lump underneath the ear.).    HPI   39-year-old female presents complaining of right ear pain worsening over the past week  Ear is tender to the touch and now has a small lymph node under the ear that she noticed on Saturday.  Denies any drainage from the ear.  Does have muffled hearing  Tried olive oil without any relief.  Does use Q-tips.  Recent trip to [place].  She drove  Was swimming there.  Also had a recent URI with some congestion for a few days.  That has resolved.  Denies any fevers or chills.  No history of ear infections    Is allergic to prednisone    Review of Systems  ROS as stated in HPI  Objective   /87   Pulse (!) 120   Temp 36.7 °C (98 °F)   Ht 1.524 m (5')   Wt 103 kg (227 lb 12.8 oz)   BMI 44.49 kg/m²     Physical Exam  Constitutional: A&O; NAD  HEENT: conjunctiva normal. EOMI; PERRLA; lids normal; right ear canal with tenderness palpation as well as erythema and mild edema.  Right TM with some erythema.  Left ear canal and TM clear   Neck: supple; No lymphadenopathy   Heart: RRR     Lungs: CTA bilateral      Assessment/Plan   Problem List Items Addressed This Visit    None  Visit Diagnoses         Codes      Acute otitis externa of right ear, unspecified type    -  Primary H60.501    Relevant Medications    ciprofloxacin (Cetraxal) 0.2 % otic solution    amoxicillin-clavulanate (Augmentin) 875-125 mg tablet        rx augmentin  rx cipro otic (pt reports reaction to prednisone so will avoid any cortisone drops)  Follow-up if persist or worsens

## 2025-06-10 DIAGNOSIS — M79.7 FIBROMYALGIA: ICD-10-CM

## 2025-06-10 RX ORDER — DULOXETIN HYDROCHLORIDE 30 MG/1
30 CAPSULE, DELAYED RELEASE ORAL DAILY
Qty: 7 CAPSULE | Refills: 0 | OUTPATIENT
Start: 2025-06-10 | End: 2025-06-17

## 2025-06-13 ENCOUNTER — TELEPHONE (OUTPATIENT)
Dept: PAIN MEDICINE | Facility: CLINIC | Age: 40
End: 2025-06-13
Payer: COMMERCIAL

## 2025-06-13 DIAGNOSIS — M79.7 FIBROMYALGIA: ICD-10-CM

## 2025-06-13 DIAGNOSIS — M47.817 LUMBOSACRAL SPONDYLOSIS WITHOUT MYELOPATHY: ICD-10-CM

## 2025-06-13 RX ORDER — CELECOXIB 200 MG/1
200 CAPSULE ORAL DAILY
Qty: 30 CAPSULE | Refills: 0 | Status: SHIPPED | OUTPATIENT
Start: 2025-06-13

## 2025-06-16 ENCOUNTER — OFFICE VISIT (OUTPATIENT)
Dept: NEUROLOGY | Facility: CLINIC | Age: 40
End: 2025-06-16
Payer: COMMERCIAL

## 2025-06-16 DIAGNOSIS — G43.009 MIGRAINE WITHOUT AURA, NOT INTRACTABLE, WITHOUT STATUS MIGRAINOSUS: Primary | ICD-10-CM

## 2025-06-16 DIAGNOSIS — G43.109 MIGRAINE WITH AURA, NOT INTRACTABLE, WITHOUT STATUS MIGRAINOSUS: ICD-10-CM

## 2025-06-16 PROCEDURE — 99214 OFFICE O/P EST MOD 30 MIN: CPT | Performed by: STUDENT IN AN ORGANIZED HEALTH CARE EDUCATION/TRAINING PROGRAM

## 2025-06-16 RX ORDER — RIZATRIPTAN BENZOATE 10 MG/1
10 TABLET ORAL ONCE AS NEEDED
Qty: 9 TABLET | Refills: 6 | Status: SHIPPED | OUTPATIENT
Start: 2025-06-16 | End: 2025-07-16

## 2025-06-16 RX ORDER — ATOGEPANT 30 MG/1
30 TABLET ORAL DAILY
Qty: 30 TABLET | Refills: 6 | Status: SHIPPED | OUTPATIENT
Start: 2025-06-16

## 2025-06-16 ASSESSMENT — ENCOUNTER SYMPTOMS
DEPRESSION: 0
LOSS OF SENSATION IN FEET: 0
OCCASIONAL FEELINGS OF UNSTEADINESS: 0

## 2025-06-16 NOTE — PROGRESS NOTES
Subjective     Chief Complaint: Headache    Jean-Claude Ronquillo is a 39 y.o. year old female who presents with chief complaint of headaches.    Jean-Claude started getting headaches around age 18, after birth of child. Was initially having 2/30 HA days per month.  Headaches gradually worsening in frequency and severity. Currently having 12/30 HA days per month. The headaches are usually throbbing and are located frontal, generally unilateral. The patient rates her most severe headaches a 10 in intensity. Generally, headaches last about 72 hours in duration. Associated nausea, photophobia, and phonophobia. Headaches are worsened with exertion. Triggers include stress.    Aura of stars and scotoma lasting 5-15 minutes coming on right at onset of headache. Occurs with >50% of headaches.     Has known sleep apnea. Working on getting inspire device currently.     Current Acute Headache Treatment Sumatriptan (not effective, no side effects)  Excedrin (works better than sumatriptan, resolves 50% of headaches)   Current Preventative Headache Treatment None   Previous Acute Headache Treatment  None   Previous Preventative Headache Treatment Topiramate (SE: rash and cognitive fog, hair loss)  Amitriptyline (SE: grogginess)       ROS: As per HPI, otherwise all other systems have been reviewed are negative for complaint.     Medical History[1]  Surgical History[2]  Family History[3]  Social History     Tobacco Use    Smoking status: Former     Current packs/day: 0.00     Average packs/day: 0.5 packs/day for 15.0 years (7.5 ttl pk-yrs)     Types: Cigarettes     Quit date: 2015     Years since quitting: 10.4     Passive exposure: Past    Smokeless tobacco: Never   Substance Use Topics    Alcohol use: Never        Objective   There were no vitals taken for this visit.    Neuro Exam:  Cardiac Exam: No apparent edema of b/l lower extremities  Neurological Exam:  MENTAL STATUS:   General Appearance: No distress, alert, interactive, and  cooperative. Orientation was normal to time, place and person. Recent and remote memory was intact.     OPHTHALMOSCOPIC:   The ophthalmoscopic exam was normal. The fundi were well visualized with normal disc margins, clear vessels and vascular pulsations. No disc edema. The cup/disk ratio was not enlarged. No hemorrhages or exudates were present in the posterior segments that were visualized.     CRANIAL NERVES:   CN 2         Visual fields full to confrontation.   CN 3, 4, 6   Pupils round, 4 mm in diameter, equally reactive to light. Lids symmetric; no ptosis. EOMs normal alignment, full range with normal saccades, pursuit and convergence.   No nystagmus.   CN 5   Facial sensation intact bilaterally.   CN 7   Normal and symmetric facial strength. Nasolabial folds symmetric.   CN 8   Hearing intact to conversation and finger rub.  CN 9, 10   Palate elevates symmetrically.  CN 11   Normal strength of shoulder shrug and neck turning.   CN 12   Tongue midline, with normal bulk and strength; no fasciculations.     MOTOR:   Muscle bulk and tone were normal in both upper and lower extremities.   No pronator drift bilaterally.  No fasciculations, tremor or other abnormal movements evident with the patient examined clothed.    STRENGTH:  R  L  Deltoid            5          5  Biceps  5 5  Triceps  5 5    Hip flexion 5 5  Knee Flex 5 5  Knee Ex 5 5    REFLEXES: R L  Biceps  2 2                     Triceps  2 2  Patellar  2 2     SENSORY:   In both upper and lower extremities, sensation was intact to light touch.    COORDINATION:   In both upper extremities, finger-nose-finger was intact without dysmetria or overshoot.     GAIT:   Station was stable with a normal base. Gait was stable with a normal arm swing and speed. No ataxia, shuffling, steppage or waddling was present. No circumduction was present. No Romberg sign was present.    Assessment/Plan   Given the description and frequency of headaches, patient likely has  episodic migraine wwo aura. Has been on topiramate and amitriptyline, but not tolerated. Per our discussion, will start atogepant for migraine ppx. For breakthrough headaches, will trial rizatriptan.    - start atogepant 30mg daily  - start rizatriptan 10mg PRN  - follow up in 4-5 months           [1]   Past Medical History:  Diagnosis Date    ADHD (attention deficit hyperactivity disorder)     Allergic     Anxiety     Arthritis     Chronic pain disorder     CPAP (continuous positive airway pressure) dependence     Depression     Disease of thyroid gland     Fibromyalgia, primary 2009    Fractures     GERD (gastroesophageal reflux disease)     Gestational diabetes mellitus in pregnancy, unspecified control     Gestational diabetes mellitus (GDM) in third trimester, gestational diabetes method of control unspecified    Hiatal hernia     Hypothyroidism     Joint pain     Liver disease 2024    Low back pain     Low grade squamous intraepithelial lesion on cytologic smear of cervix (LGSIL)     LGSIL of cervix of undetermined significance    Lumbar disc disease 2009    Lumbosacral disc disease     Methicillin resistant Staphylococcus aureus infection 2008    Migraines     Neuromuscular disorder (Multi)     Obesity     Other conditions influencing health status     Normal Pap smear    Sleep apnea     Sleep difficulties    [2]   Past Surgical History:  Procedure Laterality Date     SECTION, LOW TRANSVERSE  -18    CHOLECYSTECTOMY  2024    OTHER SURGICAL HISTORY  2021    [place] tooth extraction    OTHER SURGICAL HISTORY  2021     section    OTHER SURGICAL HISTORY  2021    Endoscopy    OTHER SURGICAL HISTORY  2021    Carpal tunnel surgery    WISDOM TOOTH EXTRACTION     [3]   Family History  Problem Relation Name Age of Onset    Anxiety disorder Mother Jess silverio     Alcohol abuse Mother Jess silverio     Other (degenerative disc disease) Mother Jess  jean claude     Hypertension Mother Jess silverio     Other (Lyme disease) Mother Jess silverio     Depression Mother Jess silverio     Arthritis Mother Jess silverio     COPD Mother Jess silverio     Depression Father Ciaran king     Bipolar disorder Father Ciaran king     Arthritis Father Ciaran king     Drug abuse Father Ciaran king     Hearing loss Father Ciaran king     ADD / ADHD Sister      Stroke Brother Shawn     Anxiety disorder Father's Sister B     Arthritis Maternal Grandfather Du     Cancer Maternal Grandmother Taniya     Anxiety disorder Paternal Grandmother Cuca     Diabetes Paternal Grandmother Cuca     Osteoporosis Paternal Grandmother Cuca     Arthritis Paternal Grandmother Cuca     Hearing loss Paternal Grandmother Cuca     Hypertension Paternal Grandmother Cuca     ADD / ADHD Daughter Jo     Depression Daughter Jo     ADD / ADHD Sister Linda     Neurofibromatosis Sister Allegra     Migraines Sister Allegra     ADD / ADHD Sister Allegra     ADD / ADHD Sister Linda     Migraines Sister Linda     Neurofibromatosis Sister Linda

## 2025-06-16 NOTE — PATIENT INSTRUCTIONS
Episodic migraine with and without aura    Starting qulipta 30mg daily for migraine prevention and rizatriptan for breakthrough headaches.    Follow up in 4-5 months  ___________________________________  You have been prescribed a medication called Rizatriptan at a dose of 10mg. This medication is to be taken as needed to stop a migraine. It is most effective if taken at onset of headache, with a goal of completely resolving a migraine within 2 hours. If you do not have complete resolution of headache within 2 hours, take a second tablet. Max daily dose is 3 tablets (30mg).    Please take this medication less than 10 times per month. Taken too often, there is a chance of developing medication overuse headache, which would result in a higher migraine frequency.     Common side effects include light-headed sensation, drowsiness, and restlessness. Another side effect is the sensation of chest/neck tightness that can last minutes to a few hours. You should not have any difficulty breathing or irregular heart rhythm with this side effect, and it self resolves.    Do not take this medication if you have uncontrolled blood pressure or a history of heart attack, as it can cause vasospasm.     If you do not tolerate this medication, please discontinue its use, and we can discuss alternative acute medications at your upcoming appointment.  _____________________________________  Atogepant is a daily pill that is meant to reduce headache frequency and severity by 50% or more. It could take up to 3 months to know if effective. Most common side effects are nausea and constipation. Please let the office know if this become a problem.

## 2025-06-17 ENCOUNTER — SPECIALTY PHARMACY (OUTPATIENT)
Dept: PHARMACY | Facility: CLINIC | Age: 40
End: 2025-06-17

## 2025-06-19 ENCOUNTER — SPECIALTY PHARMACY (OUTPATIENT)
Dept: PHARMACY | Facility: CLINIC | Age: 40
End: 2025-06-19

## 2025-06-20 ENCOUNTER — HOSPITAL ENCOUNTER (OUTPATIENT)
Dept: PAIN MEDICINE | Facility: CLINIC | Age: 40
Discharge: HOME | End: 2025-06-20
Payer: COMMERCIAL

## 2025-06-20 VITALS
RESPIRATION RATE: 18 BRPM | HEIGHT: 60 IN | HEART RATE: 104 BPM | SYSTOLIC BLOOD PRESSURE: 124 MMHG | OXYGEN SATURATION: 94 % | TEMPERATURE: 97 F | BODY MASS INDEX: 44.57 KG/M2 | WEIGHT: 227 LBS | DIASTOLIC BLOOD PRESSURE: 76 MMHG

## 2025-06-20 DIAGNOSIS — M47.817 LUMBOSACRAL SPONDYLOSIS WITHOUT MYELOPATHY: ICD-10-CM

## 2025-06-20 PROCEDURE — RXMED WILLOW AMBULATORY MEDICATION CHARGE

## 2025-06-20 PROCEDURE — 2550000001 HC RX 255 CONTRASTS: Performed by: ANESTHESIOLOGY

## 2025-06-20 PROCEDURE — 64493 INJ PARAVERT F JNT L/S 1 LEV: CPT | Mod: 50 | Performed by: ANESTHESIOLOGY

## 2025-06-20 PROCEDURE — 2500000004 HC RX 250 GENERAL PHARMACY W/ HCPCS (ALT 636 FOR OP/ED): Performed by: ANESTHESIOLOGY

## 2025-06-20 RX ORDER — METHYLPREDNISOLONE ACETATE 40 MG/ML
INJECTION, SUSPENSION INTRA-ARTICULAR; INTRALESIONAL; INTRAMUSCULAR; SOFT TISSUE AS NEEDED
Status: COMPLETED | OUTPATIENT
Start: 2025-06-20 | End: 2025-06-20

## 2025-06-20 RX ORDER — LIDOCAINE HYDROCHLORIDE 10 MG/ML
INJECTION, SOLUTION EPIDURAL; INFILTRATION; INTRACAUDAL; PERINEURAL AS NEEDED
Status: COMPLETED | OUTPATIENT
Start: 2025-06-20 | End: 2025-06-20

## 2025-06-20 RX ORDER — ROPIVACAINE HYDROCHLORIDE 5 MG/ML
INJECTION, SOLUTION EPIDURAL; INFILTRATION; PERINEURAL AS NEEDED
Status: COMPLETED | OUTPATIENT
Start: 2025-06-20 | End: 2025-06-20

## 2025-06-20 RX ADMIN — IOHEXOL 3 ML: 300 INJECTION, SOLUTION INTRAVENOUS at 10:45

## 2025-06-20 RX ADMIN — LIDOCAINE HYDROCHLORIDE 10 ML: 10 INJECTION, SOLUTION EPIDURAL; INFILTRATION; INTRACAUDAL; PERINEURAL at 10:44

## 2025-06-20 RX ADMIN — METHYLPREDNISOLONE ACETATE 40 MG: 40 INJECTION, SUSPENSION INTRA-ARTICULAR; INTRALESIONAL; INTRAMUSCULAR; INTRASYNOVIAL; SOFT TISSUE at 10:45

## 2025-06-20 RX ADMIN — ROPIVACAINE HYDROCHLORIDE 10 ML: 5 INJECTION, SOLUTION EPIDURAL; INFILTRATION; PERINEURAL at 10:44

## 2025-06-20 ASSESSMENT — ENCOUNTER SYMPTOMS
BACK PAIN: 1
FEVER: 0
SHORTNESS OF BREATH: 0

## 2025-06-20 ASSESSMENT — PAIN - FUNCTIONAL ASSESSMENT: PAIN_FUNCTIONAL_ASSESSMENT: 0-10

## 2025-06-20 ASSESSMENT — PAIN SCALES - GENERAL: PAINLEVEL_OUTOF10: 6

## 2025-06-20 NOTE — H&P
History Of Present Illness  Jean-Claude Ronquillo is a 39 y.o. female presenting for bilateral facet joint injection     Past Medical History  Medical History[1]    Surgical History  Surgical History[2]     Social History  She reports that she quit smoking about 10 years ago. Her smoking use included cigarettes. She has a 7.5 pack-year smoking history. She has been exposed to tobacco smoke. She has never used smokeless tobacco. She reports that she does not drink alcohol and does not use drugs.    Family History  Family History[3]     Allergies  Prednisone, Cat dander, and Topiramate    Review of Systems   Constitutional:  Negative for fever.   Respiratory:  Negative for shortness of breath.    Cardiovascular:  Negative for chest pain.   Musculoskeletal:  Positive for back pain.   Psychiatric/Behavioral:  Negative for suicidal ideas.         Physical Exam  Cardiovascular:      Rate and Rhythm: Normal rate.   Pulmonary:      Effort: Pulmonary effort is normal.   Neurological:      Mental Status: She is alert and oriented to person, place, and time.          Last Recorded Vitals  Blood pressure 124/76, pulse 104, temperature 36.1 °C (97 °F), resp. rate 18, height 1.524 m (5'), weight 103 kg (227 lb), SpO2 94%.      Assessment/Plan       Patient does not have any contraindications for the planned procedure.      Jason Meeks MD         [1]   Past Medical History:  Diagnosis Date    ADHD (attention deficit hyperactivity disorder)     Allergic     Anxiety     Arthritis     Chronic pain disorder     CPAP (continuous positive airway pressure) dependence 2023    Depression     Disease of thyroid gland     Fibromyalgia, primary 2009    Fractures 2019/2023    GERD (gastroesophageal reflux disease)     Gestational diabetes mellitus in pregnancy, unspecified control     Gestational diabetes mellitus (GDM) in third trimester, gestational diabetes method of control unspecified    Hiatal hernia     Hypothyroidism     Joint pain      Liver disease 2024    Low back pain     Low grade squamous intraepithelial lesion on cytologic smear of cervix (LGSIL)     LGSIL of cervix of undetermined significance    Lumbar disc disease 2009    Lumbosacral disc disease     Methicillin resistant Staphylococcus aureus infection 2008    Migraines     Neuromuscular disorder (Multi)     Obesity     Other conditions influencing health status     Normal Pap smear    Sleep apnea     Sleep difficulties    [2]   Past Surgical History:  Procedure Laterality Date     SECTION, LOW TRANSVERSE  6-22-18    CHOLECYSTECTOMY  2024    OTHER SURGICAL HISTORY  2021    [place] tooth extraction    OTHER SURGICAL HISTORY  2021     section    OTHER SURGICAL HISTORY  2021    Endoscopy    OTHER SURGICAL HISTORY  2021    Carpal tunnel surgery    WISDOM TOOTH EXTRACTION     [3]   Family History  Problem Relation Name Age of Onset    Anxiety disorder Mother Jess silverio     Alcohol abuse Mother Jess silverio     Other (degenerative disc disease) Mother Jess silverio     Hypertension Mother Jess silverio     Other (Lyme disease) Mother Jess silverio     Depression Mother Jess silverio     Arthritis Mother Jess silverio     COPD Mother Jess silverio     Migraines Mother Jess silverio     Depression Father Ciaran king     Bipolar disorder Father Ciaran king     Arthritis Father Ciaran king     Drug abuse Father Ciaran king     Hearing loss Father Ciaran king     ADD / ADHD Sister      ADD / ADHD Sister Linda     Neurofibromatosis Sister Allegra     Migraines Sister Allegra     ADD / ADHD Sister Allegra     ADD / ADHD Sister Linda     Migraines Sister Linda     Neurofibromatosis Sister Linda     Stroke Brother Shawn     Anxiety disorder Father's Sister B     Migraines Father's Sister B     Cancer Maternal Grandmother Taniya     Arthritis Maternal Grandfather Du     Anxiety disorder Paternal Grandmother Cuca      Diabetes Paternal Grandmother Cuca     Osteoporosis Paternal Grandmother Cuca     Arthritis Paternal Grandmother Cuca     Hearing loss Paternal Grandmother Cuca     Hypertension Paternal Grandmother Cuca     ADD / ADHD Daughter Jo     Depression Daughter Jo     Migraines Daughter Jo

## 2025-06-21 ENCOUNTER — PHARMACY VISIT (OUTPATIENT)
Dept: PHARMACY | Facility: CLINIC | Age: 40
End: 2025-06-21
Payer: MEDICARE

## 2025-06-24 ENCOUNTER — APPOINTMENT (OUTPATIENT)
Dept: BEHAVIORAL HEALTH | Facility: CLINIC | Age: 40
End: 2025-06-24
Payer: COMMERCIAL

## 2025-06-24 DIAGNOSIS — R41.840 CONCENTRATION DEFICIT: ICD-10-CM

## 2025-06-24 DIAGNOSIS — F33.0 MILD EPISODE OF RECURRENT MAJOR DEPRESSIVE DISORDER: ICD-10-CM

## 2025-06-24 PROCEDURE — 99213 OFFICE O/P EST LOW 20 MIN: CPT

## 2025-06-24 RX ORDER — BUPROPION HYDROCHLORIDE 150 MG/1
150 TABLET ORAL DAILY
Qty: 90 TABLET | Refills: 0 | Status: SHIPPED | OUTPATIENT
Start: 2025-06-24 | End: 2026-06-24

## 2025-06-24 NOTE — PROGRESS NOTES
Subjective   Patient ID: Jean-Claude Ronquillo is a 39 y.o. female who presents for  DAVID and MDD.     Virtual or Telephone Consent    An interactive audio and video telecommunication system which permits real time communications between the patient (at the originating site) and provider (at the distant site) was utilized to provide this telehealth service.   Verbal consent was requested and obtained from Jean-Claude Ronquillo on this date, 06/24/25 for a telehealth visit and the patient's location was confirmed at the time of the visit.     HPI  With the use of bupropion  mg her mood has improved by 35% but states motivation and concentration remains poor. 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 Her  travels often d/t employment as a  as a result she feels lonely while taking care of her elderly grandmother.   Denies self-harming thoughts, AVH, and SI/HI.     Past medications:  sertraline 100 mg -ineffective   Ativan.5 mg - effective no side effects  Amitriptyline 25 mg -very groggy the next day  Bupropion -does not remember the effectiveness  duloxetine 120 mg -no side effects has yet to notice a benefit  Trazodone 50 mg - headaches    Review of Systems   All other systems reviewed and are negative.        Objective   Physical Exam  Psychiatric:         Attention and Perception: Attention normal.         Mood and Affect: Mood normal.         Speech: Speech normal.         Behavior: Behavior is cooperative.         Thought Content: Thought content normal.         Cognition and Memory: Cognition normal.         Judgment: Judgment normal.         Acute risk of self-harm remains low despite current stressors (acute and chronic). No reported thoughts of self-harm plan, or intent. S/He is future-oriented, feels responsibility for her/his family, and has no hx of SA or hospitalizations.   Assessment/Plan   Discontinue venlafaxine  37.5 mg.  Continue  bupropion  mg to target mood and energy.    Continue duloxetine 60 mg as directed by your PCP.   Seek therapy to resolve depression and anxiety by utilizing [facility].  Please download Unwinding Anxiety to lean how to resolve anxiety.   Advised to call [phone] to report any urgent concerns and/or schedule a sooner follow-up.      Provided with crisis/emergency resources, including Mobile Crisis [phone], Crisis Text Line (text 0LLCC js 619115) and the National Suicide Prevention Lifeline hotline 1-896.824.4982. Agrees to call 911 or go to the nearest emergency department if s/he feels unsafe, or has suicidal thinking with a plan or intent.      Next appointment: 6 weeks

## 2025-06-26 ENCOUNTER — APPOINTMENT (OUTPATIENT)
Facility: CLINIC | Age: 40
End: 2025-06-26
Payer: COMMERCIAL

## 2025-07-16 ENCOUNTER — SPECIALTY PHARMACY (OUTPATIENT)
Dept: PHARMACY | Facility: CLINIC | Age: 40
End: 2025-07-16

## 2025-07-16 PROCEDURE — RXMED WILLOW AMBULATORY MEDICATION CHARGE

## 2025-07-17 ENCOUNTER — PHARMACY VISIT (OUTPATIENT)
Dept: PHARMACY | Facility: CLINIC | Age: 40
End: 2025-07-17
Payer: MEDICARE

## 2025-07-25 ENCOUNTER — APPOINTMENT (OUTPATIENT)
Dept: OTOLARYNGOLOGY | Facility: CLINIC | Age: 40
End: 2025-07-25
Payer: COMMERCIAL

## 2025-07-29 ENCOUNTER — APPOINTMENT (OUTPATIENT)
Dept: OBSTETRICS AND GYNECOLOGY | Facility: CLINIC | Age: 40
End: 2025-07-29
Payer: COMMERCIAL

## 2025-07-31 ENCOUNTER — APPOINTMENT (OUTPATIENT)
Dept: BEHAVIORAL HEALTH | Facility: CLINIC | Age: 40
End: 2025-07-31
Payer: COMMERCIAL

## 2025-07-31 DIAGNOSIS — F33.0 MILD EPISODE OF RECURRENT MAJOR DEPRESSIVE DISORDER: ICD-10-CM

## 2025-07-31 DIAGNOSIS — R41.840 CONCENTRATION DEFICIT: ICD-10-CM

## 2025-07-31 PROCEDURE — 99213 OFFICE O/P EST LOW 20 MIN: CPT

## 2025-07-31 RX ORDER — BUPROPION HYDROCHLORIDE 150 MG/1
150 TABLET ORAL DAILY
Qty: 90 TABLET | Refills: 0 | Status: SHIPPED | OUTPATIENT
Start: 2025-07-31 | End: 2026-07-31

## 2025-07-31 RX ORDER — BUPROPION HYDROCHLORIDE 300 MG/1
300 TABLET ORAL DAILY
Qty: 90 TABLET | Refills: 0 | Status: SHIPPED | OUTPATIENT
Start: 2025-07-31 | End: 2026-07-31

## 2025-07-31 NOTE — PROGRESS NOTES
Subjective   Patient ID: Jean-Claude Ronquillo is a 40 y.o. female who presents for DAVID and MDD.     Virtual or Telephone Consent    An interactive audio and video telecommunication system which permits real time communications between the patient (at the originating site) and provider (at the distant site) was utilized to provide this telehealth service.   Verbal consent was requested and obtained from Jean-Claude Ronquillo on this date, 07/31/25 for a telehealth visit and the patient's location was confirmed at the time of the visit.     HPI  With the use of bupropion  mg her mood has been stable, anxiety is manageable, but concentration remains poor and her energy depletes around 7 or 8:00 pm.   Denies self-harming thoughts, AVH, and SI/HI.      Past medications:  sertraline 100 mg -ineffective   Ativan.5 mg - effective no side effects  Amitriptyline 25 mg -very groggy the next day  Bupropion -does not remember the effectiveness  duloxetine 120 mg -no side effects has yet to notice a benefit  Trazodone 50 mg - headaches    Review of Systems   All other systems reviewed and are negative.        Objective   Physical Exam    Psychiatric:         Attention and Perception: Attention normal.         Mood and Affect: Mood normal.         Speech: Speech normal.         Behavior: Behavior is cooperative.         Thought Content: Thought content normal.         Cognition and Memory: Cognition normal.         Judgment: Judgment normal.       Acute risk of self-harm remains low despite current stressors (acute and chronic). No reported thoughts of self-harm plan, or intent. S/He is future-oriented, feels responsibility for her/his family, and has no hx of SA or hospitalizations.       Assessment/Plan   Contact [facility] to confirm the dx of ADHD.   Continue  bupropion  mg to target mood and energy.    Continue duloxetine 60 mg as directed by your PCP.   Seek therapy to resolve depression and anxiety by  utilizing [facility].  Please download Unwinding Anxiety to lean how to resolve anxiety.   Advised to call [phone] to report any urgent concerns and/or schedule a sooner follow-up.      Provided with crisis/emergency resources, including Mobile Crisis [phone], Crisis Text Line (text 4[facility] tv [id number]) and the National Suicide Prevention Lifeline hotline 1-568.579.4607. Agrees to call 911 or go to the nearest emergency department if s/he feels unsafe, or has suicidal thinking with a plan or intent.      Next appointment: 6 weeks

## 2025-08-01 ENCOUNTER — APPOINTMENT (OUTPATIENT)
Dept: PAIN MEDICINE | Facility: CLINIC | Age: 40
End: 2025-08-01
Payer: COMMERCIAL

## 2025-08-04 ENCOUNTER — APPOINTMENT (OUTPATIENT)
Dept: PAIN MEDICINE | Facility: CLINIC | Age: 40
End: 2025-08-04
Payer: COMMERCIAL

## 2025-08-06 DIAGNOSIS — M79.7 FIBROMYALGIA: ICD-10-CM

## 2025-08-06 RX ORDER — DULOXETIN HYDROCHLORIDE 60 MG/1
CAPSULE, DELAYED RELEASE ORAL
Qty: 90 CAPSULE | Refills: 2 | Status: SHIPPED | OUTPATIENT
Start: 2025-08-06

## 2025-08-08 ENCOUNTER — APPOINTMENT (OUTPATIENT)
Dept: NEUROLOGY | Facility: CLINIC | Age: 40
End: 2025-08-08
Payer: COMMERCIAL

## 2025-08-08 ENCOUNTER — APPOINTMENT (OUTPATIENT)
Dept: PAIN MEDICINE | Facility: CLINIC | Age: 40
End: 2025-08-08
Payer: COMMERCIAL

## 2025-08-11 ENCOUNTER — SPECIALTY PHARMACY (OUTPATIENT)
Dept: PHARMACY | Facility: CLINIC | Age: 40
End: 2025-08-11

## 2025-08-15 PROCEDURE — RXMED WILLOW AMBULATORY MEDICATION CHARGE

## 2025-08-18 ENCOUNTER — PHARMACY VISIT (OUTPATIENT)
Dept: PHARMACY | Facility: CLINIC | Age: 40
End: 2025-08-18
Payer: MEDICARE

## 2025-08-19 ENCOUNTER — APPOINTMENT (OUTPATIENT)
Dept: PAIN MEDICINE | Facility: CLINIC | Age: 40
End: 2025-08-19
Payer: COMMERCIAL

## 2025-08-19 ENCOUNTER — APPOINTMENT (OUTPATIENT)
Dept: PRIMARY CARE | Facility: CLINIC | Age: 40
End: 2025-08-19
Payer: COMMERCIAL

## 2025-08-23 DIAGNOSIS — E55.9 VITAMIN D DEFICIENCY: ICD-10-CM

## 2025-08-25 RX ORDER — ERGOCALCIFEROL 1.25 MG/1
1.25 CAPSULE ORAL 2 TIMES WEEKLY
Qty: 24 CAPSULE | Refills: 4 | Status: SHIPPED | OUTPATIENT
Start: 2025-08-25

## 2025-09-01 PROBLEM — R63.5 ABNORMAL WEIGHT GAIN: Status: RESOLVED | Noted: 2025-03-19 | Resolved: 2025-09-01

## 2025-09-01 PROBLEM — D62 POSTOPERATIVE ANEMIA DUE TO ACUTE BLOOD LOSS: Status: RESOLVED | Noted: 2025-03-19 | Resolved: 2025-09-01

## 2025-09-01 PROBLEM — F33.0 MILD EPISODE OF RECURRENT MAJOR DEPRESSIVE DISORDER: Status: RESOLVED | Noted: 2023-03-07 | Resolved: 2025-09-01

## 2025-09-01 PROBLEM — D72.829 LEUKOCYTOSIS: Status: RESOLVED | Noted: 2025-03-19 | Resolved: 2025-09-01

## 2025-09-01 PROBLEM — M54.9 BACK PAIN, CHRONIC: Status: RESOLVED | Noted: 2023-03-07 | Resolved: 2025-09-01

## 2025-09-01 PROBLEM — G89.29 BACK PAIN, CHRONIC: Status: RESOLVED | Noted: 2023-03-07 | Resolved: 2025-09-01

## 2025-09-01 ASSESSMENT — ENCOUNTER SYMPTOMS
EYE REDNESS: 0
CONFUSION: 0
BACK PAIN: 0
ABDOMINAL PAIN: 0
SORE THROAT: 0
TROUBLE SWALLOWING: 0
EYE DISCHARGE: 0
APPETITE CHANGE: 0
POLYDIPSIA: 0
BLOOD IN STOOL: 0
WOUND: 0
FREQUENCY: 0
FATIGUE: 0
SHORTNESS OF BREATH: 0
POLYPHAGIA: 0
HALLUCINATIONS: 0
CHILLS: 0
UNEXPECTED WEIGHT CHANGE: 0
FEVER: 0
NECK PAIN: 0
PALPITATIONS: 0
HEMATURIA: 0
ADENOPATHY: 0
EYE PAIN: 0
DIZZINESS: 0
HEADACHES: 0
VOMITING: 0
COUGH: 0
DYSURIA: 0
BRUISES/BLEEDS EASILY: 0

## 2025-09-02 ENCOUNTER — APPOINTMENT (OUTPATIENT)
Dept: PRIMARY CARE | Facility: CLINIC | Age: 40
End: 2025-09-02
Payer: COMMERCIAL

## 2025-09-02 PROBLEM — R79.89 LOW SERUM ADRENOCORTICOTROPHIC HORMONE (ACTH): Status: ACTIVE | Noted: 2025-09-02

## 2025-09-02 ASSESSMENT — PATIENT HEALTH QUESTIONNAIRE - PHQ9
7. TROUBLE CONCENTRATING ON THINGS, SUCH AS READING THE NEWSPAPER OR WATCHING TELEVISION: NEARLY EVERY DAY
4. FEELING TIRED OR HAVING LITTLE ENERGY: NEARLY EVERY DAY
SUM OF ALL RESPONSES TO PHQ9 QUESTIONS 1 AND 2: 4
1. LITTLE INTEREST OR PLEASURE IN DOING THINGS: MORE THAN HALF THE DAYS
8. MOVING OR SPEAKING SO SLOWLY THAT OTHER PEOPLE COULD HAVE NOTICED. OR THE OPPOSITE, BEING SO FIGETY OR RESTLESS THAT YOU HAVE BEEN MOVING AROUND A LOT MORE THAN USUAL: NOT AT ALL
6. FEELING BAD ABOUT YOURSELF - OR THAT YOU ARE A FAILURE OR HAVE LET YOURSELF OR YOUR FAMILY DOWN: NEARLY EVERY DAY
2. FEELING DOWN, DEPRESSED OR HOPELESS: MORE THAN HALF THE DAYS
3. TROUBLE FALLING OR STAYING ASLEEP OR SLEEPING TOO MUCH: NEARLY EVERY DAY
5. POOR APPETITE OR OVEREATING: NEARLY EVERY DAY
9. THOUGHTS THAT YOU WOULD BE BETTER OFF DEAD, OR OF HURTING YOURSELF: NOT AT ALL
SUM OF ALL RESPONSES TO PHQ QUESTIONS 1-9: 19

## 2025-09-02 ASSESSMENT — ANXIETY QUESTIONNAIRES
4. TROUBLE RELAXING: NEARLY EVERY DAY
6. BECOMING EASILY ANNOYED OR IRRITABLE: MORE THAN HALF THE DAYS
5. BEING SO RESTLESS THAT IT IS HARD TO SIT STILL: MORE THAN HALF THE DAYS
GAD7 TOTAL SCORE: 15
1. FEELING NERVOUS, ANXIOUS, OR ON EDGE: NEARLY EVERY DAY
2. NOT BEING ABLE TO STOP OR CONTROL WORRYING: MORE THAN HALF THE DAYS
IF YOU CHECKED OFF ANY PROBLEMS ON THIS QUESTIONNAIRE, HOW DIFFICULT HAVE THESE PROBLEMS MADE IT FOR YOU TO DO YOUR WORK, TAKE CARE OF THINGS AT HOME, OR GET ALONG WITH OTHER PEOPLE: VERY DIFFICULT
7. FEELING AFRAID AS IF SOMETHING AWFUL MIGHT HAPPEN: SEVERAL DAYS
3. WORRYING TOO MUCH ABOUT DIFFERENT THINGS: MORE THAN HALF THE DAYS

## 2025-09-03 ENCOUNTER — APPOINTMENT (OUTPATIENT)
Dept: PRIMARY CARE | Facility: CLINIC | Age: 40
End: 2025-09-03
Payer: COMMERCIAL

## 2025-09-08 ENCOUNTER — APPOINTMENT (OUTPATIENT)
Dept: PAIN MEDICINE | Facility: CLINIC | Age: 40
End: 2025-09-08
Payer: COMMERCIAL

## 2025-09-10 ENCOUNTER — APPOINTMENT (OUTPATIENT)
Dept: PAIN MEDICINE | Facility: CLINIC | Age: 40
End: 2025-09-10
Payer: COMMERCIAL

## 2025-09-15 ENCOUNTER — APPOINTMENT (OUTPATIENT)
Dept: PAIN MEDICINE | Facility: CLINIC | Age: 40
End: 2025-09-15
Payer: COMMERCIAL

## 2025-09-16 ENCOUNTER — APPOINTMENT (OUTPATIENT)
Dept: BEHAVIORAL HEALTH | Facility: CLINIC | Age: 40
End: 2025-09-16
Payer: COMMERCIAL

## 2025-09-17 ENCOUNTER — APPOINTMENT (OUTPATIENT)
Dept: BEHAVIORAL HEALTH | Facility: CLINIC | Age: 40
End: 2025-09-17
Payer: COMMERCIAL

## 2025-10-03 ENCOUNTER — APPOINTMENT (OUTPATIENT)
Facility: CLINIC | Age: 40
End: 2025-10-03
Payer: COMMERCIAL

## 2025-12-05 ENCOUNTER — APPOINTMENT (OUTPATIENT)
Dept: PRIMARY CARE | Facility: CLINIC | Age: 40
End: 2025-12-05
Payer: COMMERCIAL

## 2026-03-30 ENCOUNTER — APPOINTMENT (OUTPATIENT)
Dept: ENDOCRINOLOGY | Facility: CLINIC | Age: 41
End: 2026-03-30
Payer: COMMERCIAL

## (undated) DEVICE — Device

## (undated) DEVICE — TROCAR SYSTEM, BALLOON, KII GELPORT, 12 X 100MM

## (undated) DEVICE — APPLICATOR, ENDOSCOPIC, F/SURGICEL POWDER

## (undated) DEVICE — EXTENSION SET, IV, MACROBORE, 30IN, NON DEHP

## (undated) DEVICE — DRESSING TRNSPAR W2XL2.75IN FLM SHT SEMIPERMEABLE WIND

## (undated) DEVICE — STRAP,POSITIONING,KNEE/BODY,FOAM,4X60": Brand: MEDLINE

## (undated) DEVICE — GAUZE,SPONGE,FLUFF,6"X6.75",STRL,5/TRAY: Brand: MEDLINE

## (undated) DEVICE — KENDALL SCD EXPRESS SLEEVES, KNEE LENGTH, MEDIUM: Brand: KENDALL SCD

## (undated) DEVICE — CORD,CAUTERY,BIPOLAR,STERILE: Brand: MEDLINE

## (undated) DEVICE — TROCAR, KII OPTICAL BLADELESS 5MM Z THREAD 100MM LNGTH

## (undated) DEVICE — GLOVE, SURGICAL, PROTEXIS PI , 7.0, PF, LF

## (undated) DEVICE — DRAPE, C-ARM IMAGE

## (undated) DEVICE — SYRINGE, LUER LOCK, 10ML: Brand: MEDLINE

## (undated) DEVICE — SUTURE CHROMIC GUT 0 L36IN CT-1 L36MM 1/2 CIR TAPERPOINT 924H

## (undated) DEVICE — SUTURE VCRL SZ 4-0 L27IN ABSRB UD L24MM FS-1 3/8 CIR REV J441H

## (undated) DEVICE — SINGLE PORT MANIFOLD: Brand: NEPTUNE 2

## (undated) DEVICE — Z DUP USE 2522782 SOLUTION IRRIG 1000ML STRL H2O PLAS CONTAINER UROMATIC

## (undated) DEVICE — GOWN, SURGICAL, SIRUS, NON REINFORCED, LARGE

## (undated) DEVICE — NEEDLE, HYPODERMIC, MONOJECT, 25 G X 1.5 IN, LUER LOCK HUB, RED

## (undated) DEVICE — SYRINGE, 10 CC, LUER LOCK

## (undated) DEVICE — ELECTRODE, EZ CLEAN LAPAROSCOPIC L-WIRE, 33CM, DISP

## (undated) DEVICE — RETRIEVAL SYSTEM, MONARCH, 10MM DISP ENDOSCOPIC

## (undated) DEVICE — SUTURE VCRL SZ 3-0 L27IN ABSRB UD L26MM SH 1/2 CIR J416H

## (undated) DEVICE — WOUND RETRACTOR AND PROTECTOR: Brand: ALEXIS WOUND PROTECTOR-RETRACTOR

## (undated) DEVICE — ADHESIVE, SKIN, DERMABOND ADVANCED, 15CM, PEN-STYLE

## (undated) DEVICE — DRAPE, SHEET, LARGE, 70 X 85IN, STERILE

## (undated) DEVICE — Z INACTIVE USE 2653177 SPONGE GZ W2XL2IN NONWOVEN 4 PLY FASTER WICKING ABIL AVANT

## (undated) DEVICE — PAD, GROUNDING, ELECTROSURGICAL, W/9 FT CABLE, POLYHESIVE II, ADULT, LF

## (undated) DEVICE — GOWN,SIRUS,NONRNF,SETINSLV,XL,20/CS: Brand: MEDLINE

## (undated) DEVICE — CATHETER, IV, ANGIOCATH, 14 G X 5.25 IN, FEP POLYMER

## (undated) DEVICE — SYRINGE, 50 CC, LUER LOCK

## (undated) DEVICE — DRAPE, TIBURON W/ADHESIVE, 19 X 30

## (undated) DEVICE — CLIP, ENDO APPLIER LIGAMAX 5MM

## (undated) DEVICE — TUBE SET, PNEUMOCLEAR, SMOKE EVACU, HIGH-FLOW

## (undated) DEVICE — SUTURE, VICRYL PLUS, 0, 27IN, UR-6, VIOLET, BRAIDED

## (undated) DEVICE — GLOVE ORANGE PI 7 1/2   MSG9075

## (undated) DEVICE — ELECTRODE, LAPAROSCOPY, L HOOK, 33CM, STERILE

## (undated) DEVICE — SUTURE, MONOCRYL, 4-0, 27 IN, PS-2, UNDYED

## (undated) DEVICE — BANDAGE GZ W2XL75IN ST RAYON POLY CNFRM STRTCH LTWT

## (undated) DEVICE — ZIMMER® STERILE DISPOSABLE TOURNIQUET CUFF WITH PLC, DUAL PORT, SINGLE BLADDER, 18 IN. (46 CM)

## (undated) DEVICE — 1010 S-DRAPE TOWEL DRAPE 10/BX: Brand: STERI-DRAPE™

## (undated) DEVICE — DRAPE,U/ SHT,SPLIT,PLAS,STERIL: Brand: MEDLINE

## (undated) DEVICE — TOWEL SURG W16XL26IN WHT NONFENESTRATED ST 2 PER PK

## (undated) DEVICE — CARE KIT, LAPAROSCOPIC, ADVANCED

## (undated) DEVICE — IRRIGATOR, WOUND, HYDRO SURG PLUS, W/O TIP, DISP

## (undated) DEVICE — SOLUTION, INJECTION, USP, LACTATED RINGERS, LIFECARE, 1000ML

## (undated) DEVICE — HEMOSTAT, SURGICEL POWDER 3.0GRAMS

## (undated) DEVICE — GLOVE ORANGE PI 7   MSG9070

## (undated) DEVICE — SLEEVE, KII, Z-THREAD, 5X100CM